# Patient Record
Sex: MALE | Race: WHITE | NOT HISPANIC OR LATINO | URBAN - METROPOLITAN AREA
[De-identification: names, ages, dates, MRNs, and addresses within clinical notes are randomized per-mention and may not be internally consistent; named-entity substitution may affect disease eponyms.]

---

## 2019-11-04 ENCOUNTER — ON CAMPUS - OUTPATIENT (AMBULATORY)
Dept: URBAN - METROPOLITAN AREA HOSPITAL 2 | Facility: HOSPITAL | Age: 74
End: 2019-11-04

## 2019-11-04 ENCOUNTER — OFFICE (AMBULATORY)
Dept: URBAN - METROPOLITAN AREA PATHOLOGY 4 | Facility: PATHOLOGY | Age: 74
End: 2019-11-04

## 2019-11-04 VITALS
SYSTOLIC BLOOD PRESSURE: 116 MMHG | DIASTOLIC BLOOD PRESSURE: 65 MMHG | SYSTOLIC BLOOD PRESSURE: 111 MMHG | TEMPERATURE: 97.9 F | HEART RATE: 84 BPM | HEART RATE: 75 BPM | OXYGEN SATURATION: 93 % | RESPIRATION RATE: 18 BRPM | WEIGHT: 170.4 LBS | HEART RATE: 82 BPM | HEIGHT: 68 IN | DIASTOLIC BLOOD PRESSURE: 70 MMHG | SYSTOLIC BLOOD PRESSURE: 143 MMHG | OXYGEN SATURATION: 100 % | HEART RATE: 79 BPM | SYSTOLIC BLOOD PRESSURE: 147 MMHG | SYSTOLIC BLOOD PRESSURE: 132 MMHG | SYSTOLIC BLOOD PRESSURE: 121 MMHG | DIASTOLIC BLOOD PRESSURE: 66 MMHG | RESPIRATION RATE: 16 BRPM | OXYGEN SATURATION: 95 % | DIASTOLIC BLOOD PRESSURE: 71 MMHG | DIASTOLIC BLOOD PRESSURE: 61 MMHG | OXYGEN SATURATION: 97 %

## 2019-11-04 DIAGNOSIS — K22.70 BARRETT'S ESOPHAGUS WITHOUT DYSPLASIA: ICD-10-CM

## 2019-11-04 DIAGNOSIS — K31.89 OTHER DISEASES OF STOMACH AND DUODENUM: ICD-10-CM

## 2019-11-04 DIAGNOSIS — K44.9 DIAPHRAGMATIC HERNIA WITHOUT OBSTRUCTION OR GANGRENE: ICD-10-CM

## 2019-11-04 LAB
GI HISTOLOGY: A. UNSPECIFIED: (no result)
GI HISTOLOGY: B. UNSPECIFIED: (no result)
GI HISTOLOGY: PDF REPORT: (no result)

## 2019-11-04 PROCEDURE — 88305 TISSUE EXAM BY PATHOLOGIST: CPT | Performed by: INTERNAL MEDICINE

## 2019-11-04 PROCEDURE — 43239 EGD BIOPSY SINGLE/MULTIPLE: CPT | Performed by: INTERNAL MEDICINE

## 2019-11-04 RX ORDER — PANTOPRAZOLE SODIUM 40 MG/1
40 TABLET, DELAYED RELEASE ORAL
Qty: 90 | Refills: 3 | Status: ACTIVE

## 2019-11-04 NOTE — SERVICEHPINOTES
MARGIE MENDEZ  is a  74  male   who presents today for a  EGD   for   the indications listed below. The updated Patient Profile was reviewed prior to the procedure, in conjunction with the Physical Exam, including medical conditions, surgical procedures, medications, allergies, family history and social history. See Physical Exam time stamp below for date and time of HPI completion.Pre-operatively, I reviewed the indication(s) for the procedure, the risks of the procedure [including but not limited to: unexpected bleeding possibly requiring hospitalization and/or unplanned repeat procedures, perforation possibly requiring surgical treatment, missed lesions and complications of sedation/MAC (also explained by anesthesia staff)]. I have evaluated the patient for risks associated with the planned anesthesia and the procedure to be performed and find the patient an acceptable candidate for IV sedation.Multiple opportunities were provided for any questions or concerns, and all questions were answered satisfactorily before any anesthesia was administered. We will proceed with the planned procedure.BR

## 2019-11-27 ENCOUNTER — OFFICE (AMBULATORY)
Dept: URBAN - METROPOLITAN AREA PATHOLOGY 4 | Facility: PATHOLOGY | Age: 74
End: 2019-11-27

## 2019-11-27 ENCOUNTER — ON CAMPUS - OUTPATIENT (AMBULATORY)
Dept: URBAN - METROPOLITAN AREA HOSPITAL 2 | Facility: HOSPITAL | Age: 74
End: 2019-11-27
Payer: COMMERCIAL

## 2019-11-27 VITALS
OXYGEN SATURATION: 99 % | DIASTOLIC BLOOD PRESSURE: 66 MMHG | HEART RATE: 66 BPM | DIASTOLIC BLOOD PRESSURE: 63 MMHG | WEIGHT: 166 LBS | TEMPERATURE: 97 F | SYSTOLIC BLOOD PRESSURE: 98 MMHG | HEART RATE: 67 BPM | SYSTOLIC BLOOD PRESSURE: 137 MMHG | HEART RATE: 69 BPM | HEIGHT: 68 IN | OXYGEN SATURATION: 94 % | DIASTOLIC BLOOD PRESSURE: 55 MMHG | RESPIRATION RATE: 18 BRPM | DIASTOLIC BLOOD PRESSURE: 56 MMHG | DIASTOLIC BLOOD PRESSURE: 48 MMHG | OXYGEN SATURATION: 95 % | HEART RATE: 65 BPM | SYSTOLIC BLOOD PRESSURE: 91 MMHG | RESPIRATION RATE: 16 BRPM | SYSTOLIC BLOOD PRESSURE: 132 MMHG | DIASTOLIC BLOOD PRESSURE: 44 MMHG | SYSTOLIC BLOOD PRESSURE: 109 MMHG | SYSTOLIC BLOOD PRESSURE: 144 MMHG | HEART RATE: 78 BPM | HEART RATE: 73 BPM | OXYGEN SATURATION: 97 % | DIASTOLIC BLOOD PRESSURE: 47 MMHG | OXYGEN SATURATION: 100 % | HEART RATE: 72 BPM | SYSTOLIC BLOOD PRESSURE: 95 MMHG

## 2019-11-27 DIAGNOSIS — K57.30 DIVERTICULOSIS OF LARGE INTESTINE WITHOUT PERFORATION OR ABS: ICD-10-CM

## 2019-11-27 DIAGNOSIS — D50.0 IRON DEFICIENCY ANEMIA SECONDARY TO BLOOD LOSS (CHRONIC): ICD-10-CM

## 2019-11-27 DIAGNOSIS — D12.3 BENIGN NEOPLASM OF TRANSVERSE COLON: ICD-10-CM

## 2019-11-27 DIAGNOSIS — K64.0 FIRST DEGREE HEMORRHOIDS: ICD-10-CM

## 2019-11-27 LAB
GI HISTOLOGY: A. UNSPECIFIED: (no result)
GI HISTOLOGY: PDF REPORT: (no result)

## 2019-11-27 PROCEDURE — 45378 DIAGNOSTIC COLONOSCOPY: CPT | Performed by: INTERNAL MEDICINE

## 2019-11-27 PROCEDURE — 88305 TISSUE EXAM BY PATHOLOGIST: CPT | Performed by: INTERNAL MEDICINE

## 2019-11-27 NOTE — SERVICENOTES
No obvious source of bleeding noted, if repeat Hb is low, will perform small bowel eval with PillCam

## 2019-11-27 NOTE — SERVICEHPINOTES
MARGIE MENDEZ  is a  74  male   who presents today for a  Colonoscopy   for   the indications listed below. The updated Patient Profile was reviewed prior to the procedure, in conjunction with the Physical Exam, including medical conditions, surgical procedures, medications, allergies, family history and social history. See Physical Exam time stamp below for date and time of HPI completion.Pre-operatively, I reviewed the indication(s) for the procedure, the risks of the procedure [including but not limited to: unexpected bleeding possibly requiring hospitalization and/or unplanned repeat procedures, perforation possibly requiring surgical treatment, missed lesions and complications of sedation/MAC (also explained by anesthesia staff)]. I have evaluated the patient for risks associated with the planned anesthesia and the procedure to be performed and find the patient an acceptable candidate for IV sedation.Multiple opportunities were provided for any questions or concerns, and all questions were answered satisfactorily before any anesthesia was administered. We will proceed with the planned procedure.BR

## 2019-12-02 ENCOUNTER — APPOINTMENT (OUTPATIENT)
Dept: GENERAL RADIOLOGY | Facility: HOSPITAL | Age: 74
End: 2019-12-02

## 2019-12-02 ENCOUNTER — HOSPITAL ENCOUNTER (OUTPATIENT)
Facility: HOSPITAL | Age: 74
Setting detail: OBSERVATION
Discharge: HOME | End: 2019-12-03
Attending: EMERGENCY MEDICINE | Admitting: INTERNAL MEDICINE

## 2019-12-02 DIAGNOSIS — D64.9 ANEMIA, UNSPECIFIED TYPE: Primary | ICD-10-CM

## 2019-12-02 PROBLEM — K21.9 CHRONIC GERD: Status: ACTIVE | Noted: 2019-12-02

## 2019-12-02 PROBLEM — E78.5 HYPERLIPIDEMIA: Status: ACTIVE | Noted: 2019-12-02

## 2019-12-02 PROBLEM — I10 ESSENTIAL HYPERTENSION: Status: ACTIVE | Noted: 2019-12-02

## 2019-12-02 LAB
ABO GROUP BLD: NORMAL
ALBUMIN SERPL-MCNC: 5.1 G/DL (ref 3.5–5.2)
ALBUMIN/GLOB SERPL: 1.8 G/DL
ALP SERPL-CCNC: 43 U/L (ref 39–117)
ALT SERPL W P-5'-P-CCNC: 8 U/L (ref 1–41)
ANION GAP SERPL CALCULATED.3IONS-SCNC: 14 MMOL/L (ref 5–15)
ANISOCYTOSIS BLD QL: NORMAL
AST SERPL-CCNC: 17 U/L (ref 1–40)
BASOPHILS # BLD AUTO: 0.1 10*3/MM3 (ref 0–0.2)
BASOPHILS NFR BLD AUTO: 1.7 % (ref 0–1.5)
BILIRUB SERPL-MCNC: 0.2 MG/DL (ref 0.2–1.2)
BLD GP AB SCN SERPL QL: NEGATIVE
BUN BLD-MCNC: 9 MG/DL (ref 8–23)
BUN/CREAT SERPL: 7.7 (ref 7–25)
CALCIUM SPEC-SCNC: 9.6 MG/DL (ref 8.6–10.5)
CHLORIDE SERPL-SCNC: 103 MMOL/L (ref 98–107)
CO2 SERPL-SCNC: 22 MMOL/L (ref 22–29)
CREAT BLD-MCNC: 1.17 MG/DL (ref 0.76–1.27)
DEPRECATED RDW RBC AUTO: 45.9 FL (ref 37–54)
EOSINOPHIL # BLD AUTO: 0.2 10*3/MM3 (ref 0–0.4)
EOSINOPHIL NFR BLD AUTO: 4.9 % (ref 0.3–6.2)
ERYTHROCYTE [DISTWIDTH] IN BLOOD BY AUTOMATED COUNT: 19.9 % (ref 12.3–15.4)
FERRITIN SERPL-MCNC: 9.1 NG/ML (ref 30–400)
GFR SERPL CREATININE-BSD FRML MDRD: 61 ML/MIN/1.73
GLOBULIN UR ELPH-MCNC: 2.8 GM/DL
GLUCOSE BLD-MCNC: 96 MG/DL (ref 65–99)
HCT VFR BLD AUTO: 23.5 % (ref 37.5–51)
HGB BLD-MCNC: 7.3 G/DL (ref 13–17.7)
HOLD SPECIMEN: NORMAL
HOLD SPECIMEN: NORMAL
HYPOCHROMIA BLD QL: NORMAL
IRON 24H UR-MRATE: 12 MCG/DL (ref 59–158)
IRON SATN MFR SERPL: 2 % (ref 20–50)
LDH SERPL-CCNC: 246 U/L (ref 135–225)
LYMPHOCYTES # BLD AUTO: 1.4 10*3/MM3 (ref 0.7–3.1)
LYMPHOCYTES NFR BLD AUTO: 29.5 % (ref 19.6–45.3)
MCH RBC QN AUTO: 20.3 PG (ref 26.6–33)
MCHC RBC AUTO-ENTMCNC: 31 G/DL (ref 31.5–35.7)
MCV RBC AUTO: 65.4 FL (ref 79–97)
MONOCYTES # BLD AUTO: 0.5 10*3/MM3 (ref 0.1–0.9)
MONOCYTES NFR BLD AUTO: 10.1 % (ref 5–12)
NEUTROPHILS # BLD AUTO: 2.5 10*3/MM3 (ref 1.7–7)
NEUTROPHILS NFR BLD AUTO: 53.8 % (ref 42.7–76)
NRBC BLD AUTO-RTO: 0.1 /100 WBC (ref 0–0.2)
PLAT MORPH BLD: NORMAL
PLATELET # BLD AUTO: 292 10*3/MM3 (ref 140–450)
PMV BLD AUTO: 6.7 FL (ref 6–12)
POIKILOCYTOSIS BLD QL SMEAR: NORMAL
POTASSIUM BLD-SCNC: 4.3 MMOL/L (ref 3.5–5.2)
PROT SERPL-MCNC: 7.9 G/DL (ref 6–8.5)
RBC # BLD AUTO: 3.59 10*6/MM3 (ref 4.14–5.8)
RETICS # AUTO: 0.08 10*6/MM3 (ref 0.02–0.13)
RETICS/RBC NFR AUTO: 2.32 % (ref 0.7–1.9)
RH BLD: NEGATIVE
SODIUM BLD-SCNC: 139 MMOL/L (ref 136–145)
T&S EXPIRATION DATE: NORMAL
TIBC SERPL-MCNC: 542 MCG/DL (ref 298–536)
TRANSFERRIN SERPL-MCNC: 364 MG/DL (ref 200–360)
WBC MORPH BLD: NORMAL
WBC NRBC COR # BLD: 4.7 10*3/MM3 (ref 3.4–10.8)
WHOLE BLOOD HOLD SPECIMEN: NORMAL

## 2019-12-02 PROCEDURE — G0378 HOSPITAL OBSERVATION PER HR: HCPCS

## 2019-12-02 PROCEDURE — 83540 ASSAY OF IRON: CPT | Performed by: NURSE PRACTITIONER

## 2019-12-02 PROCEDURE — 83010 ASSAY OF HAPTOGLOBIN QUANT: CPT | Performed by: NURSE PRACTITIONER

## 2019-12-02 PROCEDURE — 86900 BLOOD TYPING SEROLOGIC ABO: CPT | Performed by: EMERGENCY MEDICINE

## 2019-12-02 PROCEDURE — 86901 BLOOD TYPING SEROLOGIC RH(D): CPT | Performed by: EMERGENCY MEDICINE

## 2019-12-02 PROCEDURE — 86923 COMPATIBILITY TEST ELECTRIC: CPT

## 2019-12-02 PROCEDURE — 84466 ASSAY OF TRANSFERRIN: CPT | Performed by: NURSE PRACTITIONER

## 2019-12-02 PROCEDURE — 86901 BLOOD TYPING SEROLOGIC RH(D): CPT

## 2019-12-02 PROCEDURE — P9016 RBC LEUKOCYTES REDUCED: HCPCS

## 2019-12-02 PROCEDURE — 86900 BLOOD TYPING SEROLOGIC ABO: CPT

## 2019-12-02 PROCEDURE — 82728 ASSAY OF FERRITIN: CPT | Performed by: NURSE PRACTITIONER

## 2019-12-02 PROCEDURE — 36430 TRANSFUSION BLD/BLD COMPNT: CPT

## 2019-12-02 PROCEDURE — 99285 EMERGENCY DEPT VISIT HI MDM: CPT

## 2019-12-02 PROCEDURE — 71045 X-RAY EXAM CHEST 1 VIEW: CPT

## 2019-12-02 PROCEDURE — 83615 LACTATE (LD) (LDH) ENZYME: CPT | Performed by: NURSE PRACTITIONER

## 2019-12-02 PROCEDURE — 85025 COMPLETE CBC W/AUTO DIFF WBC: CPT | Performed by: EMERGENCY MEDICINE

## 2019-12-02 PROCEDURE — 85045 AUTOMATED RETICULOCYTE COUNT: CPT | Performed by: NURSE PRACTITIONER

## 2019-12-02 PROCEDURE — 99219 PR INITIAL OBSERVATION CARE/DAY 50 MINUTES: CPT | Performed by: NURSE PRACTITIONER

## 2019-12-02 PROCEDURE — 63710000001 DIPHENHYDRAMINE PER 50 MG: Performed by: EMERGENCY MEDICINE

## 2019-12-02 PROCEDURE — 93005 ELECTROCARDIOGRAM TRACING: CPT | Performed by: EMERGENCY MEDICINE

## 2019-12-02 PROCEDURE — 86850 RBC ANTIBODY SCREEN: CPT | Performed by: EMERGENCY MEDICINE

## 2019-12-02 PROCEDURE — 82607 VITAMIN B-12: CPT | Performed by: NURSE PRACTITIONER

## 2019-12-02 PROCEDURE — 85007 BL SMEAR W/DIFF WBC COUNT: CPT | Performed by: EMERGENCY MEDICINE

## 2019-12-02 PROCEDURE — 82746 ASSAY OF FOLIC ACID SERUM: CPT | Performed by: NURSE PRACTITIONER

## 2019-12-02 PROCEDURE — 80053 COMPREHEN METABOLIC PANEL: CPT | Performed by: EMERGENCY MEDICINE

## 2019-12-02 RX ORDER — ACETAMINOPHEN 650 MG/1
650 SUPPOSITORY RECTAL EVERY 4 HOURS PRN
Status: DISCONTINUED | OUTPATIENT
Start: 2019-12-02 | End: 2019-12-03 | Stop reason: HOSPADM

## 2019-12-02 RX ORDER — CHOLECALCIFEROL (VITAMIN D3) 125 MCG
5 CAPSULE ORAL NIGHTLY PRN
Status: DISCONTINUED | OUTPATIENT
Start: 2019-12-02 | End: 2019-12-03 | Stop reason: HOSPADM

## 2019-12-02 RX ORDER — ASPIRIN 325 MG
325 TABLET, DELAYED RELEASE (ENTERIC COATED) ORAL DAILY
COMMUNITY
End: 2019-12-03 | Stop reason: HOSPADM

## 2019-12-02 RX ORDER — SODIUM CHLORIDE 0.9 % (FLUSH) 0.9 %
10 SYRINGE (ML) INJECTION AS NEEDED
Status: DISCONTINUED | OUTPATIENT
Start: 2019-12-02 | End: 2019-12-03 | Stop reason: HOSPADM

## 2019-12-02 RX ORDER — LISINOPRIL 5 MG/1
10 TABLET ORAL DAILY
Status: DISCONTINUED | OUTPATIENT
Start: 2019-12-03 | End: 2019-12-03 | Stop reason: HOSPADM

## 2019-12-02 RX ORDER — ACETAMINOPHEN 325 MG/1
650 TABLET ORAL EVERY 4 HOURS PRN
Status: DISCONTINUED | OUTPATIENT
Start: 2019-12-02 | End: 2019-12-03 | Stop reason: HOSPADM

## 2019-12-02 RX ORDER — ONDANSETRON 4 MG/1
4 TABLET, FILM COATED ORAL EVERY 6 HOURS PRN
Status: DISCONTINUED | OUTPATIENT
Start: 2019-12-02 | End: 2019-12-03 | Stop reason: HOSPADM

## 2019-12-02 RX ORDER — NITROGLYCERIN 0.4 MG/1
0.4 TABLET SUBLINGUAL
Status: DISCONTINUED | OUTPATIENT
Start: 2019-12-02 | End: 2019-12-03 | Stop reason: HOSPADM

## 2019-12-02 RX ORDER — LISINOPRIL 10 MG/1
10 TABLET ORAL DAILY
COMMUNITY
End: 2021-04-19 | Stop reason: SDUPTHER

## 2019-12-02 RX ORDER — TERAZOSIN 5 MG/1
5 CAPSULE ORAL NIGHTLY
COMMUNITY
End: 2022-11-08 | Stop reason: SDUPTHER

## 2019-12-02 RX ORDER — SODIUM CHLORIDE 0.9 % (FLUSH) 0.9 %
10 SYRINGE (ML) INJECTION EVERY 12 HOURS SCHEDULED
Status: DISCONTINUED | OUTPATIENT
Start: 2019-12-02 | End: 2019-12-03 | Stop reason: HOSPADM

## 2019-12-02 RX ORDER — ONDANSETRON 2 MG/ML
4 INJECTION INTRAMUSCULAR; INTRAVENOUS EVERY 6 HOURS PRN
Status: DISCONTINUED | OUTPATIENT
Start: 2019-12-02 | End: 2019-12-03 | Stop reason: HOSPADM

## 2019-12-02 RX ORDER — PANTOPRAZOLE SODIUM 40 MG/1
40 TABLET, DELAYED RELEASE ORAL EVERY MORNING
Status: DISCONTINUED | OUTPATIENT
Start: 2019-12-03 | End: 2019-12-03 | Stop reason: HOSPADM

## 2019-12-02 RX ORDER — PANTOPRAZOLE SODIUM 40 MG/1
40 TABLET, DELAYED RELEASE ORAL DAILY
COMMUNITY
End: 2022-11-08 | Stop reason: SDUPTHER

## 2019-12-02 RX ORDER — ACETAMINOPHEN 325 MG/1
650 TABLET ORAL ONCE
Status: COMPLETED | OUTPATIENT
Start: 2019-12-02 | End: 2019-12-02

## 2019-12-02 RX ORDER — ATORVASTATIN CALCIUM 20 MG/1
20 TABLET, FILM COATED ORAL DAILY
Status: DISCONTINUED | OUTPATIENT
Start: 2019-12-03 | End: 2019-12-03 | Stop reason: HOSPADM

## 2019-12-02 RX ORDER — DIPHENHYDRAMINE HCL 25 MG
25 TABLET ORAL ONCE
Status: COMPLETED | OUTPATIENT
Start: 2019-12-02 | End: 2019-12-02

## 2019-12-02 RX ORDER — TERAZOSIN 5 MG/1
5 CAPSULE ORAL NIGHTLY
Status: DISCONTINUED | OUTPATIENT
Start: 2019-12-02 | End: 2019-12-03 | Stop reason: HOSPADM

## 2019-12-02 RX ORDER — SIMVASTATIN 40 MG
40 TABLET ORAL NIGHTLY
COMMUNITY
End: 2022-11-08 | Stop reason: SDUPTHER

## 2019-12-02 RX ORDER — ACETAMINOPHEN 650 MG/1
650 SUPPOSITORY RECTAL ONCE
Status: COMPLETED | OUTPATIENT
Start: 2019-12-02 | End: 2019-12-02

## 2019-12-02 RX ORDER — ACETAMINOPHEN 160 MG/5ML
650 SOLUTION ORAL EVERY 4 HOURS PRN
Status: DISCONTINUED | OUTPATIENT
Start: 2019-12-02 | End: 2019-12-03 | Stop reason: HOSPADM

## 2019-12-02 RX ADMIN — ACETAMINOPHEN 650 MG: 325 TABLET ORAL at 21:36

## 2019-12-02 RX ADMIN — DIPHENHYDRAMINE HCL 25 MG: 25 TABLET ORAL at 21:36

## 2019-12-03 VITALS
DIASTOLIC BLOOD PRESSURE: 55 MMHG | OXYGEN SATURATION: 93 % | HEART RATE: 60 BPM | HEIGHT: 68 IN | RESPIRATION RATE: 18 BRPM | SYSTOLIC BLOOD PRESSURE: 126 MMHG | WEIGHT: 166.6 LBS | BODY MASS INDEX: 25.25 KG/M2 | TEMPERATURE: 98.7 F

## 2019-12-03 PROBLEM — D62 ACUTE ON CHRONIC BLOOD LOSS ANEMIA: Status: ACTIVE | Noted: 2019-12-02

## 2019-12-03 PROBLEM — I63.9 CVA (CEREBRAL VASCULAR ACCIDENT) (HCC): Chronic | Status: ACTIVE | Noted: 2019-12-03

## 2019-12-03 LAB
ANION GAP SERPL CALCULATED.3IONS-SCNC: 12 MMOL/L (ref 5–15)
BASOPHILS # BLD AUTO: 0.1 10*3/MM3 (ref 0–0.2)
BASOPHILS NFR BLD AUTO: 1.7 % (ref 0–1.5)
BUN BLD-MCNC: 8 MG/DL (ref 8–23)
BUN/CREAT SERPL: 7.2 (ref 7–25)
CALCIUM SPEC-SCNC: 8.8 MG/DL (ref 8.6–10.5)
CHLORIDE SERPL-SCNC: 108 MMOL/L (ref 98–107)
CO2 SERPL-SCNC: 21 MMOL/L (ref 22–29)
CREAT BLD-MCNC: 1.11 MG/DL (ref 0.76–1.27)
DEPRECATED RDW RBC AUTO: 52.1 FL (ref 37–54)
EOSINOPHIL # BLD AUTO: 0.2 10*3/MM3 (ref 0–0.4)
EOSINOPHIL NFR BLD AUTO: 3.7 % (ref 0.3–6.2)
ERYTHROCYTE [DISTWIDTH] IN BLOOD BY AUTOMATED COUNT: 21.3 % (ref 12.3–15.4)
FOLATE SERPL-MCNC: 18.4 NG/ML (ref 4.78–24.2)
GFR SERPL CREATININE-BSD FRML MDRD: 65 ML/MIN/1.73
GLUCOSE BLD-MCNC: 100 MG/DL (ref 65–99)
HAPTOGLOB SERPL-MCNC: 158 MG/DL (ref 30–200)
HCT VFR BLD AUTO: 23.6 % (ref 37.5–51)
HCT VFR BLD AUTO: 24.3 % (ref 37.5–51)
HCT VFR BLD AUTO: 27 % (ref 37.5–51)
HGB BLD-MCNC: 7.4 G/DL (ref 13–17.7)
HGB BLD-MCNC: 7.5 G/DL (ref 13–17.7)
HGB BLD-MCNC: 8.7 G/DL (ref 13–17.7)
LYMPHOCYTES # BLD AUTO: 1.4 10*3/MM3 (ref 0.7–3.1)
LYMPHOCYTES NFR BLD AUTO: 28.7 % (ref 19.6–45.3)
MCH RBC QN AUTO: 21.3 PG (ref 26.6–33)
MCHC RBC AUTO-ENTMCNC: 30.8 G/DL (ref 31.5–35.7)
MCV RBC AUTO: 69.2 FL (ref 79–97)
MONOCYTES # BLD AUTO: 0.6 10*3/MM3 (ref 0.1–0.9)
MONOCYTES NFR BLD AUTO: 12.3 % (ref 5–12)
NEUTROPHILS # BLD AUTO: 2.7 10*3/MM3 (ref 1.7–7)
NEUTROPHILS NFR BLD AUTO: 53.6 % (ref 42.7–76)
NRBC BLD AUTO-RTO: 0 /100 WBC (ref 0–0.2)
PLATELET # BLD AUTO: 256 10*3/MM3 (ref 140–450)
PMV BLD AUTO: 7.3 FL (ref 6–12)
POTASSIUM BLD-SCNC: 4.2 MMOL/L (ref 3.5–5.2)
RBC # BLD AUTO: 3.51 10*6/MM3 (ref 4.14–5.8)
SODIUM BLD-SCNC: 141 MMOL/L (ref 136–145)
VIT B12 BLD-MCNC: 612 PG/ML (ref 211–946)
WBC NRBC COR # BLD: 5 10*3/MM3 (ref 3.4–10.8)

## 2019-12-03 PROCEDURE — 99220 PR INITIAL OBSERVATION CARE/DAY 70 MINUTES: CPT | Performed by: NURSE PRACTITIONER

## 2019-12-03 PROCEDURE — G0378 HOSPITAL OBSERVATION PER HR: HCPCS

## 2019-12-03 PROCEDURE — 86900 BLOOD TYPING SEROLOGIC ABO: CPT

## 2019-12-03 PROCEDURE — 25010000002 IRON SUCROSE PER 1 MG: Performed by: NURSE PRACTITIONER

## 2019-12-03 PROCEDURE — 85025 COMPLETE CBC W/AUTO DIFF WBC: CPT | Performed by: NURSE PRACTITIONER

## 2019-12-03 PROCEDURE — 85018 HEMOGLOBIN: CPT | Performed by: INTERNAL MEDICINE

## 2019-12-03 PROCEDURE — 85014 HEMATOCRIT: CPT | Performed by: INTERNAL MEDICINE

## 2019-12-03 PROCEDURE — P9016 RBC LEUKOCYTES REDUCED: HCPCS

## 2019-12-03 PROCEDURE — 80048 BASIC METABOLIC PNL TOTAL CA: CPT | Performed by: NURSE PRACTITIONER

## 2019-12-03 PROCEDURE — 36430 TRANSFUSION BLD/BLD COMPNT: CPT

## 2019-12-03 PROCEDURE — 63710000001 DIPHENHYDRAMINE PER 50 MG: Performed by: NURSE PRACTITIONER

## 2019-12-03 RX ORDER — ACETAMINOPHEN 325 MG/1
650 TABLET ORAL ONCE
Status: COMPLETED | OUTPATIENT
Start: 2019-12-03 | End: 2019-12-03

## 2019-12-03 RX ORDER — DIPHENHYDRAMINE HCL 25 MG
25 TABLET ORAL ONCE
Status: COMPLETED | OUTPATIENT
Start: 2019-12-03 | End: 2019-12-03

## 2019-12-03 RX ORDER — FERROUS SULFATE TAB EC 324 MG (65 MG FE EQUIVALENT) 324 (65 FE) MG
324 TABLET DELAYED RESPONSE ORAL 2 TIMES DAILY WITH MEALS
Qty: 60 TABLET | Refills: 0 | Status: SHIPPED | OUTPATIENT
Start: 2019-12-03 | End: 2021-04-19

## 2019-12-03 RX ORDER — FERROUS SULFATE TAB EC 324 MG (65 MG FE EQUIVALENT) 324 (65 FE) MG
324 TABLET DELAYED RESPONSE ORAL 2 TIMES DAILY WITH MEALS
Status: DISCONTINUED | OUTPATIENT
Start: 2019-12-03 | End: 2019-12-03 | Stop reason: HOSPADM

## 2019-12-03 RX ORDER — ACETAMINOPHEN 650 MG/1
650 SUPPOSITORY RECTAL ONCE
Status: COMPLETED | OUTPATIENT
Start: 2019-12-03 | End: 2019-12-03

## 2019-12-03 RX ADMIN — SODIUM CHLORIDE 400 MG: 900 INJECTION, SOLUTION INTRAVENOUS at 12:06

## 2019-12-03 RX ADMIN — ACETAMINOPHEN 650 MG: 325 TABLET, FILM COATED ORAL at 07:04

## 2019-12-03 RX ADMIN — FERROUS SULFATE TAB EC 324 MG (65 MG FE EQUIVALENT) 324 MG: 324 (65 FE) TABLET DELAYED RESPONSE at 08:34

## 2019-12-03 RX ADMIN — PANTOPRAZOLE SODIUM 40 MG: 40 TABLET, DELAYED RELEASE ORAL at 06:27

## 2019-12-03 RX ADMIN — DIPHENHYDRAMINE HCL 25 MG: 25 TABLET ORAL at 07:04

## 2019-12-03 RX ADMIN — LISINOPRIL 10 MG: 5 TABLET ORAL at 08:34

## 2019-12-03 NOTE — PLAN OF CARE
Problem: Patient Care Overview  Goal: Plan of Care Review  Outcome: Ongoing (interventions implemented as appropriate)   12/03/19 0343   OTHER   Outcome Summary pt had no complaints overnight, appears to have rested well. pt received 1 unit of PRBC's, will continue to montior pt      Goal: Individualization and Mutuality  Outcome: Ongoing (interventions implemented as appropriate)    Goal: Discharge Needs Assessment  Outcome: Ongoing (interventions implemented as appropriate)    Goal: Interprofessional Rounds/Family Conf  Outcome: Ongoing (interventions implemented as appropriate)      Problem: Anemia (Adult)  Goal: Identify Related Risk Factors and Signs and Symptoms  Outcome: Outcome(s) achieved Date Met: 12/03/19 12/03/19 0343   Anemia (Adult)   Related Risk Factors (Anemia) other (see comments)  (none)   Signs and Symptoms (Anemia) other (see comments)  (none)     Goal: Symptom Improvement  Outcome: Ongoing (interventions implemented as appropriate)   12/03/19 0343   Anemia (Adult)   Symptom Improvement making progress toward outcome

## 2019-12-03 NOTE — PLAN OF CARE
Problem: Patient Care Overview  Goal: Plan of Care Review  Outcome: Ongoing (interventions implemented as appropriate)   12/03/19 1501   OTHER   Outcome Summary Patient states that he is feeling much better. Patient recieved his second unit of PRBC's this morning and 400 mg Iron Sucrose IV. Hgb has increased from 7.5 to 8.7. Will continue to monitor.    Coping/Psychosocial   Plan of Care Reviewed With patient   Plan of Care Review   Progress improving     Goal: Individualization and Mutuality  Outcome: Ongoing (interventions implemented as appropriate)    Goal: Discharge Needs Assessment  Outcome: Ongoing (interventions implemented as appropriate)    Goal: Interprofessional Rounds/Family Conf  Outcome: Ongoing (interventions implemented as appropriate)      Problem: Anemia (Adult)  Goal: Symptom Improvement  Outcome: Ongoing (interventions implemented as appropriate)

## 2019-12-03 NOTE — CONSULTS
Hematology/Oncology Inpatient Consultation    Patient name: Chong Manuel  : 1945  MRN: [id number]  Referring Provider: RADHA Chacko  Reason for Consultation: Anemia    Chief complaint: Anemia    History of present illness:    74 y.o. male admitted through the ED 2019 with anemia with patient reporting outpatient hemoglobin 6.7 on 2019.  Provider notes state patient was recently diagnosed with anemia and had been evaluated by GI with negative endoscopies aside from Bailey's esophagus and some diverticulosis.  On admission 2019 CBC revealed WBC 4.7, hemoglobin 7.3, MCV 65.4, and platelets 292,000.  Anemia labs revealed iron 12 (), iron saturation 2% (20-50) sees, TIBC 542 (298-536), ferritin 9.1 ().  Reticulocytes were 2.32 (0.7-1.9),  (135-225).  Remaining work-up was pending at time of consult.  He was transfused 1 unit pRBC.  Chest x-ray was negative for acute findings.    19  Hematology/Oncology was consulted for anemia.    PCP: Garcia Johnson MD    History:  Past Medical History:   Diagnosis Date   • Anemia    • GERD (gastroesophageal reflux disease)    • Hypertension    ,   Past Surgical History:   Procedure Laterality Date   • CARDIAC SURGERY     • COLONOSCOPY     , History reviewed. No pertinent family history.,   Social History     Tobacco Use   • Smoking status: Former Smoker   • Smokeless tobacco: Never Used   Substance Use Topics   • Alcohol use: No     Frequency: Never   • Drug use: No   ,   Medications Prior to Admission   Medication Sig Dispense Refill Last Dose   • aspirin  MG tablet Take 325 mg by mouth Daily.   2019 at Unknown time   • lisinopril (PRINIVIL,ZESTRIL) 10 MG tablet Take 10 mg by mouth Daily.   2019 at Unknown time   • pantoprazole (PROTONIX) 40 MG EC tablet Take 40 mg by mouth Daily.   2019 at Unknown time   • simvastatin (ZOCOR) 40 MG tablet Take 40 mg by mouth Every Night.   2019 at Unknown  "time   • terazosin (HYTRIN) 5 MG capsule Take 5 mg by mouth Every Night.   12/1/2019 at Unknown time   , Scheduled Meds:      atorvastatin 20 mg Oral Daily   ferrous sulfate 324 mg Oral BID With Meals   iron sucrose 300 mg Intravenous Once   lisinopril 10 mg Oral Daily   pantoprazole 40 mg Oral QAM   sodium chloride 10 mL Intravenous Q12H   terazosin 5 mg Oral Nightly   , Continuous Infusions:   , PRN Meds:  •  acetaminophen **OR** acetaminophen **OR** acetaminophen  •  melatonin  •  nitroglycerin  •  ondansetron **OR** ondansetron  •  [COMPLETED] Insert peripheral IV **AND** sodium chloride  •  sodium chloride   Allergies:  Sulfa antibiotics    ROS:  Review of Systems     Objective     Vital Signs:   /56   Pulse 59   Temp 97.8 °F (36.6 °C) (Oral)   Resp 16   Ht 172.7 cm (68\")   Wt 75.6 kg (166 lb 9.6 oz)   SpO2 93%   BMI 25.33 kg/m²     Physical Exam:  Physical Exam     Results Review:  Lab Results (last 48 hours)     Procedure Component Value Units Date/Time    CBC & Differential [[id number]] Collected:  12/03/19 0322    Specimen:  Blood Updated:  12/03/19 0541    Narrative:       The following orders were created for panel order CBC & Differential.  Procedure                               Abnormality         Status                     ---------                               -----------         ------                     CBC Auto Differential[[id number]]        Abnormal            Final result                 Please view results for these tests on the individual orders.    CBC Auto Differential [[id number]]  (Abnormal) Collected:  12/03/19 0322    Specimen:  Blood Updated:  12/03/19 0541     WBC 5.00 10*3/mm3      RBC 3.51 10*6/mm3      Hemoglobin 7.5 g/dL      Hematocrit 24.3 %      MCV 69.2 fL      MCH 21.3 pg      MCHC 30.8 g/dL      RDW 21.3 %      RDW-SD 52.1 fl      MPV 7.3 fL      Platelets 256 10*3/mm3      Neutrophil % 53.6 %      Lymphocyte % 28.7 %      Monocyte % 12.3 %      Eosinophil % 3.7 " %      Basophil % 1.7 %      Neutrophils, Absolute 2.70 10*3/mm3      Lymphocytes, Absolute 1.40 10*3/mm3      Monocytes, Absolute 0.60 10*3/mm3      Eosinophils, Absolute 0.20 10*3/mm3      Basophils, Absolute 0.10 10*3/mm3      nRBC 0.0 /100 WBC     Basic Metabolic Panel [[id number]]  (Abnormal) Collected:  12/03/19 0322    Specimen:  Blood Updated:  12/03/19 0402     Glucose 100 mg/dL      BUN 8 mg/dL      Creatinine 1.11 mg/dL      Sodium 141 mmol/L      Potassium 4.2 mmol/L      Chloride 108 mmol/L      CO2 21.0 mmol/L      Calcium 8.8 mg/dL      eGFR Non African Amer 65 mL/min/1.73      BUN/Creatinine Ratio 7.2     Anion Gap 12.0 mmol/L     Narrative:       GFR Normal >60  Chronic Kidney Disease <60  Kidney Failure <15    Hemoglobin & Hematocrit, Blood [[id number]]  (Abnormal) Collected:  12/03/19 0034    Specimen:  Blood Updated:  12/03/19 0117     Hemoglobin 7.4 g/dL      Hematocrit 23.6 %     Haptoglobin [[id number]] Collected:  12/02/19 2250    Specimen:  Blood Updated:  12/02/19 2302    Reticulocytes [[id number]]  (Abnormal) Collected:  12/02/19 2037    Specimen:  Blood Updated:  12/02/19 2251     Reticulocyte % 2.32 %      Reticulocyte Absolute 0.0845 10*6/mm3     Ferritin [[id number]]  (Abnormal) Collected:  12/02/19 2037    Specimen:  Blood Updated:  12/02/19 2244     Ferritin 9.10 ng/mL     Iron Profile [[id number]]  (Abnormal) Collected:  12/02/19 2037    Specimen:  Blood Updated:  12/02/19 2238     Iron 12 mcg/dL      Iron Saturation 2 %      Transferrin 364 mg/dL      TIBC 542 mcg/dL     Lactate Dehydrogenase [[id number]]  (Abnormal) Collected:  12/02/19 2037    Specimen:  Blood Updated:  12/02/19 2238      U/L     Vitamin B12 [[id number]] Collected:  12/02/19 2037    Specimen:  Blood Updated:  12/02/19 2221    Folate [[id number]] Collected:  12/02/19 2037    Specimen:  Blood Updated:  12/02/19 2221    Extra Tubes [[id number]] Collected:  12/02/19 2037    Specimen:  Blood, Venous Line Updated:   12/02/19 2148    Narrative:       The following orders were created for panel order Extra Tubes.  Procedure                               Abnormality         Status                     ---------                               -----------         ------                     Light Blue Top[[id number]]                                   Final result               Gold Top - SST[[id number]]                                   Final result               Green Top (Gel)[[id number]]                                  Final result                 Please view results for these tests on the individual orders.    Light Blue Top [[id number]] Collected:  12/02/19 2037    Specimen:  Blood Updated:  12/02/19 2148     Extra Tube hold for add-on     Comment: Auto resulted       Gold Top - SST [[id number]] Collected:  12/02/19 2037    Specimen:  Blood Updated:  12/02/19 2148     Extra Tube Hold for add-ons.     Comment: Auto resulted.       Green Top (Gel) [[id number]] Collected:  12/02/19 2037    Specimen:  Blood Updated:  12/02/19 2148     Extra Tube Hold for add-ons.     Comment: Auto resulted.       Scan Slide [[id number]] Collected:  12/02/19 2037    Specimen:  Blood Updated:  12/02/19 2115     Anisocytosis Slight/1+     Hypochromia Slight/1+     Poikilocytes Slight/1+     WBC Morphology Normal     Platelet Morphology Normal    Narrative:       Slide Reviewed    CBC & Differential [[id number]] Collected:  12/02/19 2037    Specimen:  Blood Updated:  12/02/19 2115    Narrative:       The following orders were created for panel order CBC & Differential.  Procedure                               Abnormality         Status                     ---------                               -----------         ------                     CBC Auto Differential[[id number]]        Abnormal            Final result                 Please view results for these tests on the individual orders.    CBC Auto Differential [[id number]]  (Abnormal) Collected:  12/02/19 2037     Specimen:  Blood Updated:  12/02/19 2115     WBC 4.70 10*3/mm3      RBC 3.59 10*6/mm3      Hemoglobin 7.3 g/dL      Hematocrit 23.5 %      MCV 65.4 fL      MCH 20.3 pg      MCHC 31.0 g/dL      RDW 19.9 %      RDW-SD 45.9 fl      MPV 6.7 fL      Platelets 292 10*3/mm3      Neutrophil % 53.8 %      Lymphocyte % 29.5 %      Monocyte % 10.1 %      Eosinophil % 4.9 %      Basophil % 1.7 %      Neutrophils, Absolute 2.50 10*3/mm3      Lymphocytes, Absolute 1.40 10*3/mm3      Monocytes, Absolute 0.50 10*3/mm3      Eosinophils, Absolute 0.20 10*3/mm3      Basophils, Absolute 0.10 10*3/mm3      nRBC 0.1 /100 WBC     Narrative:       Appended report. These results have been appended to a previously verified report.    Comprehensive Metabolic Panel [[id number]] Collected:  12/02/19 2037    Specimen:  Blood Updated:  12/02/19 2108     Glucose 96 mg/dL      BUN 9 mg/dL      Creatinine 1.17 mg/dL      Sodium 139 mmol/L      Potassium 4.3 mmol/L      Chloride 103 mmol/L      CO2 22.0 mmol/L      Calcium 9.6 mg/dL      Total Protein 7.9 g/dL      Albumin 5.10 g/dL      ALT (SGPT) 8 U/L      AST (SGOT) 17 U/L      Alkaline Phosphatase 43 U/L      Total Bilirubin 0.2 mg/dL      eGFR Non African Amer 61 mL/min/1.73      Globulin 2.8 gm/dL      A/G Ratio 1.8 g/dL      BUN/Creatinine Ratio 7.7     Anion Gap 14.0 mmol/L     Narrative:       GFR Normal >60  Chronic Kidney Disease <60  Kidney Failure <15           Pending Results: Vitamin B12, folate, haptoglobin, stool heme    Imaging Reviewed:   Xr Chest 1 View    Result Date: 12/2/2019  Senescent changes of the thoracic aorta and bony thorax. Evidence of prior granulomatous disease. No acute cardiopulmonary process. There are no prior studies for comparison.  Electronically Signed By-Raimundo Friedman DO. On:12/2/2019 9:57 PM This report was finalized on [id number] by  Raimundo Friedman DO..      I have reviewed the patient's labs, imaging, reports, and other clinician  documentation.         Assessment/Plan   ASSESSMENT  1. Microcytic anemia/LVI- patient reported outpatient endoscopies negative-will obtain copies. On oral iron. Small bowel angiectasia/bleeding in differential.  Retic and LDH slightly elevated with hapto pending-less likely hemolysis.  Vitamin B12 and folate pending.  2. GERD - on PPI.  3. H/o CVA/HLD/HTN -on ASA, lisinopril and Zocor.  Per primary team.    PLAN  1. Venofer 400mg IV x2  2. Stool heme x3  3. Await remaining anemia work-up results  4. Recommend outpatient capsule endoscopy  5. We will obtain copies of recent EGD and colonoscopy    Note prepared by ANTHONY Thompson.  Patient seen and examined by Dr. Jenkins.  Electronically signed by SARAH Ramsey, 12/03/19, 10:12 AM.    I have personally performed a face-to-face diagnostic evaluation on this patient.  I have reviewed and agreed with the care plan.  Hematology consulted for anemia.  Patient is profoundly microcytic.  Clearly iron deficient.  He does have occult bleeding, but source is not clear.  Status post EGD and colonoscopy.  Will require a capsule endoscopy as an outpatient.  We will give IV Venofer.  Not suspecting any hematological malignancy.  Monitor CBC      I discussed the patients findings and my recommendations with patient.    Thank you for this consult.  We will be happy to follow along in the care of this patient.     Jermaine Jenkins MD  12/03/19  9:31 AM

## 2019-12-03 NOTE — H&P
[facility] Medicine Services      Patient Name: Chong Manuel  : 1945  MRN: [id number]  Primary Care Physician: Garcia Johnson MD  Date of admission: 2019    Patient Care Team:  Garcia Johnson MD as PCP - General (Family Medicine)          Subjective   History Present Illness     Chief Complaint:   Chief Complaint   Patient presents with   • Anemia       Mr. Manuel is a 74 y.o.  presents to [facility] complaining of low hemoglobin.         74 year old male presented to ED for low Hgb. He reports he saw his PCP who told him his Hgb was 6.7 . He reports some general weakness but denies dizziness, chest pain or dyspnea. He denies melena, hematochezia, hematemesis or hematuria. He was recently evaluated by GI and had a colonoscopy and EGD for his anemia . No active bleeding found but he was found to have Barrets esophagus severe diverticulosis and and hemorrhoids. He reports he had anemia in  and they found no explanation. He reports he stopped drinking alcohol in  and stopped smoking. He denies any renal disease. Other labs are unremarkable . He will be admitted for 1 unit PRBC with recheck Hgb and hematology consult for persistent anemia . Past medical history of CVA in with mild residual weakness right arm, GERD and HTN.         Review of Systems   Constitution: Positive for weakness.   HENT: Negative.    Eyes: Negative.    Cardiovascular: Negative.    Respiratory: Negative.    Endocrine: Negative.    Hematologic/Lymphatic: Negative.    Skin: Negative.    Musculoskeletal: Negative.    Gastrointestinal: Negative.    Genitourinary: Negative.    Psychiatric/Behavioral: Negative.    Allergic/Immunologic: Negative.            Personal History     Past Medical History:   Past Medical History:   Diagnosis Date   • Anemia    • GERD (gastroesophageal reflux disease)    • Hypertension        Surgical History:      Past Surgical History:   Procedure  Laterality Date   • CARDIAC SURGERY     • COLONOSCOPY             Family History: family history is not on file. Otherwise pertinent FHx was reviewed and unremarkable.     Social History:  reports that he has quit smoking. He has never used smokeless tobacco. He reports that he does not drink alcohol or use drugs.      Medications:  Prior to Admission medications    Not on File       Allergies:    Allergies   Allergen Reactions   • Sulfa Antibiotics Other (See Comments)     Pt unable to specify       Objective   Objective     Vital Signs  Temp:  [97.9 °F (36.6 °C)-98.3 °F (36.8 °C)] 98.1 °F (36.7 °C)  Heart Rate:  [58-91] 58  Resp:  [16-18] 16  BP: (129-148)/(45-66) 134/57  SpO2:  [95 %-98 %] 95 %  on   ;   Device (Oxygen Therapy): room air  Body mass index is 25.3 kg/m².    Physical Exam   Constitutional: He is oriented to person, place, and time. He appears well-developed and well-nourished.   HENT:   Head: Normocephalic and atraumatic.   Eyes: EOM are normal.   Neck: Normal range of motion. Neck supple.   Cardiovascular: Normal rate, regular rhythm and normal heart sounds.   Pulmonary/Chest: Effort normal and breath sounds normal.   Abdominal: Soft. Bowel sounds are normal.   Musculoskeletal: Normal range of motion.   Neurological: He is alert and oriented to person, place, and time.   Skin: Skin is warm and dry.   Psychiatric: He has a normal mood and affect. His behavior is normal. Judgment and thought content normal.       Results Review:  I have personally reviewed most recent lab results and agree with findings, most notably: CBC.    Results from last 7 days   Lab Units 12/02/19 2037   WBC 10*3/mm3 4.70   HEMOGLOBIN g/dL 7.3*   HEMATOCRIT % 23.5*   PLATELETS 10*3/mm3 292     Results from last 7 days   Lab Units 12/02/19 2037   SODIUM mmol/L 139   POTASSIUM mmol/L 4.3   CHLORIDE mmol/L 103   CO2 mmol/L 22.0   BUN mg/dL 9   CREATININE mg/dL 1.17   GLUCOSE mg/dL 96   CALCIUM mg/dL 9.6   ALT (SGPT) U/L 8   AST  (SGOT) U/L 17     Estimated Creatinine Clearance: 58.3 mL/min (by C-G formula based on SCr of 1.17 mg/dL).  Brief Urine Lab Results     None          Microbiology Results (last 10 days)     ** No results found for the last 240 hours. **          ECG/EMG Results (most recent)     Procedure Component Value Units Date/Time    ECG 12 Lead [[id number]] Collected:  12/02/19 2053     Updated:  12/02/19 2054    Narrative:       HEART RATE= 77  bpm  RR Interval= 792  ms  [place] Interval= 162  ms  P Horizontal Axis= 0  deg  P Front Axis= 69  deg  QRSD Interval= 97  ms  QT Interval= 386  ms  QRS Axis= 56  deg  T Wave Axis= 6  deg  - ABNORMAL ECG -  Sinus rhythm  Multiple ventricular premature complexes  Electronically Signed By:   Date and Time of Study: 2019-12-02 20:53:23                    Xr Chest 1 View    Result Date: 12/2/2019  Senescent changes of the thoracic aorta and bony thorax. Evidence of prior granulomatous disease. No acute cardiopulmonary process. There are no prior studies for comparison.  Electronically Signed By-Raimundo Friedman DO. On:12/2/2019 9:57 PM This report was finalized on [id number] by  Raimundo Friedman DO..        Estimated Creatinine Clearance: 58.3 mL/min (by C-G formula based on SCr of 1.17 mg/dL).    Assessment/Plan   Assessment/Plan       Active Hospital Problems:  Anemia- (present on admission)    Hgb 7.3 was reported to be 6.7 on Friday- currently receiving I unit PRBC. Had GI work up which was negative for active bleeding, hold aspirin , Hematology consulted , anemia studies ordered       Chronic GERD    On PPI      Hyperlipidemia    On statin       Essential hypertension    Controlled on home lisinopril and Hytrin                   VTE Prophylaxis - SCDs.    CODE STATUS:    Code Status and Medical Interventions:   Ordered at: 12/02/19 2207     Code Status:    CPR     Medical Interventions (Level of Support Prior to Arrest):    Full       Admission Status:  I believe this patient meets  observation criteria.      I discussed the patients findings and my recommendations with patient.        Electronically signed by RADHA Hitchcock, 12/02/19, 10:07 PM.  [facility] Hospitalist Team

## 2019-12-03 NOTE — HOSPITAL COURSE
[facility]   DISCHARGE SUMMARY    Patient Name: Chong Manuel  : 1945  MRN: [id number]    Date of Admission: 2019  Date of Discharge:  12/3/2019    Primary Care Physician: Garcia Johnson MD    Consults     Date and Time Order Name Status Description    12/3/2019 0030 Inpatient Hematology & Oncology Consult Completed     [id number] Hospitalist (on-call MD unless specified) Completed           Hospital Course     Presenting Problem:   Anemia, unspecified type [D64.9]    Active Hospital Problems:  * Acute on chronic blood loss anemia- (present on admission)    - likely from GI loss given LIV    - GI work up was negative for active bleeding in EGD and colonoscopy. Seen by Dr. Reza who is scheduling for capsule endoscopy  - continue to hold aspirin  ( taking for old CVA )  - Hematology consulted   - Iron study - c/w LIV, iv venofer and po iron supplement applied  - received 2 units PRBC with hb up to 8.7  - clinically denies dizziness, feels good         Chronic GERD    On PPI      Hyperlipidemia    On statin       Essential hypertension    Controlled on home lisinopril and Hytrin           Resolved Hospital Problems:  No notes have been filed under this hospital service.  Service: Hospitalist        Hospital Course:  Chong Manuel is a 74 y.o. male with PMH of GERD, HLD, CVA, HTN who has been anemic lately with neg EGD and colonoscopy. So scheduled for capsule endoscopy by DR. Reza but due to dropping h/h, sent in for further management by PCP. Received 2units PRBC for symptomatic anemia in addition to iv and po iron. He had iv venofer 400mg . Po iron bid was prescribed.  No sign of active bleeding without hematochezia or BRBPR. No obvious sign of hemolysis. Also seen by hematologist but likely needs to rule out GI loss by completing capsule endoscopy.   Iron study is c/w LIV. Normal B12 level. Normal haptoglobin. No obvious sign of hemolysis.  Microcytosis. Slightly elevated  "reticulocyte due to anemia.     Stable for discharge today with normal BP.no leukocytosis. Ambulating in the room without difficulty.          /55 (BP Location: Right arm, Patient Position: Lying)   Pulse 60   Temp 98.7 °F (37.1 °C) (Oral)   Resp 18   Ht 172.7 cm (68\")   Wt 75.6 kg (166 lb 9.6 oz)   SpO2 93%   BMI 25.33 kg/m²     General Appearance:    Alert, cooperative, no distress, appears stated age   Head:    Normocephalic, without obvious abnormality, atraumatic   Eyes:    PERRL, conjunctiva/corneas clear, EOM's intact, fundi     benign, both eyes   Ears:    Normal TM's and external ear canals, both ears   Nose:   Nares normal, septum midline, mucosa normal, no drainage     or sinus tenderness   Throat:   Lips, mucosa, and tongue normal; teeth and gums normal   Neck:   Supple, symmetrical, trachea midline, no adenopathy;     thyroid:  no enlargement/tenderness/nodules; no carotid    bruit or JVD   Back:     Symmetric, no curvature, ROM normal, no CVA tenderness   Lungs:     Clear to auscultation bilaterally, respirations unlabored   Chest Wall:    No tenderness or deformity    Heart:    Regular rate and rhythm, S1 and S2 normal, no murmur, rub    or gallop   Breast Exam:    No tenderness, masses, or nipple abnormality   Abdomen:     Soft, non-tender, bowel sounds active all four quadrants,     no masses, no organomegaly   Genitalia:    Normal female without lesion, discharge or tenderness   Rectal:    Normal tone, no masses or tenderness; guaiac negative stool   Extremities:   Extremities normal, atraumatic, no cyanosis or edema   Pulses:   2+ and symmetric all extremities   Skin:   Skin color, texture, turgor normal, no rashes or lesions   Lymph nodes:   Cervical, supraclavicular, and axillary nodes normal   Neurologic:   CNII-XII intact, normal strength, sensation and reflexes     throughout        Hemodynamically stable for discharge.       Discharge Follow Up Recommendations for " labs/diagnostics:         Day of Discharge     HPI:   74 year old male presented to ED for low Hgb. He reports he saw his PCP who told him his Hgb was 6.7 . He reports some general weakness but denies dizziness, chest pain or dyspnea. He denies melena, hematochezia, hematemesis or hematuria. He was recently evaluated by GI and had a colonoscopy and EGD for his anemia . No active bleeding found but he was found to have Barrets esophagus severe diverticulosis and and hemorrhoids. He reports he had anemia in 2012 and they found no explanation. He reports he stopped drinking alcohol in 1991 and stopped smoking. He denies any renal disease. Other labs are unremarkable . He will be admitted for 1 unit PRBC with recheck Hgb and hematology consult for persistent anemia . Past medical history of CVA in 2011with mild residual weakness right arm, GERD and HTN.       Vital Signs:   Temp:  [97.8 °F (36.6 °C)-98.7 °F (37.1 °C)] 98.7 °F (37.1 °C)  Heart Rate:  [56-91] 60  Resp:  [15-18] 18  BP: (103-148)/(41-66) 126/55     Physical Exam:    Constitutional: He is oriented to person, place, and time. He appears well-developed and well-nourished.   HENT:   Head: Normocephalic and atraumatic.   Eyes: EOM are normal.   Neck: Normal range of motion. Neck supple.   Cardiovascular: Normal rate, regular rhythm and normal heart sounds.   Pulmonary/Chest: Effort normal and breath sounds normal.   Abdominal: Soft. Bowel sounds are normal.   Musculoskeletal: Normal range of motion.   Neurological: He is alert and oriented to person, place, and time.   Skin: Skin is warm and dry.   Psychiatric: He has a normal mood and affect. His behavior is normal. Judgment and thought content normal.       Pertinent  and/or Most Recent Results     Results from last 7 days   Lab Units 12/03/19  1052 12/03/19  0322 12/03/19  0034 12/02/19 2037   WBC 10*3/mm3  --  5.00  --  4.70   HEMOGLOBIN g/dL 8.7* 7.5* 7.4* 7.3*   HEMATOCRIT % 27.0* 24.3* 23.6* 23.5*    PLATELETS 10*3/mm3  --  256  --  292   SODIUM mmol/L  --  141  --  139   POTASSIUM mmol/L  --  4.2  --  4.3   CHLORIDE mmol/L  --  108*  --  103   CO2 mmol/L  --  21.0*  --  22.0   BUN mg/dL  --  8  --  9   CREATININE mg/dL  --  1.11  --  1.17   GLUCOSE mg/dL  --  100*  --  96   CALCIUM mg/dL  --  8.8  --  9.6     Results from last 7 days   Lab Units 12/02/19 2037   BILIRUBIN mg/dL 0.2   ALK PHOS U/L 43   ALT (SGPT) U/L 8   AST (SGOT) U/L 17           Invalid input(s): TG, LDLCALC, LDLREALC        Brief Urine Lab Results     None          Microbiology Results Abnormal     None          Xr Chest 1 View    Result Date: 12/2/2019  Impression: Senescent changes of the thoracic aorta and bony thorax. Evidence of prior granulomatous disease. No acute cardiopulmonary process. There are no prior studies for comparison.  Electronically Signed By-Raimundo Friedman DO. On:12/2/2019 9:57 PM This report was finalized on [id number] by  Raimundo Friedman DO..                Discharge Details        Discharge Medications      New Medications      Instructions Start Date   ferrous sulfate 324 (65 Fe) MG tablet delayed-release EC tablet   324 mg, Oral, 2 Times Daily With Meals         Continue These Medications      Instructions Start Date   lisinopril 10 MG tablet  Commonly known as:  PRINIVIL,ZESTRIL   10 mg, Oral, Daily      pantoprazole 40 MG EC tablet  Commonly known as:  PROTONIX   40 mg, Oral, Daily      simvastatin 40 MG tablet  Commonly known as:  ZOCOR   40 mg, Oral, Nightly      terazosin 5 MG capsule  Commonly known as:  HYTRIN   5 mg, Oral, Nightly         Stop These Medications    aspirin  MG tablet            Allergies   Allergen Reactions   • Sulfa Antibiotics Other (See Comments)     Pt unable to specify         Discharge Disposition:  Home or Self Care    Diet:  Hospital:  Diet Order   Procedures   • Diet Regular         Discharge Activity: as tolerated        CODE STATUS:    Code Status and Medical  Interventions:   Ordered at: 12/02/19 2207     Code Status:    CPR     Medical Interventions (Level of Support Prior to Arrest):    Full         No future appointments.   Time spent on Discharge including face to face service:  45 minutes    Electronically signed by Fidelina Buck MD, 12/03/19, 5:55 PM.

## 2019-12-03 NOTE — ED PROVIDER NOTES
Subjective   Chief complaint low blood count    History of present illness 74-year-old male who has been undergoing evaluation for anemia recently he had a recent CBC which showed a hemoglobin he was sent to GI patient had a recent upper and lower endoscopy which showed no obvious bleeding source.  He was noted to have some Bailey's esophagus and some diverticulosis.  He states that his doctor did a repeat blood count was down to 6.7 and this was done on Friday and he was sent to the hospital because of this.  He denies any complaints of pain no fever chills no weight loss no night sweats.  He denies any black or bloody stool.  No dizziness or syncope just some generalized weakness which is worse with movement better with rest continuous moderate degree is been ongoing for several days.            Review of Systems   Constitutional: Negative for chills and fever.   HENT: Negative for congestion and sinus pressure.    Eyes: Negative for photophobia and visual disturbance.   Respiratory: Negative for chest tightness and shortness of breath.    Cardiovascular: Negative for chest pain and leg swelling.   Gastrointestinal: Negative for abdominal pain, blood in stool and vomiting.   Endocrine: Negative for cold intolerance and heat intolerance.   Genitourinary: Negative for difficulty urinating and dysuria.   Musculoskeletal: Negative for arthralgias and back pain.   Skin: Negative for color change and pallor.   Neurological: Negative for dizziness and light-headedness.   Psychiatric/Behavioral: Negative for agitation and behavioral problems.       No past medical history on file.  Hypertension previous stroke high cholesterol  Allergies   Allergen Reactions   • Sulfa Antibiotics Other (See Comments)     Pt unable to specify       No past surgical history on file.    No family history on file.    Social History     Socioeconomic History   • Marital status: Single     Spouse name: Not on file   • Number of children: Not  on file   • Years of education: Not on file   • Highest education level: Not on file     Prior to Admission medications    Not on File       Medications are unknown and is no list currently available.    Objective   Physical Exam  74-year-old male awake alert pale appearing HEENT extraocular muscles intact sclera clear but pale mouth is clear neck is supple no adenopathy no meningeal signs no JVD lungs clear no retractions heart regular with systolic murmur and was soft without tenderness no masses.  Extremities pulses are equal upper and lower extremities no edema cords or Homans sign ribs DVT.  Patient's awake alert follows commands motor strength normal without focal weakness  Procedures           ED Course      Results for orders placed or performed during the hospital encounter of 12/02/19   Comprehensive Metabolic Panel   Result Value Ref Range    Glucose 96 65 - 99 mg/dL    BUN 9 8 - 23 mg/dL    Creatinine 1.17 0.76 - 1.27 mg/dL    Sodium 139 136 - 145 mmol/L    Potassium 4.3 3.5 - 5.2 mmol/L    Chloride 103 98 - 107 mmol/L    CO2 22.0 22.0 - 29.0 mmol/L    Calcium 9.6 8.6 - 10.5 mg/dL    Total Protein 7.9 6.0 - 8.5 g/dL    Albumin 5.10 3.50 - 5.20 g/dL    ALT (SGPT) 8 1 - 41 U/L    AST (SGOT) 17 1 - 40 U/L    Alkaline Phosphatase 43 39 - 117 U/L    Total Bilirubin 0.2 0.2 - 1.2 mg/dL    eGFR Non African Amer 61 >60 mL/min/1.73    Globulin 2.8 gm/dL    A/G Ratio 1.8 g/dL    BUN/Creatinine Ratio 7.7 7.0 - 25.0    Anion Gap 14.0 5.0 - 15.0 mmol/L   CBC Auto Differential   Result Value Ref Range    WBC 4.70 3.40 - 10.80 10*3/mm3    RBC 3.59 (L) 4.14 - 5.80 10*6/mm3    Hemoglobin 7.3 (L) 13.0 - 17.7 g/dL    Hematocrit 23.5 (L) 37.5 - 51.0 %    MCV 65.4 (L) 79.0 - 97.0 fL    MCH 20.3 (L) 26.6 - 33.0 pg    MCHC 31.0 (L) 31.5 - 35.7 g/dL    RDW 19.9 (H) 12.3 - 15.4 %    RDW-SD 45.9 37.0 - 54.0 fl    MPV 6.7 6.0 - 12.0 fL    Platelets 292 140 - 450 10*3/mm3    Neutrophil % 53.8 42.7 - 76.0 %    Lymphocyte % 29.5 19.6  - 45.3 %    Monocyte % 10.1 5.0 - 12.0 %    Eosinophil % 4.9 0.3 - 6.2 %    Basophil % 1.7 (H) 0.0 - 1.5 %    Neutrophils, Absolute 2.50 1.70 - 7.00 10*3/mm3    Lymphocytes, Absolute 1.40 0.70 - 3.10 10*3/mm3    Monocytes, Absolute 0.50 0.10 - 0.90 10*3/mm3    Eosinophils, Absolute 0.20 0.00 - 0.40 10*3/mm3    Basophils, Absolute 0.10 0.00 - 0.20 10*3/mm3    nRBC 0.1 0.0 - 0.2 /100 WBC   Scan Slide   Result Value Ref Range    Anisocytosis Slight/1+ None Seen    Hypochromia Slight/1+ None Seen    Poikilocytes Slight/1+ None Seen    WBC Morphology Normal Normal    Platelet Morphology Normal Normal   Type & Screen   Result Value Ref Range    ABO Type O     RH type Negative     Antibody Screen Negative     T&S Expiration Date 12/5/2019 11:59:59 PM    Prepare RBC, 1 Units   Result Value Ref Range    Product Code B5679A65     Unit Number [id number]     UNIT  ABO O     UNIT  RH NEG     Dispense Status IS     Blood Type ONEG     Blood Expiration Date [id number]     Blood Type Barcode 9500      No radiology results for the last day  Medications   sodium chloride 0.9 % flush 10 mL (not administered)   diphenhydrAMINE (BENADRYL) tablet 25 mg (25 mg Oral Given 12/2/19 2136)   acetaminophen (TYLENOL) tablet 650 mg (650 mg Oral Given 12/2/19 2136)     Or   acetaminophen (TYLENOL) suppository 650 mg ( Rectal Not Given:  See Alt 12/2/19 2136)                   MDM  Number of Diagnoses or Management Options  Anemia, unspecified type:   Diagnosis management comments: Medical decision making.  Patient IV established placed on a monitor and he had the above exam and evaluation.  The patient had a recent endoscopies upper and lower which showed no obvious source of bleeding although he did have Bailey's esophagitis and some diverticulosis and hemorrhoids.  The patient's hemoglobin is steadily dropped to work Friday it was reported to be 6.7.  Today is 7.3.  Patient chemistries otherwise unremarkable.  Patient will be transfused  a unit of blood.  This is had progressively worsening anemia and symptomatic and he has a history of hypertension and a previous stroke.  Patient was made aware of the findings.  Hospitalist nurse practitioner notified and he will be admitted to the hospital for further work-up and care stable unremarkable ER course      Final diagnoses:   Anemia, unspecified type              Garcia Benavidez MD  12/02/19 2123       Garcia Benavidez MD  12/02/19 2146

## 2019-12-03 NOTE — PROGRESS NOTES
Discharge Planning Assessment   Michael     Patient Name: Chong Manuel  MRN: [id number]  Today's Date: 12/3/2019    Admit Date: 12/2/2019    Discharge Needs Assessment     Row Name 12/03/19 1045       Living Environment    Lives With  alone    Current Living Arrangements  home/apartment/condo    Primary Care Provided by  self    Provides Primary Care For  no one    Able to Return to Prior Arrangements  yes       Resource/Environmental Concerns    Resource/Environmental Concerns  none    Transportation Concerns  car, none       Transition Planning    Patient/Family Anticipates Transition to  home    Patient/Family Anticipated Services at Transition  none    Transportation Anticipated  car, drives self;family or friend will provide       Discharge Needs Assessment    Readmission Within the Last 30 Days  no previous admission in last 30 days    Concerns to be Addressed  no discharge needs identified;denies needs/concerns at this time    Equipment Currently Used at Home  none    Anticipated Changes Related to Illness  none    Equipment Needed After Discharge  none        Discharge Plan     Row Name 12/03/19 1045       Plan    Plan  Anticipate routine home.     Patient/Family in Agreement with Plan  yes    Plan Comments  Met with patient at bedside who reports no anticipated needs at discharge. Barrier: awaiting anemia workup.              Expected Discharge Date and Time     Expected Discharge Date Expected Discharge Time    Dec 4, 2019         Demographic Summary     Row Name 12/03/19 1044       General Information    Admission Type  observation    Arrived From  home    Required Notices Provided  Observation Status Notice    Referral Source  admission list    Reason for Consult  discharge planning        Functional Status     Row Name 12/03/19 1045       Functional Status    Usual Activity Tolerance  good    Current Activity Tolerance  good       Functional Status, IADL    Medications  independent    Meal  Preparation  independent    Housekeeping  independent    Laundry  independent    Shopping  independent       Mental Status    General Appearance WDL  WDL       Mental Status Summary    Recent Changes in Mental Status/Cognitive Functioning  no changes            Patient Forms     Row Name 12/03/19 1045       Patient Forms    Important Message from Medicare ([facility])  -- [place] 12/2 reg.    Observation Status/Condition Code 44 Notice  Delivered        Asmita Smiley  [phone]

## 2019-12-03 NOTE — ASSESSMENT & PLAN NOTE
- likely from GI loss given LIV    - GI work up was negative for active bleeding in EGD and colonoscopy. Seen by Dr. Reza who is scheduling for capsule endoscopy  - continue to hold aspirin  ( taking for old CVA )  - Hematology consulted   - Iron study - c/w LIV, iv venofer and po iron supplement applied  - received 2 units PRBC with hb up to 8.7  - clinically denies dizziness, feels good

## 2019-12-04 ENCOUNTER — DOCUMENTATION (OUTPATIENT)
Dept: INTERNAL MEDICINE | Facility: HOSPITAL | Age: 74
End: 2019-12-04

## 2019-12-04 PROBLEM — D50.0 IRON DEFICIENCY ANEMIA SECONDARY TO BLOOD LOSS (CHRONIC): Status: ACTIVE | Noted: 2019-11-27

## 2019-12-04 LAB
ABO + RH BLD: NORMAL
ABO + RH BLD: NORMAL
BH BB BLOOD EXPIRATION DATE: NORMAL
BH BB BLOOD EXPIRATION DATE: NORMAL
BH BB BLOOD TYPE BARCODE: 9500
BH BB BLOOD TYPE BARCODE: 9500
BH BB DISPENSE STATUS: NORMAL
BH BB DISPENSE STATUS: NORMAL
BH BB PRODUCT CODE: NORMAL
BH BB PRODUCT CODE: NORMAL
BH BB UNIT NUMBER: NORMAL
BH BB UNIT NUMBER: NORMAL
UNIT  ABO: NORMAL
UNIT  ABO: NORMAL
UNIT  RH: NORMAL
UNIT  RH: NORMAL

## 2019-12-04 NOTE — PROGRESS NOTES
Case Management Discharge Note      Final Note: Home.          Final Discharge Disposition Code: 01 - home or self-care

## 2019-12-04 NOTE — DISCHARGE SUMMARY
[facility]   DISCHARGE SUMMARY     Patient Name: Chong Manuel  : 1945  MRN: [id number]     Date of Admission: 2019  Date of Discharge:  12/3/2019     Primary Care Physician: Garcia Johnson MD             Consults      Date and Time Order Name Status Description     12/3/2019 0030 Inpatient Hematology & Oncology Consult Completed       [id number] Hospitalist (on-call MD unless specified) Completed               Hospital Course      Presenting Problem:   Anemia, unspecified type [D64.9]     Active Hospital Problems:      * Acute on chronic blood loss anemia- (present on admission)     - likely from GI loss given LIV    - GI work up was negative for active bleeding in EGD and colonoscopy. Seen by Dr. Reza who is scheduling for capsule endoscopy  - continue to hold aspirin  ( taking for old CVA )  - Hematology consulted   - Iron study - c/w LIV, iv venofer and po iron supplement applied  - received 2 units PRBC with hb up to 8.7  - clinically denies dizziness, feels good          Chronic GERD     On PPI       Hyperlipidemia     On statin        Essential hypertension     Controlled on home lisinopril and Hytrin              Resolved Hospital Problems:  No notes have been filed under this hospital service.  Service: Hospitalist           Hospital Course:  Chong Manuel is a 74 y.o. male with PMH of GERD, HLD, CVA, HTN who has been anemic lately with neg EGD and colonoscopy. So scheduled for capsule endoscopy by DR. Reza but due to dropping h/h, sent in for further management by PCP. Received 2units PRBC for symptomatic anemia in addition to iv and po iron. He had iv venofer 400mg . Po iron bid was prescribed.  No sign of active bleeding without hematochezia or BRBPR. No obvious sign of hemolysis. Also seen by hematologist but likely needs to rule out GI loss by completing capsule endoscopy.   Iron study is c/w LIV. Normal B12 level. Normal haptoglobin. No obvious sign of hemolysis.   "Microcytosis. Slightly elevated reticulocyte due to anemia.      Stable for discharge today with normal BP.no leukocytosis. Ambulating in the room without difficulty.             /55 (BP Location: Right arm, Patient Position: Lying)   Pulse 60   Temp 98.7 °F (37.1 °C) (Oral)   Resp 18   Ht 172.7 cm (68\")   Wt 75.6 kg (166 lb 9.6 oz)   SpO2 93%   BMI 25.33 kg/m²      General Appearance:    Alert, cooperative, no distress, appears stated age   Head:    Normocephalic, without obvious abnormality, atraumatic   Eyes:    PERRL, conjunctiva/corneas clear, EOM's intact, fundi     benign, both eyes   Ears:    Normal TM's and external ear canals, both ears   Nose:   Nares normal, septum midline, mucosa normal, no drainage     or sinus tenderness   Throat:   Lips, mucosa, and tongue normal; teeth and gums normal   Neck:   Supple, symmetrical, trachea midline, no adenopathy;     thyroid:  no enlargement/tenderness/nodules; no carotid    bruit or JVD   Back:     Symmetric, no curvature, ROM normal, no CVA tenderness   Lungs:     Clear to auscultation bilaterally, respirations unlabored   Chest Wall:    No tenderness or deformity    Heart:    Regular rate and rhythm, S1 and S2 normal, no murmur, rub    or gallop   Breast Exam:    No tenderness, masses, or nipple abnormality   Abdomen:     Soft, non-tender, bowel sounds active all four quadrants,     no masses, no organomegaly   Genitalia:    Normal female without lesion, discharge or tenderness   Rectal:    Normal tone, no masses or tenderness; guaiac negative stool   Extremities:   Extremities normal, atraumatic, no cyanosis or edema   Pulses:   2+ and symmetric all extremities   Skin:   Skin color, texture, turgor normal, no rashes or lesions   Lymph nodes:   Cervical, supraclavicular, and axillary nodes normal  Neurologic:   CNII-XII intact, normal strength, sensation and reflexes     throughout         Hemodynamically stable for discharge.         Discharge " Follow Up Recommendations for labs/diagnostics:           Day of Discharge      HPI:   74 year old male presented to ED for low Hgb. He reports he saw his PCP who told him his Hgb was 6.7 . He reports some general weakness but denies dizziness, chest pain or dyspnea. He denies melena, hematochezia, hematemesis or hematuria. He was recently evaluated by GI and had a colonoscopy and EGD for his anemia . No active bleeding found but he was found to have Barrets esophagus severe diverticulosis and and hemorrhoids. He reports he had anemia in 2012 and they found no explanation. He reports he stopped drinking alcohol in 1991 and stopped smoking. He denies any renal disease. Other labs are unremarkable . He will be admitted for 1 unit PRBC with recheck Hgb and hematology consult for persistent anemia . Past medical history of CVA in 2011with mild residual weakness right arm, GERD and HTN.         Vital Signs:   Temp:  [97.8 °F (36.6 °C)-98.7 °F (37.1 °C)] 98.7 °F (37.1 °C)  Heart Rate:  [56-91] 60  Resp:  [15-18] 18  BP: (103-148)/(41-66) 126/55      Physical Exam:     Constitutional: He is oriented to person, place, and time. He appears well-developed and well-nourished.   HENT:   Head: Normocephalic and atraumatic.   Eyes: EOM are normal.   Neck: Normal range of motion. Neck supple.   Cardiovascular: Normal rate, regular rhythm and normal heart sounds.   Pulmonary/Chest: Effort normal and breath sounds normal.   Abdominal: Soft. Bowel sounds are normal.   Musculoskeletal: Normal range of motion.   Neurological: He is alert and oriented to person, place, and time.   Skin: Skin is warm and dry.   Psychiatric: He has a normal mood and affect. His behavior is normal. Judgment and thought content normal.         Pertinent  and/or Most Recent Results              Results from last 7 days   Lab Units 12/03/19  1052 12/03/19  0322 12/03/19  0034 12/02/19 2037   WBC 10*3/mm3  --  5.00  --  4.70   HEMOGLOBIN g/dL 8.7* 7.5* 7.4*  7.3*   HEMATOCRIT % 27.0* 24.3* 23.6* 23.5*   PLATELETS 10*3/mm3  --  256  --  292   SODIUM mmol/L  --  141  --  139   POTASSIUM mmol/L  --  4.2  --  4.3   CHLORIDE mmol/L  --  108*  --  103   CO2 mmol/L  --  21.0*  --  22.0   BUN mg/dL  --  8  --  9   CREATININE mg/dL  --  1.11  --  1.17   GLUCOSE mg/dL  --  100*  --  96   CALCIUM mg/dL  --  8.8  --  9.6           Results from last 7 days   Lab Units 12/02/19  2037   BILIRUBIN mg/dL 0.2   ALK PHOS U/L 43   ALT (SGPT) U/L 8   AST (SGOT) U/L 17             Invalid input(s): TG, LDLCALC, LDLREALC             Brief Urine Lab Results      None                 Microbiology Results Abnormal      None             Xr Chest 1 View     Result Date: 12/2/2019  Impression: Senescent changes of the thoracic aorta and bony thorax. Evidence of prior granulomatous disease. No acute cardiopulmonary process. There are no prior studies for comparison.  Electronically Signed By-Raimundo Friedman DO. On:12/2/2019 9:57 PM This report was finalized on [id number] by  Raimundo Friedman DO..                Discharge Details               Discharge Medications            New Medications      Instructions Start Date   ferrous sulfate 324 (65 Fe) MG tablet delayed-release EC tablet    324 mg, Oral, 2 Times Daily With Meals                      Continue These Medications      Instructions Start Date   lisinopril 10 MG tablet  Commonly known as:  PRINIVIL,ZESTRIL    10 mg, Oral, Daily        pantoprazole 40 MG EC tablet  Commonly known as:  PROTONIX    40 mg, Oral, Daily        simvastatin 40 MG tablet  Commonly known as:  ZOCOR    40 mg, Oral, Nightly        terazosin 5 MG capsule  Commonly known as:  HYTRIN    5 mg, Oral, Nightly            Stop These Medications    aspirin  MG tablet                      Allergies   Allergen Reactions   • Sulfa Antibiotics Other (See Comments)       Pt unable to specify            Discharge Disposition:  Home or Self Care     Diet:  Hospital:  Diet Order    Procedures   • Diet Regular            Discharge Activity: as tolerated        CODE STATUS:        Code Status and Medical Interventions:   Ordered at: 12/02/19 7900     Code Status:     CPR     Medical Interventions (Level of Support Prior to Arrest):     Full            No future appointments.   Time spent on Discharge including face to face service:  45 minutes     Electronically signed by Fidelina Buck MD, 12/03/19, 5:55 PM

## 2019-12-10 ENCOUNTER — OFFICE (AMBULATORY)
Dept: URBAN - METROPOLITAN AREA CLINIC 64 | Facility: CLINIC | Age: 74
End: 2019-12-10

## 2019-12-10 DIAGNOSIS — Z98.890 OTHER SPECIFIED POSTPROCEDURAL STATES: ICD-10-CM

## 2019-12-10 DIAGNOSIS — D50.0 IRON DEFICIENCY ANEMIA SECONDARY TO BLOOD LOSS (CHRONIC): ICD-10-CM

## 2019-12-10 PROCEDURE — 91110 GI TRC IMG INTRAL ESOPH-ILE: CPT | Performed by: INTERNAL MEDICINE

## 2021-04-19 ENCOUNTER — OFFICE VISIT (OUTPATIENT)
Dept: CARDIOLOGY | Facility: CLINIC | Age: 76
End: 2021-04-19

## 2021-04-19 ENCOUNTER — TELEPHONE (OUTPATIENT)
Dept: CARDIOLOGY | Facility: CLINIC | Age: 76
End: 2021-04-19

## 2021-04-19 VITALS
WEIGHT: 171 LBS | HEIGHT: 68 IN | OXYGEN SATURATION: 95 % | DIASTOLIC BLOOD PRESSURE: 57 MMHG | BODY MASS INDEX: 25.91 KG/M2 | HEART RATE: 65 BPM | SYSTOLIC BLOOD PRESSURE: 162 MMHG

## 2021-04-19 DIAGNOSIS — I10 ESSENTIAL HYPERTENSION: ICD-10-CM

## 2021-04-19 DIAGNOSIS — R07.89 CHEST DISCOMFORT: ICD-10-CM

## 2021-04-19 DIAGNOSIS — I35.0 AORTIC STENOSIS, SEVERE: Primary | ICD-10-CM

## 2021-04-19 DIAGNOSIS — Z01.818 PRE-OP TESTING: Primary | ICD-10-CM

## 2021-04-19 DIAGNOSIS — R06.02 SHORTNESS OF BREATH: ICD-10-CM

## 2021-04-19 PROCEDURE — 99204 OFFICE O/P NEW MOD 45 MIN: CPT | Performed by: INTERNAL MEDICINE

## 2021-04-19 RX ORDER — SIMVASTATIN 20 MG
20 TABLET ORAL
COMMUNITY
Start: 2021-03-03 | End: 2021-04-19

## 2021-04-19 RX ORDER — ALBUTEROL SULFATE 90 UG/1
2 AEROSOL, METERED RESPIRATORY (INHALATION) EVERY 4 HOURS PRN
COMMUNITY
Start: 2021-04-12 | End: 2021-10-04

## 2021-04-19 RX ORDER — CEPHALEXIN 500 MG/1
CAPSULE ORAL
COMMUNITY
Start: 2021-04-12 | End: 2021-04-19

## 2021-04-19 RX ORDER — PREDNISONE 10 MG/1
TABLET ORAL
COMMUNITY
Start: 2021-04-12 | End: 2021-04-19

## 2021-04-19 RX ORDER — LISINOPRIL 20 MG/1
20 TABLET ORAL DAILY
Qty: 90 TABLET | Refills: 1 | Status: SHIPPED | OUTPATIENT
Start: 2021-04-19 | End: 2021-08-10 | Stop reason: HOSPADM

## 2021-04-19 NOTE — PROGRESS NOTES
Date of Office Visit: 2021  Encounter Provider: Dr. Darian Fajardo    Place of Service: [facility] CARDIOLOGY [place]  Patient Name: Chong Manuel  :1945  Garcia Johnson MD    Chief Complaint   Patient presents with   • Consult   • Hypertension   • Shortness of Breath  Aortic stenosis     History of Present Illness:    I am pleased to see Mr. Brown in my office today as a new consultation.    As you know, patient is 75 years old white gentleman whose past medical history is significant for hypertension, hyperlipidemia, who is referred to me for symptom of shortness of breath and abnormal echocardiogram.    Patient reports that from last 6 to 12 months he is getting progressively more short of breath.  He has to stop multiple times when he does any activity.  Patient recently was so short of breath.  Patient had to go to the hospital and was noted to be in mild congestive heart failure.  Patient was treated appropriately and was discharged.  Echocardiogram was done it showed preserved left ventricle function with EF of 55 to 60% and severe aortic stenosis was noted with peak gradient of 100 mmHg.  Patient came today for further recommendation and evaluation.    Patient complains of occasional chest pain.  He denies any syncope or presyncope.  No leg edema noted.    Patient does not have previous history of CAD or PCI.  In , patient underwent cardiac catheterization which showed no significant CAD.    Patient does not smoke or abuse alcohol    I would recommend to proceed with RAFAT and left and right heart catheterization with intention that patient would need aortic valve replacement.        Past Medical History:   Diagnosis Date   • Anemia    • GERD (gastroesophageal reflux disease)    • Hypertension    • Shortness of breath 2021         Past Surgical History:   Procedure Laterality Date   • CARDIAC SURGERY     • COLONOSCOPY             Current Outpatient Medications:   •  albuterol  "sulfate  (90 Base) MCG/ACT inhaler, Inhale 2 puffs Every 4 (Four) Hours As Needed., Disp: , Rfl:   •  lisinopril (PRINIVIL,ZESTRIL) 10 MG tablet, Take 10 mg by mouth Daily., Disp: , Rfl:   •  pantoprazole (PROTONIX) 40 MG EC tablet, Take 40 mg by mouth Daily., Disp: , Rfl:   •  simvastatin (ZOCOR) 40 MG tablet, Take 40 mg by mouth Every Night., Disp: , Rfl:   •  terazosin (HYTRIN) 5 MG capsule, Take 5 mg by mouth Every Night., Disp: , Rfl:       Social History     Socioeconomic History   • Marital status: Single     Spouse name: Not on file   • Number of children: Not on file   • Years of education: Not on file   • Highest education level: Not on file   Tobacco Use   • Smoking status: Former Smoker   • Smokeless tobacco: Never Used   Vaping Use   • Vaping Use: Never used   Substance and Sexual Activity   • Alcohol use: No   • Drug use: No   • Sexual activity: Defer         Review of Systems   Constitutional: Negative for chills and fever.   HENT: Negative for ear discharge and nosebleeds.    Eyes: Negative for discharge and redness.   Cardiovascular: Positive for chest pain. Negative for orthopnea, palpitations, paroxysmal nocturnal dyspnea and syncope.   Respiratory: Positive for shortness of breath. Negative for cough and wheezing.    Endocrine: Negative for heat intolerance.   Skin: Negative for rash.   Musculoskeletal: Positive for arthritis and joint pain. Negative for myalgias.   Gastrointestinal: Negative for abdominal pain, melena, nausea and vomiting.   Genitourinary: Negative for dysuria and hematuria.   Neurological: Negative for dizziness, light-headedness, numbness and tremors.   Psychiatric/Behavioral: Negative for depression. The patient is not nervous/anxious.        Procedures    Procedures    ECG 12 Lead    (Results Pending)           Objective:    /57   Pulse 65   Ht 172.7 cm (67.99\")   Wt 77.6 kg (171 lb)   SpO2 95%   BMI 26.01 kg/m²         Constitutional:       Appearance: " Well-developed.   Eyes:      General: No scleral icterus.        Right eye: No discharge.   HENT:      Head: Normocephalic and atraumatic.   Neck:      Thyroid: No thyromegaly.      Lymphadenopathy: No cervical adenopathy.   Pulmonary:      Effort: Pulmonary effort is normal. No respiratory distress.      Breath sounds: Normal breath sounds. No wheezing. No rales.   Cardiovascular:      Normal rate. Regular rhythm.      Murmurs: There is a grade 4/6 systolic murmur.      No gallop.   Abdominal:      Tenderness: There is no abdominal tenderness.   Skin:     Findings: No erythema or rash.   Neurological:      Mental Status: Alert and oriented to person, place, and time.             Assessment:       Diagnosis Plan   1. Aortic stenosis, severe  Adult Transesophageal Echo (RAFAT) W/ Cont if Necessary Per Protocol    Case Request Cath Lab: Left Heart Cath    CBC (No Diff)    Basic Metabolic Panel    Protime-INR    aPTT    ECG 12 Lead   2. Essential hypertension  Adult Transesophageal Echo (RAFAT) W/ Cont if Necessary Per Protocol    Case Request Cath Lab: Left Heart Cath    CBC (No Diff)    Basic Metabolic Panel    Protime-INR    aPTT    ECG 12 Lead   3. Shortness of breath  Adult Transesophageal Echo (RAFAT) W/ Cont if Necessary Per Protocol    Case Request Cath Lab: Left Heart Cath    CBC (No Diff)    Basic Metabolic Panel    Protime-INR    aPTT    ECG 12 Lead   4. Chest discomfort  Adult Transesophageal Echo (RAFAT) W/ Cont if Necessary Per Protocol    Case Request Cath Lab: Left Heart Cath    CBC (No Diff)    Basic Metabolic Panel    Protime-INR    aPTT    ECG 12 Lead            Plan:       MDM:    1.  Severe aortic stenosis:    I would recommend to proceed with RAFAT and right and left heart catheterization.    2.  Shortness of breath:    Most likely it is due to underlying severe aortic stenosis.  Patient probably would need heart cath to rule out underlying coronary artery disease      3.  Chest discomfort    Patient would  need cardiac catheterization    4.  Hypertension:    I would recommend to increase lisinopril to 20 mg daily

## 2021-04-19 NOTE — H&P (VIEW-ONLY)
Date of Office Visit: 2021  Encounter Provider: Dr. Darian Fajardo    Place of Service: [facility] CARDIOLOGY [place]  Patient Name: Chong Manuel  :1945  Garcia Johnson MD    Chief Complaint   Patient presents with   • Consult   • Hypertension   • Shortness of Breath  Aortic stenosis     History of Present Illness:    I am pleased to see Mr. Brown in my office today as a new consultation.    As you know, patient is 75 years old white gentleman whose past medical history is significant for hypertension, hyperlipidemia, who is referred to me for symptom of shortness of breath and abnormal echocardiogram.    Patient reports that from last 6 to 12 months he is getting progressively more short of breath.  He has to stop multiple times when he does any activity.  Patient recently was so short of breath.  Patient had to go to the hospital and was noted to be in mild congestive heart failure.  Patient was treated appropriately and was discharged.  Echocardiogram was done it showed preserved left ventricle function with EF of 55 to 60% and severe aortic stenosis was noted with peak gradient of 100 mmHg.  Patient came today for further recommendation and evaluation.    Patient complains of occasional chest pain.  He denies any syncope or presyncope.  No leg edema noted.    Patient does not have previous history of CAD or PCI.  In , patient underwent cardiac catheterization which showed no significant CAD.    Patient does not smoke or abuse alcohol    I would recommend to proceed with RAFAT and left and right heart catheterization with intention that patient would need aortic valve replacement.        Past Medical History:   Diagnosis Date   • Anemia    • GERD (gastroesophageal reflux disease)    • Hypertension    • Shortness of breath 2021         Past Surgical History:   Procedure Laterality Date   • CARDIAC SURGERY     • COLONOSCOPY             Current Outpatient Medications:   •  albuterol  "sulfate  (90 Base) MCG/ACT inhaler, Inhale 2 puffs Every 4 (Four) Hours As Needed., Disp: , Rfl:   •  lisinopril (PRINIVIL,ZESTRIL) 10 MG tablet, Take 10 mg by mouth Daily., Disp: , Rfl:   •  pantoprazole (PROTONIX) 40 MG EC tablet, Take 40 mg by mouth Daily., Disp: , Rfl:   •  simvastatin (ZOCOR) 40 MG tablet, Take 40 mg by mouth Every Night., Disp: , Rfl:   •  terazosin (HYTRIN) 5 MG capsule, Take 5 mg by mouth Every Night., Disp: , Rfl:       Social History     Socioeconomic History   • Marital status: Single     Spouse name: Not on file   • Number of children: Not on file   • Years of education: Not on file   • Highest education level: Not on file   Tobacco Use   • Smoking status: Former Smoker   • Smokeless tobacco: Never Used   Vaping Use   • Vaping Use: Never used   Substance and Sexual Activity   • Alcohol use: No   • Drug use: No   • Sexual activity: Defer         Review of Systems   Constitutional: Negative for chills and fever.   HENT: Negative for ear discharge and nosebleeds.    Eyes: Negative for discharge and redness.   Cardiovascular: Positive for chest pain. Negative for orthopnea, palpitations, paroxysmal nocturnal dyspnea and syncope.   Respiratory: Positive for shortness of breath. Negative for cough and wheezing.    Endocrine: Negative for heat intolerance.   Skin: Negative for rash.   Musculoskeletal: Positive for arthritis and joint pain. Negative for myalgias.   Gastrointestinal: Negative for abdominal pain, melena, nausea and vomiting.   Genitourinary: Negative for dysuria and hematuria.   Neurological: Negative for dizziness, light-headedness, numbness and tremors.   Psychiatric/Behavioral: Negative for depression. The patient is not nervous/anxious.        Procedures    Procedures    ECG 12 Lead    (Results Pending)           Objective:    /57   Pulse 65   Ht 172.7 cm (67.99\")   Wt 77.6 kg (171 lb)   SpO2 95%   BMI 26.01 kg/m²         Constitutional:       Appearance: " Well-developed.   Eyes:      General: No scleral icterus.        Right eye: No discharge.   HENT:      Head: Normocephalic and atraumatic.   Neck:      Thyroid: No thyromegaly.      Lymphadenopathy: No cervical adenopathy.   Pulmonary:      Effort: Pulmonary effort is normal. No respiratory distress.      Breath sounds: Normal breath sounds. No wheezing. No rales.   Cardiovascular:      Normal rate. Regular rhythm.      Murmurs: There is a grade 4/6 systolic murmur.      No gallop.   Abdominal:      Tenderness: There is no abdominal tenderness.   Skin:     Findings: No erythema or rash.   Neurological:      Mental Status: Alert and oriented to person, place, and time.             Assessment:       Diagnosis Plan   1. Aortic stenosis, severe  Adult Transesophageal Echo (RAFAT) W/ Cont if Necessary Per Protocol    Case Request Cath Lab: Left Heart Cath    CBC (No Diff)    Basic Metabolic Panel    Protime-INR    aPTT    ECG 12 Lead   2. Essential hypertension  Adult Transesophageal Echo (RAFAT) W/ Cont if Necessary Per Protocol    Case Request Cath Lab: Left Heart Cath    CBC (No Diff)    Basic Metabolic Panel    Protime-INR    aPTT    ECG 12 Lead   3. Shortness of breath  Adult Transesophageal Echo (RAFAT) W/ Cont if Necessary Per Protocol    Case Request Cath Lab: Left Heart Cath    CBC (No Diff)    Basic Metabolic Panel    Protime-INR    aPTT    ECG 12 Lead   4. Chest discomfort  Adult Transesophageal Echo (RAFAT) W/ Cont if Necessary Per Protocol    Case Request Cath Lab: Left Heart Cath    CBC (No Diff)    Basic Metabolic Panel    Protime-INR    aPTT    ECG 12 Lead            Plan:       MDM:    1.  Severe aortic stenosis:    I would recommend to proceed with RAFAT and right and left heart catheterization.    2.  Shortness of breath:    Most likely it is due to underlying severe aortic stenosis.  Patient probably would need heart cath to rule out underlying coronary artery disease      3.  Chest discomfort    Patient would  need cardiac catheterization    4.  Hypertension:    I would recommend to increase lisinopril to 20 mg daily

## 2021-04-24 ENCOUNTER — LAB (OUTPATIENT)
Dept: LAB | Facility: HOSPITAL | Age: 76
End: 2021-04-24

## 2021-04-24 ENCOUNTER — HOSPITAL ENCOUNTER (OUTPATIENT)
Dept: CARDIOLOGY | Facility: HOSPITAL | Age: 76
Discharge: HOME | End: 2021-04-24

## 2021-04-24 ENCOUNTER — APPOINTMENT (OUTPATIENT)
Dept: CARDIOLOGY | Facility: HOSPITAL | Age: 76
End: 2021-04-24

## 2021-04-24 DIAGNOSIS — I10 ESSENTIAL HYPERTENSION: ICD-10-CM

## 2021-04-24 DIAGNOSIS — R06.02 SHORTNESS OF BREATH: ICD-10-CM

## 2021-04-24 DIAGNOSIS — I35.0 AORTIC STENOSIS, SEVERE: ICD-10-CM

## 2021-04-24 DIAGNOSIS — Z01.818 PRE-OP TESTING: ICD-10-CM

## 2021-04-24 DIAGNOSIS — R07.89 CHEST DISCOMFORT: ICD-10-CM

## 2021-04-24 LAB
ANION GAP SERPL CALCULATED.3IONS-SCNC: 10.8 MMOL/L (ref 5–15)
APTT PPP: 27 SECONDS (ref 24–31)
BUN SERPL-MCNC: 10 MG/DL (ref 8–23)
BUN/CREAT SERPL: 8.8 (ref 7–25)
CALCIUM SPEC-SCNC: 9.2 MG/DL (ref 8.6–10.5)
CHLORIDE SERPL-SCNC: 104 MMOL/L (ref 98–107)
CO2 SERPL-SCNC: 24.2 MMOL/L (ref 22–29)
CREAT SERPL-MCNC: 1.14 MG/DL (ref 0.76–1.27)
DEPRECATED RDW RBC AUTO: 39.5 FL (ref 37–54)
ERYTHROCYTE [DISTWIDTH] IN BLOOD BY AUTOMATED COUNT: 12.2 % (ref 12.3–15.4)
GFR SERPL CREATININE-BSD FRML MDRD: 62 ML/MIN/1.73
GLUCOSE SERPL-MCNC: 87 MG/DL (ref 65–99)
HCT VFR BLD AUTO: 31.7 % (ref 37.5–51)
HGB BLD-MCNC: 10.7 G/DL (ref 13–17.7)
INR PPP: 0.98 (ref 0.93–1.1)
MCH RBC QN AUTO: 29.9 PG (ref 26.6–33)
MCHC RBC AUTO-ENTMCNC: 33.8 G/DL (ref 31.5–35.7)
MCV RBC AUTO: 88.5 FL (ref 79–97)
PLATELET # BLD AUTO: 247 10*3/MM3 (ref 140–450)
PMV BLD AUTO: 10 FL (ref 6–12)
POTASSIUM SERPL-SCNC: 4.6 MMOL/L (ref 3.5–5.2)
PROTHROMBIN TIME: 10.8 SECONDS (ref 9.6–11.7)
QT INTERVAL: 398 MS
RBC # BLD AUTO: 3.58 10*6/MM3 (ref 4.14–5.8)
SARS-COV-2 ORF1AB RESP QL NAA+PROBE: NOT DETECTED
SODIUM SERPL-SCNC: 139 MMOL/L (ref 136–145)
WBC # BLD AUTO: 7.83 10*3/MM3 (ref 3.4–10.8)

## 2021-04-24 PROCEDURE — C9803 HOPD COVID-19 SPEC COLLECT: HCPCS

## 2021-04-24 PROCEDURE — 93005 ELECTROCARDIOGRAM TRACING: CPT | Performed by: INTERNAL MEDICINE

## 2021-04-24 PROCEDURE — 85610 PROTHROMBIN TIME: CPT

## 2021-04-24 PROCEDURE — U0004 COV-19 TEST NON-CDC HGH THRU: HCPCS

## 2021-04-24 PROCEDURE — 85027 COMPLETE CBC AUTOMATED: CPT

## 2021-04-24 PROCEDURE — 80048 BASIC METABOLIC PNL TOTAL CA: CPT

## 2021-04-24 PROCEDURE — 85730 THROMBOPLASTIN TIME PARTIAL: CPT

## 2021-04-24 PROCEDURE — 36415 COLL VENOUS BLD VENIPUNCTURE: CPT

## 2021-04-27 ENCOUNTER — ANESTHESIA (OUTPATIENT)
Dept: CARDIOLOGY | Facility: HOSPITAL | Age: 76
End: 2021-04-27

## 2021-04-27 ENCOUNTER — HOSPITAL ENCOUNTER (OUTPATIENT)
Dept: CARDIOLOGY | Facility: HOSPITAL | Age: 76
Discharge: HOME | End: 2021-04-27

## 2021-04-27 ENCOUNTER — HOSPITAL ENCOUNTER (OUTPATIENT)
Facility: HOSPITAL | Age: 76
Setting detail: HOSPITAL OUTPATIENT SURGERY
Discharge: HOME | End: 2021-04-27
Attending: INTERNAL MEDICINE | Admitting: INTERNAL MEDICINE

## 2021-04-27 ENCOUNTER — ANESTHESIA EVENT (OUTPATIENT)
Dept: CARDIOLOGY | Facility: HOSPITAL | Age: 76
End: 2021-04-27

## 2021-04-27 VITALS
BODY MASS INDEX: 25.49 KG/M2 | HEIGHT: 68 IN | WEIGHT: 168.21 LBS | DIASTOLIC BLOOD PRESSURE: 54 MMHG | OXYGEN SATURATION: 96 % | HEART RATE: 72 BPM | TEMPERATURE: 98 F | RESPIRATION RATE: 12 BRPM | SYSTOLIC BLOOD PRESSURE: 138 MMHG

## 2021-04-27 VITALS
SYSTOLIC BLOOD PRESSURE: 81 MMHG | DIASTOLIC BLOOD PRESSURE: 39 MMHG | RESPIRATION RATE: 15 BRPM | DIASTOLIC BLOOD PRESSURE: 68 MMHG | OXYGEN SATURATION: 100 % | SYSTOLIC BLOOD PRESSURE: 104 MMHG | HEART RATE: 72 BPM | OXYGEN SATURATION: 100 %

## 2021-04-27 DIAGNOSIS — R07.89 CHEST DISCOMFORT: ICD-10-CM

## 2021-04-27 DIAGNOSIS — R06.02 SHORTNESS OF BREATH: ICD-10-CM

## 2021-04-27 DIAGNOSIS — I10 ESSENTIAL HYPERTENSION: ICD-10-CM

## 2021-04-27 DIAGNOSIS — I35.0 AORTIC STENOSIS, SEVERE: ICD-10-CM

## 2021-04-27 PROCEDURE — 0 IOPAMIDOL PER 1 ML: Performed by: INTERNAL MEDICINE

## 2021-04-27 PROCEDURE — 99153 MOD SED SAME PHYS/QHP EA: CPT | Performed by: INTERNAL MEDICINE

## 2021-04-27 PROCEDURE — C1894 INTRO/SHEATH, NON-LASER: HCPCS | Performed by: INTERNAL MEDICINE

## 2021-04-27 PROCEDURE — C1769 GUIDE WIRE: HCPCS | Performed by: INTERNAL MEDICINE

## 2021-04-27 PROCEDURE — 93460 R&L HRT ART/VENTRICLE ANGIO: CPT | Performed by: INTERNAL MEDICINE

## 2021-04-27 PROCEDURE — 93312 ECHO TRANSESOPHAGEAL: CPT

## 2021-04-27 PROCEDURE — 25010000002 FENTANYL CITRATE (PF) 100 MCG/2ML SOLUTION: Performed by: INTERNAL MEDICINE

## 2021-04-27 PROCEDURE — 93312 ECHO TRANSESOPHAGEAL: CPT | Performed by: INTERNAL MEDICINE

## 2021-04-27 PROCEDURE — 93320 DOPPLER ECHO COMPLETE: CPT

## 2021-04-27 PROCEDURE — 25010000002 MIDAZOLAM PER 1 MG: Performed by: INTERNAL MEDICINE

## 2021-04-27 PROCEDURE — 25010000002 PROPOFOL 10 MG/ML EMULSION: Performed by: ANESTHESIOLOGY

## 2021-04-27 PROCEDURE — 93325 DOPPLER ECHO COLOR FLOW MAPG: CPT

## 2021-04-27 PROCEDURE — 93325 DOPPLER ECHO COLOR FLOW MAPG: CPT | Performed by: INTERNAL MEDICINE

## 2021-04-27 PROCEDURE — 99152 MOD SED SAME PHYS/QHP 5/>YRS: CPT | Performed by: INTERNAL MEDICINE

## 2021-04-27 PROCEDURE — 93320 DOPPLER ECHO COMPLETE: CPT | Performed by: INTERNAL MEDICINE

## 2021-04-27 RX ORDER — SODIUM CHLORIDE 9 MG/ML
INJECTION, SOLUTION INTRAVENOUS CONTINUOUS PRN
Status: DISCONTINUED | OUTPATIENT
Start: 2021-04-27 | End: 2021-04-27 | Stop reason: SURG

## 2021-04-27 RX ORDER — FENTANYL CITRATE 50 UG/ML
INJECTION, SOLUTION INTRAMUSCULAR; INTRAVENOUS AS NEEDED
Status: DISCONTINUED | OUTPATIENT
Start: 2021-04-27 | End: 2021-04-27 | Stop reason: HOSPADM

## 2021-04-27 RX ORDER — PROPOFOL 10 MG/ML
VIAL (ML) INTRAVENOUS AS NEEDED
Status: DISCONTINUED | OUTPATIENT
Start: 2021-04-27 | End: 2021-04-27 | Stop reason: SURG

## 2021-04-27 RX ORDER — MIDAZOLAM HYDROCHLORIDE 1 MG/ML
INJECTION INTRAMUSCULAR; INTRAVENOUS AS NEEDED
Status: DISCONTINUED | OUTPATIENT
Start: 2021-04-27 | End: 2021-04-27 | Stop reason: HOSPADM

## 2021-04-27 RX ORDER — LIDOCAINE HYDROCHLORIDE 20 MG/ML
INJECTION, SOLUTION INFILTRATION; PERINEURAL AS NEEDED
Status: DISCONTINUED | OUTPATIENT
Start: 2021-04-27 | End: 2021-04-27 | Stop reason: HOSPADM

## 2021-04-27 RX ORDER — SODIUM CHLORIDE 9 MG/ML
250 INJECTION, SOLUTION INTRAVENOUS ONCE AS NEEDED
Status: DISCONTINUED | OUTPATIENT
Start: 2021-04-27 | End: 2021-04-27 | Stop reason: HOSPADM

## 2021-04-27 RX ADMIN — SODIUM CHLORIDE: 0.9 INJECTION, SOLUTION INTRAVENOUS at 12:08

## 2021-04-27 RX ADMIN — PROPOFOL 280 MG: 10 INJECTION, EMULSION INTRAVENOUS at 12:20

## 2021-04-27 NOTE — NURSING NOTE
Dr. Fajardo at bedside assessing pt and hematoma/groin site.  Dr. Fajardo states we can keep pt an extra hour before DCing this evening.

## 2021-04-27 NOTE — ANESTHESIA PREPROCEDURE EVALUATION
Anesthesia Evaluation     Patient summary reviewed and Nursing notes reviewed   NPO Solid Status: > 8 hours  NPO Liquid Status: > 8 hours           Airway   Mallampati: II  TM distance: >3 FB  Neck ROM: full  No difficulty expected  Dental - normal exam     Pulmonary    (+) shortness of breath,   Cardiovascular     (+) hypertension, valvular problems/murmurs AS, hyperlipidemia,       Neuro/Psych  (+) CVA,     GI/Hepatic/Renal/Endo    (+)  GERD,      Musculoskeletal     Abdominal    Substance History      OB/GYN          Other        ROS/Med Hx Other: Severe AS                  Anesthesia Plan    ASA 4     MAC     intravenous induction     Anesthetic plan, all risks, benefits, and alternatives have been provided, discussed and informed consent has been obtained with: patient.

## 2021-04-28 NOTE — DISCHARGE INSTRUCTIONS
1) Drink plenty of fluids for the next 24 hours.  This helps to eliminate the dye used in your procedure through urination.  You may resume a normal diet; however, try to avoid foods that would cause gas or constipation.    2) Sedative medication given to you during your catheterization may decrease your judgement and reaction time for up to 24-48 hours.  Therefore:  a. DO NOT drive or operate hazardous machinery (48 hours)  b. DO NOT consume alcoholic beverages  c. DO NOT make any important/legal decisions  d. Have someone stay with you for at least 24 hours    3) To allow proper healing and prevent bleeding, the following activities are to be strictly avoided for the next 24-48 hours:  a. Excessive bending at wound site  b. Straining (anything that would tense up muscles around the affected puncture site)  c. Lifting objects greater than 5 pounds, pushing, or pulling for 5 days  i. For Groin Cases:  1. Refrain from sexual activity  2. Refrain from running or vigorous walking  3. No prolonged sitting or standing  4. Limit stair climbing as much as possible    4) Keep the puncture site clean and dry.  You may remove the dressing tomorrow and replace it with a band-aid for at least one additional day.  Gently clean the site with mild soap and water.  No scrubbing/rubbing and lightly pat the area dry.  Showers are acceptable; however, avoid submerging in water (tub baths, hot tubs, swimming pools, dishwater, etc…) for at least one week.  The site should be completely healed before resuming these activities to reduce the risk of infection.  Check the site often.  Watch for signs and symptoms of infection and notify your physician if any of the following occur:  a. Bleeding or an increase in swelling at the puncture site  b. Fever  c. Increased soreness around puncture site  d. Foul odor or significant drainage from the puncture site  e. Swelling, redness, or warmth at the puncture site    **A bruise or small “pea  sized” lump under the skin at the puncture site is not unusual.  This should disappear within 3-4 weeks.**    5) CONTACT YOUR PHYSICIAN OR CALL 911 IF YOU EXPERIENCE ANY OF THE FOLLOWING:  a. Increased angina (chest pain) or frequent sensations of pressure, burning, pain, or other discomfort in the chest, arm, jaws, or stomach  b. Lightheadedness, dizziness, faint feeling, sweating, or difficulty breathing  c. Odd sensation changes like numbness, tingling, coldness, or pain in the arm or leg in which the catheter was inserted  d. Limb in which the catheter was inserted becomes pale/bluish in color    IMPORTANT:  Although this occurs very rarely, if you should develop bright red or excessive bleeding, feel a “pop” inside at the insertion site, or notice a sudden increase in swelling larger than a walnut, you should call 911.  Hold continuous firm pressure to the access site until emergency personnel arrive.  It is best if someone else can do this for you.

## 2021-04-28 NOTE — ANESTHESIA POSTPROCEDURE EVALUATION
Patient: Chong Manuel    Procedure Summary     Date: 04/27/21 Room / Location: [facility]    Anesthesia Start: 1208 Anesthesia Stop: 1249    Procedure: ADULT TRANSESOPHAGEAL ECHO (RAFAT) W/ CONT IF NECESSARY PER PROTOCOL Diagnosis:       Essential hypertension      Shortness of breath      Chest discomfort      Aortic stenosis, severe      (Valvular Disease)    Scheduled Providers:  Provider: Zenon Benoit MD    Anesthesia Type: MAC ASA Status: 4          Anesthesia Type: MAC    Vitals  Vitals Value Taken Time   /54 04/27/21 2000   Temp     Pulse 66 04/27/21 2000   Resp 15 04/27/21 1249   SpO2 96 % 04/27/21 2000           Post Anesthesia Care and Evaluation    Patient location during evaluation: PACU  Patient participation: complete - patient participated  Level of consciousness: awake  Pain scale: See nurse's notes for pain score.  Pain management: adequate  Airway patency: patent  Anesthetic complications: No anesthetic complications  PONV Status: none  Cardiovascular status: acceptable  Respiratory status: acceptable  Hydration status: acceptable    Comments: Patient seen and examined postoperatively; vital signs stable; SpO2 greater than or equal to 90%; cardiopulmonary status stable; nausea/vomiting adequately controlled; pain adequately controlled; no apparent anesthesia complications; patient discharged from anesthesia care when discharge criteria were met

## 2021-04-29 LAB
BH CV ECHO MEAS - AO MAX PG (FULL): 89.3 MMHG
BH CV ECHO MEAS - AO MAX PG: 93.5 MMHG
BH CV ECHO MEAS - AO MEAN PG (FULL): 44.5 MMHG
BH CV ECHO MEAS - AO MEAN PG: 47.4 MMHG
BH CV ECHO MEAS - AO V2 MAX: 483.5 CM/SEC
BH CV ECHO MEAS - AO V2 MEAN: 307.4 CM/SEC
BH CV ECHO MEAS - AO V2 VTI: 112.3 CM
BH CV ECHO MEAS - AORTIC HR: 64.3 BPM
BH CV ECHO MEAS - AORTIC R-R: 0.93 SEC
BH CV ECHO MEAS - AVA(I,A): 0.54 CM^2
BH CV ECHO MEAS - AVA(I,D): 0.54 CM^2
BH CV ECHO MEAS - AVA(V,A): 0.65 CM^2
BH CV ECHO MEAS - AVA(V,D): 0.65 CM^2
BH CV ECHO MEAS - BSA(HAYCOCK): 1.9 M^2
BH CV ECHO MEAS - BSA: 1.9 M^2
BH CV ECHO MEAS - BZI_BMI: 26.2 KILOGRAMS/M^2
BH CV ECHO MEAS - BZI_METRIC_HEIGHT: 172.7 CM
BH CV ECHO MEAS - BZI_METRIC_WEIGHT: 78 KG
BH CV ECHO MEAS - CI(LVOT): 2 L/MIN/M^2
BH CV ECHO MEAS - CO(LVOT): 3.9 L/MIN
BH CV ECHO MEAS - LV MAX PG: 4.2 MMHG
BH CV ECHO MEAS - LV MEAN PG: 3 MMHG
BH CV ECHO MEAS - LV V1 MAX: 102.4 CM/SEC
BH CV ECHO MEAS - LV V1 MEAN: 82 CM/SEC
BH CV ECHO MEAS - LV V1 VTI: 19.7 CM
BH CV ECHO MEAS - LVOT AREA: 3.1 CM^2
BH CV ECHO MEAS - LVOT DIAM: 2 CM
BH CV ECHO MEAS - SI(LVOT): 31.3 ML/M^2
BH CV ECHO MEAS - SV(LVOT): 60.1 ML
MAXIMAL PREDICTED HEART RATE: 144 BPM
STRESS TARGET HR: 122 BPM

## 2021-05-06 ENCOUNTER — OFFICE VISIT (OUTPATIENT)
Dept: CARDIAC SURGERY | Facility: CLINIC | Age: 76
End: 2021-05-06

## 2021-05-06 VITALS
OXYGEN SATURATION: 91 % | DIASTOLIC BLOOD PRESSURE: 53 MMHG | SYSTOLIC BLOOD PRESSURE: 163 MMHG | HEART RATE: 67 BPM | TEMPERATURE: 98 F | WEIGHT: 173.5 LBS | RESPIRATION RATE: 20 BRPM | BODY MASS INDEX: 26.3 KG/M2 | HEIGHT: 68 IN

## 2021-05-06 DIAGNOSIS — R06.02 SHORTNESS OF BREATH: ICD-10-CM

## 2021-05-06 DIAGNOSIS — E78.5 HYPERLIPIDEMIA, UNSPECIFIED HYPERLIPIDEMIA TYPE: ICD-10-CM

## 2021-05-06 DIAGNOSIS — I35.0 AORTIC STENOSIS, SEVERE: ICD-10-CM

## 2021-05-06 DIAGNOSIS — R07.89 CHEST DISCOMFORT: ICD-10-CM

## 2021-05-06 DIAGNOSIS — K21.9 CHRONIC GERD: ICD-10-CM

## 2021-05-06 DIAGNOSIS — R91.1 SOLITARY PULMONARY NODULE: ICD-10-CM

## 2021-05-06 DIAGNOSIS — I10 ESSENTIAL HYPERTENSION: Primary | ICD-10-CM

## 2021-05-06 DIAGNOSIS — I63.9 CEREBROVASCULAR ACCIDENT (CVA), UNSPECIFIED MECHANISM (HCC): Chronic | ICD-10-CM

## 2021-05-06 DIAGNOSIS — Z01.818 PREOP TESTING: ICD-10-CM

## 2021-05-06 DIAGNOSIS — E78.00 PURE HYPERCHOLESTEROLEMIA: ICD-10-CM

## 2021-05-06 PROCEDURE — 99204 OFFICE O/P NEW MOD 45 MIN: CPT | Performed by: THORACIC SURGERY (CARDIOTHORACIC VASCULAR SURGERY)

## 2021-05-09 NOTE — PROGRESS NOTES
5/8/2021    Seen on 5/6/2021    Chief Complaint     Dyspnea, evaluation of severe aortic stenosis    History of Present Illness:       Dear Darian and Colleagues,  It was nice to see Chong Manuel in consultation at your request. He is a 75 y.o. male with a history of hypertension, hyperlipidemia, GERD, small stroke who has developed progressive dyspnea exertion and fatigue.  Over the last 12 months he states that he has been getting progressively more short of breath.  He has to stop multiple times when he does any activity he has difficulty in climbing stairs.  He had few episode of chest discomfort, there were mild, substernal and self resolved.  He had an admission for mild congestive heart failure and a time a echo current was performed and showed a preserved LV function with an EF of 60% and severe stenosis with peak gradient of 100 mmHg, aortic valve area 0.54 cm², and a peak velocity of 4.8 m/s with a mean gradient of 47.4 mmHg.  He had a cardiac cath that showed an ejection fraction of 60% and nonocclusive coronary artery disease and aortic valve area of 0.8 cm² and a gradient on pullback of 38.1 mmHg. He denies syncope, recent TIA, orthopnea, PND, lower extremity edema or claudication.  Denies a history of rheumatic fever or endocarditis.  He denies a history of aneurysms, dissections or connective tissue disorders.  He does not smoke or consume alcohol.  He is here for a surgical opinion.      Patient Active Problem List   Diagnosis   • Acute on chronic blood loss anemia   • Essential hypertension   • Hyperlipidemia   • Chronic GERD   • CVA (cerebral vascular accident) (CMS/HCC)   • Hypertrophy of prostate without urinary obstruction and other lower urinary tract symptoms (LUTS)   • Iron deficiency anemia   • Pure hypercholesterolemia   • Solitary pulmonary nodule   • Shortness of breath   • Chest discomfort   • Aortic stenosis, severe       Past Medical History:   Diagnosis Date   • Anemia    • GERD  (gastroesophageal reflux disease)    • Hypertension    • Shortness of breath 4/19/2021   • Stroke (CMS/HCC) 2011       Past Surgical History:   Procedure Laterality Date   • CARDIAC CATHETERIZATION N/A 4/27/2021    Procedure: Left Heart Cath;  Surgeon: Darian Fajardo MD;  Location: [facility] CATH INVASIVE LOCATION;  Service: Cardiovascular;  Laterality: N/A;   • CARDIAC CATHETERIZATION N/A 4/27/2021    Procedure: Right Heart Cath;  Surgeon: Darian Fajardo MD;  Location: [facility] CATH INVASIVE LOCATION;  Service: Cardiovascular;  Laterality: N/A;   • CARDIAC SURGERY     • COLONOSCOPY         Allergies   Allergen Reactions   • Sulfa Antibiotics Other (See Comments)     Pt unable to specify         Current Outpatient Medications:   •  albuterol sulfate  (90 Base) MCG/ACT inhaler, Inhale 2 puffs Every 4 (Four) Hours As Needed., Disp: , Rfl:   •  lisinopril (PRINIVIL,ZESTRIL) 20 MG tablet, Take 1 tablet by mouth Daily., Disp: 90 tablet, Rfl: 1  •  pantoprazole (PROTONIX) 40 MG EC tablet, Take 40 mg by mouth Daily., Disp: , Rfl:   •  simvastatin (ZOCOR) 40 MG tablet, Take 40 mg by mouth Every Night., Disp: , Rfl:   •  terazosin (HYTRIN) 5 MG capsule, Take 5 mg by mouth Every Night., Disp: , Rfl:     Social History     Socioeconomic History   • Marital status: Single     Spouse name: Not on file   • Number of children: Not on file   • Years of education: Not on file   • Highest education level: Not on file   Tobacco Use   • Smoking status: Former Smoker   • Smokeless tobacco: Never Used   Vaping Use   • Vaping Use: Never used   Substance and Sexual Activity   • Alcohol use: No   • Drug use: No   • Sexual activity: Defer       Family History   Problem Relation Age of Onset   • Heart attack Mother    • Heart failure Mother      Review of Systems   Constitutional: Positive for fatigue. Negative for fever.   Respiratory: Positive for chest tightness and shortness of breath. Negative for wheezing.    Cardiovascular: Negative  for palpitations and leg swelling.   Neurological: Negative for dizziness and weakness.   All other systems reviewed and are negative.      Physical Exam:    Vital Signs:  Weight: 78.7 kg (173 lb 8 oz)   Body mass index is 26.38 kg/m².  Temp: 98 °F (36.7 °C)   Heart Rate: 67   BP: 163/53     Constitutional:       Appearance: Well-developed.   Eyes:      Conjunctiva/sclera: Conjunctivae normal.      Pupils: Pupils are equal, round, and reactive to light.   HENT:      Head: Normocephalic and atraumatic.      Nose: Nose normal.   Neck:      Thyroid: No thyromegaly.      Vascular: No JVD.      Lymphadenopathy: No cervical adenopathy.   Pulmonary:      Effort: Pulmonary effort is normal.      Breath sounds: Normal breath sounds. No rales.   Cardiovascular:      PMI at left midclavicular line. Normal rate. Regular rhythm.      Murmurs: There is a grade 3/6 harsh midsystolic murmur at the URSB, radiating to the neck.      No gallop.   Pulses:     No decreased pulses.      Carotid: 3+ bilaterally.     Radial: 3+ bilaterally.     Posterior tibial: 3+ bilaterally.  Edema:     Peripheral edema absent.   Abdominal:      General: Bowel sounds are normal. There is no distension.      Palpations: Abdomen is soft. There is no abdominal mass.      Tenderness: There is no abdominal tenderness.   Musculoskeletal: Normal range of motion.         General: No tenderness or deformity.      Cervical back: Normal range of motion and neck supple. Skin:     General: Skin is warm and dry.      Findings: No erythema or rash.   Neurological:      Mental Status: Alert and oriented to person, place, and time.      Deep Tendon Reflexes: Reflexes are normal and symmetric.   Psychiatric:         Behavior: Behavior normal.          Assessment:     Problems Addressed this Visit        Cardiac and Vasculature    Essential hypertension - Primary    Hyperlipidemia    Pure hypercholesterolemia    Chest discomfort    Aortic stenosis, severe        Gastrointestinal Abdominal     Chronic GERD       Neuro    CVA (cerebral vascular accident) (CMS/HCC) (Chronic)       Pulmonary and Pneumonias    Solitary pulmonary nodule    Shortness of breath      Diagnoses       Codes Comments    Essential hypertension    -  Primary ICD-10-CM: I10  ICD-9-CM: 401.9     Aortic stenosis, severe     ICD-10-CM: I35.0  ICD-9-CM: 424.1     Chest discomfort     ICD-10-CM: R07.89  ICD-9-CM: 786.59     Hyperlipidemia, unspecified hyperlipidemia type     ICD-10-CM: E78.5  ICD-9-CM: 272.4     Pure hypercholesterolemia     ICD-10-CM: E78.00  ICD-9-CM: 272.0     Chronic GERD     ICD-10-CM: K21.9  ICD-9-CM: 530.81     Solitary pulmonary nodule     ICD-10-CM: R91.1  ICD-9-CM: 793.11     Cerebrovascular accident (CVA), unspecified mechanism (CMS/HCC)     ICD-10-CM: I63.9  ICD-9-CM: 434.91     Shortness of breath     ICD-10-CM: R06.02  ICD-9-CM: 786.05           1. Severe symptomatic aortic stenosis  2. Hypertension, well controlled  3. Dyspnea of exertion  4. Solitary pulmonary nodule, unchanged    Recommendation/Plan:     1. He has severe symptomatic nonrheumatic aortic stenosis with progression of symptoms.  He has heart failure symptoms class II/III and occasional chest discomfort.  He has no active coronary artery disease.  He is physiologically younger for his age.  Patient states that the stroke he had was mild with full recovery.  Unclear the etiology.  I recommend surgical aortic valve replacement for symptomatic relief and prolong survival.  His STS risk for open surgery less than 1%.  I discussed the risk, benefits and terms of surgery and he wishes to proceed.  I discussed the minimally invasive mini sternotomy operation with him and the use of biologic prosthesis due to his age.  2. Hypertension, is well controlled continue same regimen  3. Unclear whether he had a stroke or a TIA.  Will review further studies and may need to perform a CT scan of the head for further  evaluation  4. Solitary lung nodules, no evidence of enlargement but he may need a repeat CT of the chest    Thank you for allowing me to participate in his care.    Regards,    Nael Padilla M.D.

## 2021-05-10 ENCOUNTER — TELEPHONE (OUTPATIENT)
Dept: CARDIOLOGY | Facility: CLINIC | Age: 76
End: 2021-05-10

## 2021-05-14 ENCOUNTER — HOSPITAL ENCOUNTER (OUTPATIENT)
Dept: CT IMAGING | Facility: HOSPITAL | Age: 76
Discharge: HOME | End: 2021-05-14
Admitting: THORACIC SURGERY (CARDIOTHORACIC VASCULAR SURGERY)

## 2021-05-14 DIAGNOSIS — I63.9 CEREBROVASCULAR ACCIDENT (CVA), UNSPECIFIED MECHANISM (HCC): Chronic | ICD-10-CM

## 2021-05-14 DIAGNOSIS — R06.02 SHORTNESS OF BREATH: ICD-10-CM

## 2021-05-14 DIAGNOSIS — R91.1 SOLITARY PULMONARY NODULE: ICD-10-CM

## 2021-05-14 DIAGNOSIS — I35.0 AORTIC STENOSIS, SEVERE: ICD-10-CM

## 2021-05-14 PROCEDURE — 70450 CT HEAD/BRAIN W/O DYE: CPT

## 2021-05-14 PROCEDURE — 71250 CT THORAX DX C-: CPT

## 2021-05-21 ENCOUNTER — TELEPHONE (OUTPATIENT)
Dept: CARDIAC SURGERY | Facility: CLINIC | Age: 76
End: 2021-05-21

## 2021-05-21 NOTE — TELEPHONE ENCOUNTER
After speaking with Dr Padilla I called patient to let him know we have ordered PFT's and room air ABG's which is agreeable to the patient

## 2021-05-27 ENCOUNTER — TELEPHONE (OUTPATIENT)
Dept: CARDIAC SURGERY | Facility: CLINIC | Age: 76
End: 2021-05-27

## 2021-05-27 NOTE — TELEPHONE ENCOUNTER
Spoke with , advised per  pt needs PFT and Room Air ABG. Domingo RN discussed recommendations with  on 5/21,  wanting to know when testing is scheduled advised scheduling dept would be in contact with him to schedule also gave scheduling number for [facility]. Pt states he will call scheduling to set up testing, advised should he have any further questions or concerns to please call office, he verbalized understanding and this was agreeable.       ----- Message from Jared Chappell sent at 5/27/2021  8:44 AM EDT -----  Regarding: RE: surgery Michael  Mr. Manuel called wanting to know where he is on the rest of the testing that needs to be done. Could you please touch base with him.

## 2021-06-01 ENCOUNTER — TELEPHONE (OUTPATIENT)
Dept: CARDIAC SURGERY | Facility: CLINIC | Age: 76
End: 2021-06-01

## 2021-06-01 NOTE — TELEPHONE ENCOUNTER
----- Message from Naa Archuleta sent at 6/1/2021 11:00 AM EDT -----  PT WOULD LIKE YOU TO CALL HIM BACK PLEASE    [phone]       THANKS

## 2021-06-01 NOTE — TELEPHONE ENCOUNTER
Pt called to notify he has scheduled pft/abg on 6/15 at [facility], [place], operated by [facility]

## 2021-06-02 ENCOUNTER — TRANSCRIBE ORDERS (OUTPATIENT)
Dept: ADMINISTRATIVE | Facility: HOSPITAL | Age: 76
End: 2021-06-02

## 2021-06-02 DIAGNOSIS — Z01.818 OTHER SPECIFIED PRE-OPERATIVE EXAMINATION: Primary | ICD-10-CM

## 2021-06-12 ENCOUNTER — APPOINTMENT (OUTPATIENT)
Dept: LAB | Facility: HOSPITAL | Age: 76
End: 2021-06-12

## 2021-06-15 ENCOUNTER — APPOINTMENT (OUTPATIENT)
Dept: RESPIRATORY THERAPY | Facility: HOSPITAL | Age: 76
End: 2021-06-15

## 2021-06-19 ENCOUNTER — LAB (OUTPATIENT)
Dept: LAB | Facility: HOSPITAL | Age: 76
End: 2021-06-19

## 2021-06-19 DIAGNOSIS — Z01.818 OTHER SPECIFIED PRE-OPERATIVE EXAMINATION: ICD-10-CM

## 2021-06-19 LAB — SARS-COV-2 ORF1AB RESP QL NAA+PROBE: NOT DETECTED

## 2021-06-19 PROCEDURE — U0004 COV-19 TEST NON-CDC HGH THRU: HCPCS

## 2021-06-19 PROCEDURE — C9803 HOPD COVID-19 SPEC COLLECT: HCPCS

## 2021-06-22 ENCOUNTER — HOSPITAL ENCOUNTER (OUTPATIENT)
Dept: RESPIRATORY THERAPY | Facility: HOSPITAL | Age: 76
Discharge: HOME | End: 2021-06-22
Admitting: THORACIC SURGERY (CARDIOTHORACIC VASCULAR SURGERY)

## 2021-06-22 VITALS — HEART RATE: 70 BPM | RESPIRATION RATE: 16 BRPM

## 2021-06-22 DIAGNOSIS — Z01.818 PREOP TESTING: ICD-10-CM

## 2021-06-22 DIAGNOSIS — R91.1 SOLITARY PULMONARY NODULE: ICD-10-CM

## 2021-06-22 DIAGNOSIS — R06.02 SHORTNESS OF BREATH: ICD-10-CM

## 2021-06-22 DIAGNOSIS — I35.0 AORTIC STENOSIS, SEVERE: ICD-10-CM

## 2021-06-22 DIAGNOSIS — I10 ESSENTIAL HYPERTENSION: ICD-10-CM

## 2021-06-22 LAB
ARTERIAL PATENCY WRIST A: ABNORMAL
ATMOSPHERIC PRESS: ABNORMAL MM[HG]
BASE EXCESS BLDA CALC-SCNC: -4.4 MMOL/L (ref 0–3)
BDY SITE: ABNORMAL
CO2 BLDA-SCNC: 20.6 MMOL/L (ref 22–29)
HCO3 BLDA-SCNC: 19.7 MMOL/L (ref 21–28)
HEMODILUTION: NO
INHALED O2 CONCENTRATION: 21 %
MODALITY: ABNORMAL
PCO2 BLDA: 31.1 MM HG (ref 35–48)
PH BLDA: 7.41 PH UNITS (ref 7.35–7.45)
PO2 BLDA: 73.5 MM HG (ref 83–108)
SAO2 % BLDCOA: 94.9 % (ref 94–98)

## 2021-06-22 PROCEDURE — A9270 NON-COVERED ITEM OR SERVICE: HCPCS | Performed by: THORACIC SURGERY (CARDIOTHORACIC VASCULAR SURGERY)

## 2021-06-22 PROCEDURE — 94060 EVALUATION OF WHEEZING: CPT

## 2021-06-22 PROCEDURE — 94727 GAS DIL/WSHOT DETER LNG VOL: CPT

## 2021-06-22 PROCEDURE — 36600 WITHDRAWAL OF ARTERIAL BLOOD: CPT

## 2021-06-22 PROCEDURE — 63710000001 ALBUTEROL SULFATE HFA 108 (90 BASE) MCG/ACT AEROSOL SOLUTION 6.7 G INHALER: Performed by: THORACIC SURGERY (CARDIOTHORACIC VASCULAR SURGERY)

## 2021-06-22 PROCEDURE — 94729 DIFFUSING CAPACITY: CPT

## 2021-06-22 PROCEDURE — 82803 BLOOD GASES ANY COMBINATION: CPT

## 2021-06-22 RX ORDER — ALBUTEROL SULFATE 90 UG/1
2 AEROSOL, METERED RESPIRATORY (INHALATION) ONCE
Status: COMPLETED | OUTPATIENT
Start: 2021-06-22 | End: 2021-06-22

## 2021-06-22 RX ADMIN — ALBUTEROL SULFATE 2 PUFF: 108 AEROSOL, METERED RESPIRATORY (INHALATION) at 10:47

## 2021-06-30 ENCOUNTER — PREP FOR SURGERY (OUTPATIENT)
Dept: OTHER | Facility: HOSPITAL | Age: 76
End: 2021-06-30

## 2021-06-30 DIAGNOSIS — R79.1 ABNORMAL COAGULATION PROFILE: ICD-10-CM

## 2021-06-30 DIAGNOSIS — Z01.810 PRE-OPERATIVE CARDIOVASCULAR EXAMINATION, HIGH RISK SURGERY: ICD-10-CM

## 2021-06-30 DIAGNOSIS — R09.89 OTHER SPECIFIED SYMPTOMS AND SIGNS INVOLVING THE CIRCULATORY AND RESPIRATORY SYSTEMS: ICD-10-CM

## 2021-06-30 DIAGNOSIS — I35.0 AORTIC STENOSIS: Primary | ICD-10-CM

## 2021-06-30 DIAGNOSIS — R79.9 ABNORMAL FINDING OF BLOOD CHEMISTRY, UNSPECIFIED: ICD-10-CM

## 2021-06-30 RX ORDER — SODIUM CHLORIDE 0.9 % (FLUSH) 0.9 %
3 SYRINGE (ML) INJECTION EVERY 12 HOURS SCHEDULED
Status: CANCELLED | OUTPATIENT
Start: 2021-06-30

## 2021-06-30 RX ORDER — SODIUM CHLORIDE 9 MG/ML
30 INJECTION, SOLUTION INTRAVENOUS CONTINUOUS PRN
Status: CANCELLED | OUTPATIENT
Start: 2021-06-30

## 2021-06-30 RX ORDER — CHLORHEXIDINE GLUCONATE 0.12 MG/ML
15 RINSE ORAL EVERY 12 HOURS
Status: CANCELLED | OUTPATIENT
Start: 2021-06-30 | End: 2021-07-01

## 2021-06-30 RX ORDER — ALPRAZOLAM 0.25 MG/1
0.25 TABLET ORAL ONCE
Status: CANCELLED | OUTPATIENT
Start: 2021-06-30 | End: 2021-06-30

## 2021-06-30 RX ORDER — CHLORHEXIDINE GLUCONATE 500 MG/1
1 CLOTH TOPICAL EVERY 12 HOURS PRN
Status: CANCELLED | OUTPATIENT
Start: 2021-06-30

## 2021-06-30 RX ORDER — SODIUM CHLORIDE 0.9 % (FLUSH) 0.9 %
3-10 SYRINGE (ML) INJECTION AS NEEDED
Status: CANCELLED | OUTPATIENT
Start: 2021-06-30

## 2021-06-30 RX ORDER — CHLORHEXIDINE GLUCONATE 0.12 MG/ML
15 RINSE ORAL ONCE
Status: CANCELLED | OUTPATIENT
Start: 2021-06-30 | End: 2021-06-30

## 2021-06-30 RX ORDER — SODIUM CHLORIDE 0.9 % (FLUSH) 0.9 %
30 SYRINGE (ML) INJECTION ONCE AS NEEDED
Status: CANCELLED | OUTPATIENT
Start: 2021-06-30

## 2021-06-30 RX ORDER — NITROGLYCERIN 0.4 MG/1
0.4 TABLET SUBLINGUAL
Status: CANCELLED | OUTPATIENT
Start: 2021-06-30

## 2021-06-30 RX ORDER — ACETAMINOPHEN 325 MG/1
650 TABLET ORAL EVERY 4 HOURS PRN
Status: CANCELLED | OUTPATIENT
Start: 2021-06-30

## 2021-07-07 ENCOUNTER — HOSPITAL ENCOUNTER (OUTPATIENT)
Facility: HOSPITAL | Age: 76
Setting detail: OBSERVATION
End: 2021-07-07
Attending: HOSPITALIST | Admitting: HOSPITALIST

## 2021-07-08 ENCOUNTER — HOSPITAL ENCOUNTER (OUTPATIENT)
Dept: CARDIOLOGY | Facility: HOSPITAL | Age: 76
Discharge: HOME | End: 2021-07-08
Admitting: NURSE PRACTITIONER

## 2021-07-08 DIAGNOSIS — Z01.810 PRE-OPERATIVE CARDIOVASCULAR EXAMINATION, HIGH RISK SURGERY: ICD-10-CM

## 2021-07-08 DIAGNOSIS — R09.89 OTHER SPECIFIED SYMPTOMS AND SIGNS INVOLVING THE CIRCULATORY AND RESPIRATORY SYSTEMS: ICD-10-CM

## 2021-07-08 DIAGNOSIS — I35.0 AORTIC STENOSIS: ICD-10-CM

## 2021-07-08 LAB
BH CV XLRA MEAS LEFT CCA RATIO VEL: 116 CM/SEC
BH CV XLRA MEAS LEFT DIST CCA EDV: 10.2 CM/SEC
BH CV XLRA MEAS LEFT DIST CCA PSV: 94.9 CM/SEC
BH CV XLRA MEAS LEFT DIST ICA EDV: -19.1 CM/SEC
BH CV XLRA MEAS LEFT DIST ICA PSV: -102 CM/SEC
BH CV XLRA MEAS LEFT ICA RATIO VEL: 237 CM/SEC
BH CV XLRA MEAS LEFT ICA/CCA RATIO: 2
BH CV XLRA MEAS LEFT MID ICA EDV: 30 CM/SEC
BH CV XLRA MEAS LEFT MID ICA PSV: 176 CM/SEC
BH CV XLRA MEAS LEFT PROX CCA EDV: 14.7 CM/SEC
BH CV XLRA MEAS LEFT PROX CCA PSV: 116 CM/SEC
BH CV XLRA MEAS LEFT PROX ECA PSV: -98.8 CM/SEC
BH CV XLRA MEAS LEFT PROX ICA EDV: 48 CM/SEC
BH CV XLRA MEAS LEFT PROX ICA PSV: 237 CM/SEC
BH CV XLRA MEAS LEFT PROX SCLA PSV: 135 CM/SEC
BH CV XLRA MEAS LEFT VERTEBRAL A EDV: 8.7 CM/SEC
BH CV XLRA MEAS LEFT VERTEBRAL A PSV: 67.6 CM/SEC
BH CV XLRA MEAS RIGHT CCA RATIO VEL: 104 CM/SEC
BH CV XLRA MEAS RIGHT DIST CCA EDV: 11.2 CM/SEC
BH CV XLRA MEAS RIGHT DIST CCA PSV: 85.7 CM/SEC
BH CV XLRA MEAS RIGHT DIST ICA EDV: -14.9 CM/SEC
BH CV XLRA MEAS RIGHT DIST ICA PSV: -85.5 CM/SEC
BH CV XLRA MEAS RIGHT ICA RATIO VEL: -251 CM/SEC
BH CV XLRA MEAS RIGHT ICA/CCA RATIO: -2.4
BH CV XLRA MEAS RIGHT MID ICA EDV: 24 CM/SEC
BH CV XLRA MEAS RIGHT MID ICA PSV: 205 CM/SEC
BH CV XLRA MEAS RIGHT PROX CCA EDV: 9.3 CM/SEC
BH CV XLRA MEAS RIGHT PROX CCA PSV: 104 CM/SEC
BH CV XLRA MEAS RIGHT PROX ECA PSV: -110 CM/SEC
BH CV XLRA MEAS RIGHT PROX ICA EDV: -35.4 CM/SEC
BH CV XLRA MEAS RIGHT PROX ICA PSV: -251 CM/SEC
BH CV XLRA MEAS RIGHT PROX SCLA PSV: 188 CM/SEC
BH CV XLRA MEAS RIGHT VERTEBRAL A EDV: -11.2 CM/SEC
BH CV XLRA MEAS RIGHT VERTEBRAL A PSV: -70.8 CM/SEC
LEFT ARM BP: NORMAL MMHG
MAXIMAL PREDICTED HEART RATE: 143 BPM
RIGHT ARM BP: NORMAL MMHG
STRESS TARGET HR: 122 BPM

## 2021-07-08 PROCEDURE — 93880 EXTRACRANIAL BILAT STUDY: CPT

## 2021-07-12 ENCOUNTER — TELEPHONE (OUTPATIENT)
Dept: CARDIAC SURGERY | Facility: CLINIC | Age: 76
End: 2021-07-12

## 2021-07-12 NOTE — TELEPHONE ENCOUNTER
Spoke to patient with PAT and surgery times. PAT is on 8-3-2021 at 1100 am with all testing to follow.  Surgery is scheduled for  at 0730 with an 0500 arrival time.  He expressed a verbal understanding of these instructions.  He was instructed to call the office with any further questions.

## 2021-07-25 ENCOUNTER — HOSPITAL ENCOUNTER (INPATIENT)
Facility: HOSPITAL | Age: 76
LOS: 2 days | Discharge: LEFT BEFORE DISCHARGE | End: 2021-07-27
Attending: STUDENT IN AN ORGANIZED HEALTH CARE EDUCATION/TRAINING PROGRAM | Admitting: HOSPITALIST

## 2021-07-25 DIAGNOSIS — K92.2 GASTROINTESTINAL HEMORRHAGE, UNSPECIFIED GASTROINTESTINAL HEMORRHAGE TYPE: Primary | ICD-10-CM

## 2021-07-25 LAB
ABO GROUP BLD: NORMAL
ALBUMIN SERPL-MCNC: 4.5 G/DL (ref 3.5–5.2)
ALBUMIN/GLOB SERPL: 1.9 G/DL
ALP SERPL-CCNC: 36 U/L (ref 39–117)
ALT SERPL W P-5'-P-CCNC: <5 U/L (ref 1–41)
ANION GAP SERPL CALCULATED.3IONS-SCNC: 11 MMOL/L (ref 5–15)
AST SERPL-CCNC: 14 U/L (ref 1–40)
BASOPHILS # BLD AUTO: 0.1 10*3/MM3 (ref 0–0.2)
BASOPHILS NFR BLD AUTO: 1.1 % (ref 0–1.5)
BILIRUB SERPL-MCNC: 0.6 MG/DL (ref 0–1.2)
BLD GP AB SCN SERPL QL: NEGATIVE
BUN SERPL-MCNC: 27 MG/DL (ref 8–23)
BUN/CREAT SERPL: 16.7 (ref 7–25)
CALCIUM SPEC-SCNC: 9.1 MG/DL (ref 8.6–10.5)
CHLORIDE SERPL-SCNC: 100 MMOL/L (ref 98–107)
CO2 SERPL-SCNC: 24 MMOL/L (ref 22–29)
CREAT SERPL-MCNC: 1.62 MG/DL (ref 0.76–1.27)
DEPRECATED RDW RBC AUTO: 54.3 FL (ref 37–54)
EOSINOPHIL # BLD AUTO: 0.2 10*3/MM3 (ref 0–0.4)
EOSINOPHIL NFR BLD AUTO: 2.2 % (ref 0.3–6.2)
ERYTHROCYTE [DISTWIDTH] IN BLOOD BY AUTOMATED COUNT: 18.9 % (ref 12.3–15.4)
GFR SERPL CREATININE-BSD FRML MDRD: 42 ML/MIN/1.73
GLOBULIN UR ELPH-MCNC: 2.4 GM/DL
GLUCOSE SERPL-MCNC: 114 MG/DL (ref 65–99)
HCT VFR BLD AUTO: 29.4 % (ref 37.5–51)
HGB BLD-MCNC: 9.9 G/DL (ref 13–17.7)
LYMPHOCYTES # BLD AUTO: 1.3 10*3/MM3 (ref 0.7–3.1)
LYMPHOCYTES NFR BLD AUTO: 16.5 % (ref 19.6–45.3)
MCH RBC QN AUTO: 27.6 PG (ref 26.6–33)
MCHC RBC AUTO-ENTMCNC: 33.6 G/DL (ref 31.5–35.7)
MCV RBC AUTO: 82.3 FL (ref 79–97)
MONOCYTES # BLD AUTO: 0.7 10*3/MM3 (ref 0.1–0.9)
MONOCYTES NFR BLD AUTO: 8.9 % (ref 5–12)
NEUTROPHILS NFR BLD AUTO: 5.6 10*3/MM3 (ref 1.7–7)
NEUTROPHILS NFR BLD AUTO: 71.3 % (ref 42.7–76)
NRBC BLD AUTO-RTO: 0.4 /100 WBC (ref 0–0.2)
NT-PROBNP SERPL-MCNC: 3230 PG/ML (ref 0–1800)
PLATELET # BLD AUTO: 256 10*3/MM3 (ref 140–450)
PMV BLD AUTO: 7.5 FL (ref 6–12)
POTASSIUM SERPL-SCNC: 4.6 MMOL/L (ref 3.5–5.2)
PROT SERPL-MCNC: 6.9 G/DL (ref 6–8.5)
RBC # BLD AUTO: 3.57 10*6/MM3 (ref 4.14–5.8)
RH BLD: NEGATIVE
SODIUM SERPL-SCNC: 135 MMOL/L (ref 136–145)
T&S EXPIRATION DATE: NORMAL
WBC # BLD AUTO: 7.8 10*3/MM3 (ref 3.4–10.8)

## 2021-07-25 PROCEDURE — 80053 COMPREHEN METABOLIC PANEL: CPT | Performed by: STUDENT IN AN ORGANIZED HEALTH CARE EDUCATION/TRAINING PROGRAM

## 2021-07-25 PROCEDURE — 86900 BLOOD TYPING SEROLOGIC ABO: CPT | Performed by: STUDENT IN AN ORGANIZED HEALTH CARE EDUCATION/TRAINING PROGRAM

## 2021-07-25 PROCEDURE — 85025 COMPLETE CBC W/AUTO DIFF WBC: CPT | Performed by: STUDENT IN AN ORGANIZED HEALTH CARE EDUCATION/TRAINING PROGRAM

## 2021-07-25 PROCEDURE — 99223 1ST HOSP IP/OBS HIGH 75: CPT | Performed by: STUDENT IN AN ORGANIZED HEALTH CARE EDUCATION/TRAINING PROGRAM

## 2021-07-25 PROCEDURE — 83880 ASSAY OF NATRIURETIC PEPTIDE: CPT | Performed by: STUDENT IN AN ORGANIZED HEALTH CARE EDUCATION/TRAINING PROGRAM

## 2021-07-25 PROCEDURE — 86901 BLOOD TYPING SEROLOGIC RH(D): CPT | Performed by: STUDENT IN AN ORGANIZED HEALTH CARE EDUCATION/TRAINING PROGRAM

## 2021-07-25 PROCEDURE — 86850 RBC ANTIBODY SCREEN: CPT | Performed by: STUDENT IN AN ORGANIZED HEALTH CARE EDUCATION/TRAINING PROGRAM

## 2021-07-25 PROCEDURE — U0004 COV-19 TEST NON-CDC HGH THRU: HCPCS | Performed by: STUDENT IN AN ORGANIZED HEALTH CARE EDUCATION/TRAINING PROGRAM

## 2021-07-25 RX ORDER — NITROGLYCERIN 0.4 MG/1
0.4 TABLET SUBLINGUAL
Status: DISCONTINUED | OUTPATIENT
Start: 2021-07-25 | End: 2021-07-27 | Stop reason: HOSPADM

## 2021-07-25 RX ORDER — SODIUM CHLORIDE 0.9 % (FLUSH) 0.9 %
10 SYRINGE (ML) INJECTION EVERY 12 HOURS SCHEDULED
Status: DISCONTINUED | OUTPATIENT
Start: 2021-07-25 | End: 2021-07-27 | Stop reason: HOSPADM

## 2021-07-25 RX ORDER — ALBUTEROL SULFATE 2.5 MG/3ML
2.5 SOLUTION RESPIRATORY (INHALATION) EVERY 6 HOURS PRN
Status: DISCONTINUED | OUTPATIENT
Start: 2021-07-25 | End: 2021-07-27 | Stop reason: HOSPADM

## 2021-07-25 RX ORDER — LABETALOL HYDROCHLORIDE 5 MG/ML
10 INJECTION, SOLUTION INTRAVENOUS ONCE
Status: DISCONTINUED | OUTPATIENT
Start: 2021-07-25 | End: 2021-07-25

## 2021-07-25 RX ORDER — ONDANSETRON 4 MG/1
4 TABLET, FILM COATED ORAL EVERY 6 HOURS PRN
Status: DISCONTINUED | OUTPATIENT
Start: 2021-07-25 | End: 2021-07-27 | Stop reason: HOSPADM

## 2021-07-25 RX ORDER — TERAZOSIN 5 MG/1
5 CAPSULE ORAL NIGHTLY
Status: DISCONTINUED | OUTPATIENT
Start: 2021-07-25 | End: 2021-07-27 | Stop reason: HOSPADM

## 2021-07-25 RX ORDER — LISINOPRIL 20 MG/1
20 TABLET ORAL DAILY
Status: DISCONTINUED | OUTPATIENT
Start: 2021-07-26 | End: 2021-07-27 | Stop reason: HOSPADM

## 2021-07-25 RX ORDER — ONDANSETRON 2 MG/ML
4 INJECTION INTRAMUSCULAR; INTRAVENOUS EVERY 6 HOURS PRN
Status: DISCONTINUED | OUTPATIENT
Start: 2021-07-25 | End: 2021-07-27 | Stop reason: HOSPADM

## 2021-07-25 RX ORDER — LABETALOL HYDROCHLORIDE 5 MG/ML
10 INJECTION, SOLUTION INTRAVENOUS
Status: DISCONTINUED | OUTPATIENT
Start: 2021-07-25 | End: 2021-07-27 | Stop reason: HOSPADM

## 2021-07-25 RX ORDER — PANTOPRAZOLE SODIUM 40 MG/10ML
40 INJECTION, POWDER, LYOPHILIZED, FOR SOLUTION INTRAVENOUS 2 TIMES DAILY
Status: DISCONTINUED | OUTPATIENT
Start: 2021-07-25 | End: 2021-07-26

## 2021-07-25 RX ORDER — ATORVASTATIN CALCIUM 20 MG/1
20 TABLET, FILM COATED ORAL DAILY
Status: DISCONTINUED | OUTPATIENT
Start: 2021-07-26 | End: 2021-07-27 | Stop reason: HOSPADM

## 2021-07-25 RX ORDER — SODIUM CHLORIDE 0.9 % (FLUSH) 0.9 %
10 SYRINGE (ML) INJECTION AS NEEDED
Status: DISCONTINUED | OUTPATIENT
Start: 2021-07-25 | End: 2021-07-27 | Stop reason: HOSPADM

## 2021-07-25 RX ADMIN — PANTOPRAZOLE SODIUM 40 MG: 40 INJECTION, POWDER, FOR SOLUTION INTRAVENOUS at 20:51

## 2021-07-25 RX ADMIN — TERAZOSIN HYDROCHLORIDE 5 MG: 5 CAPSULE ORAL at 20:50

## 2021-07-25 RX ADMIN — Medication 10 ML: at 20:50

## 2021-07-26 ENCOUNTER — ANESTHESIA (OUTPATIENT)
Dept: GASTROENTEROLOGY | Facility: HOSPITAL | Age: 76
End: 2021-07-26

## 2021-07-26 ENCOUNTER — ANESTHESIA EVENT (OUTPATIENT)
Dept: GASTROENTEROLOGY | Facility: HOSPITAL | Age: 76
End: 2021-07-26

## 2021-07-26 ENCOUNTER — INPATIENT HOSPITAL (AMBULATORY)
Dept: URBAN - METROPOLITAN AREA HOSPITAL 84 | Facility: HOSPITAL | Age: 76
End: 2021-07-26
Payer: COMMERCIAL

## 2021-07-26 DIAGNOSIS — K22.70 BARRETT'S ESOPHAGUS WITHOUT DYSPLASIA: ICD-10-CM

## 2021-07-26 DIAGNOSIS — K92.1 MELENA: ICD-10-CM

## 2021-07-26 DIAGNOSIS — K31.7 POLYP OF STOMACH AND DUODENUM: ICD-10-CM

## 2021-07-26 DIAGNOSIS — K44.9 DIAPHRAGMATIC HERNIA WITHOUT OBSTRUCTION OR GANGRENE: ICD-10-CM

## 2021-07-26 DIAGNOSIS — D50.0 IRON DEFICIENCY ANEMIA SECONDARY TO BLOOD LOSS (CHRONIC): ICD-10-CM

## 2021-07-26 LAB
ANION GAP SERPL CALCULATED.3IONS-SCNC: 11 MMOL/L (ref 5–15)
BUN SERPL-MCNC: 23 MG/DL (ref 8–23)
BUN/CREAT SERPL: 14.6 (ref 7–25)
CALCIUM SPEC-SCNC: 8.6 MG/DL (ref 8.6–10.5)
CHLORIDE SERPL-SCNC: 100 MMOL/L (ref 98–107)
CO2 SERPL-SCNC: 23 MMOL/L (ref 22–29)
CREAT SERPL-MCNC: 1.57 MG/DL (ref 0.76–1.27)
DEPRECATED RDW RBC AUTO: 53.4 FL (ref 37–54)
ERYTHROCYTE [DISTWIDTH] IN BLOOD BY AUTOMATED COUNT: 18.9 % (ref 12.3–15.4)
FOLATE SERPL-MCNC: 19.7 NG/ML (ref 4.78–24.2)
GFR SERPL CREATININE-BSD FRML MDRD: 43 ML/MIN/1.73
GLUCOSE SERPL-MCNC: 94 MG/DL (ref 65–99)
HCT VFR BLD AUTO: 27.2 % (ref 37.5–51)
HGB BLD-MCNC: 9.2 G/DL (ref 13–17.7)
IRON 24H UR-MRATE: 55 MCG/DL (ref 59–158)
IRON SATN MFR SERPL: 14 % (ref 20–50)
MCH RBC QN AUTO: 27.8 PG (ref 26.6–33)
MCHC RBC AUTO-ENTMCNC: 33.6 G/DL (ref 31.5–35.7)
MCV RBC AUTO: 82.6 FL (ref 79–97)
PLATELET # BLD AUTO: 243 10*3/MM3 (ref 140–450)
PMV BLD AUTO: 7.5 FL (ref 6–12)
POTASSIUM SERPL-SCNC: 4 MMOL/L (ref 3.5–5.2)
RBC # BLD AUTO: 3.3 10*6/MM3 (ref 4.14–5.8)
SARS-COV-2 ORF1AB RESP QL NAA+PROBE: NOT DETECTED
SARS-COV-2 RNA PNL SPEC NAA+PROBE: NOT DETECTED
SODIUM SERPL-SCNC: 134 MMOL/L (ref 136–145)
TIBC SERPL-MCNC: 381 MCG/DL (ref 298–536)
TRANSFERRIN SERPL-MCNC: 256 MG/DL (ref 200–360)
WBC # BLD AUTO: 6.6 10*3/MM3 (ref 3.4–10.8)

## 2021-07-26 PROCEDURE — 85027 COMPLETE CBC AUTOMATED: CPT | Performed by: STUDENT IN AN ORGANIZED HEALTH CARE EDUCATION/TRAINING PROGRAM

## 2021-07-26 PROCEDURE — 99222 1ST HOSP IP/OBS MODERATE 55: CPT | Performed by: INTERNAL MEDICINE

## 2021-07-26 PROCEDURE — 0DJ08ZZ INSPECTION OF UPPER INTESTINAL TRACT, VIA NATURAL OR ARTIFICIAL OPENING ENDOSCOPIC: ICD-10-PCS | Performed by: INTERNAL MEDICINE

## 2021-07-26 PROCEDURE — 82746 ASSAY OF FOLIC ACID SERUM: CPT | Performed by: NURSE PRACTITIONER

## 2021-07-26 PROCEDURE — 87635 SARS-COV-2 COVID-19 AMP PRB: CPT | Performed by: NURSE PRACTITIONER

## 2021-07-26 PROCEDURE — 84466 ASSAY OF TRANSFERRIN: CPT | Performed by: NURSE PRACTITIONER

## 2021-07-26 PROCEDURE — 83540 ASSAY OF IRON: CPT | Performed by: NURSE PRACTITIONER

## 2021-07-26 PROCEDURE — 43235 EGD DIAGNOSTIC BRUSH WASH: CPT | Performed by: INTERNAL MEDICINE

## 2021-07-26 PROCEDURE — 80048 BASIC METABOLIC PNL TOTAL CA: CPT | Performed by: STUDENT IN AN ORGANIZED HEALTH CARE EDUCATION/TRAINING PROGRAM

## 2021-07-26 PROCEDURE — 99232 SBSQ HOSP IP/OBS MODERATE 35: CPT | Performed by: HOSPITALIST

## 2021-07-26 PROCEDURE — 25010000002 PROPOFOL 10 MG/ML EMULSION: Performed by: ANESTHESIOLOGY

## 2021-07-26 PROCEDURE — 99024 POSTOP FOLLOW-UP VISIT: CPT | Performed by: NURSE PRACTITIONER

## 2021-07-26 RX ORDER — ACETAMINOPHEN 325 MG/1
650 TABLET ORAL EVERY 6 HOURS PRN
Status: DISCONTINUED | OUTPATIENT
Start: 2021-07-26 | End: 2021-07-27 | Stop reason: HOSPADM

## 2021-07-26 RX ORDER — PHENYLEPHRINE HCL IN 0.9% NACL 1 MG/10 ML
SYRINGE (ML) INTRAVENOUS AS NEEDED
Status: DISCONTINUED | OUTPATIENT
Start: 2021-07-26 | End: 2021-07-26 | Stop reason: SURG

## 2021-07-26 RX ORDER — SODIUM CHLORIDE 0.9 % (FLUSH) 0.9 %
3-10 SYRINGE (ML) INJECTION AS NEEDED
Status: DISCONTINUED | OUTPATIENT
Start: 2021-07-26 | End: 2021-07-27 | Stop reason: HOSPADM

## 2021-07-26 RX ORDER — PEG-3350, SODIUM SULFATE, SODIUM CHLORIDE, POTASSIUM CHLORIDE, SODIUM ASCORBATE AND ASCORBIC ACID 7.5-2.691G
1000 KIT ORAL EVERY 12 HOURS
Status: COMPLETED | OUTPATIENT
Start: 2021-07-26 | End: 2021-07-26

## 2021-07-26 RX ORDER — EPINEPHRINE 0.1 MG/ML
SYRINGE (ML) INJECTION
Status: DISCONTINUED
Start: 2021-07-26 | End: 2021-07-26 | Stop reason: WASHOUT

## 2021-07-26 RX ORDER — SODIUM CHLORIDE 0.9 % (FLUSH) 0.9 %
3 SYRINGE (ML) INJECTION EVERY 12 HOURS SCHEDULED
Status: DISCONTINUED | OUTPATIENT
Start: 2021-07-26 | End: 2021-07-27 | Stop reason: HOSPADM

## 2021-07-26 RX ORDER — EPHEDRINE SULFATE 50 MG/ML
INJECTION INTRAVENOUS AS NEEDED
Status: DISCONTINUED | OUTPATIENT
Start: 2021-07-26 | End: 2021-07-26 | Stop reason: SURG

## 2021-07-26 RX ORDER — SODIUM CHLORIDE 9 MG/ML
INJECTION, SOLUTION INTRAVENOUS CONTINUOUS PRN
Status: DISCONTINUED | OUTPATIENT
Start: 2021-07-26 | End: 2021-07-26 | Stop reason: SURG

## 2021-07-26 RX ORDER — ALCOHOL 0.98 ML/ML
INJECTION INTRASPINAL
Status: DISCONTINUED
Start: 2021-07-26 | End: 2021-07-26 | Stop reason: WASHOUT

## 2021-07-26 RX ORDER — PANTOPRAZOLE SODIUM 40 MG/1
40 TABLET, DELAYED RELEASE ORAL
Status: DISCONTINUED | OUTPATIENT
Start: 2021-07-27 | End: 2021-07-27 | Stop reason: HOSPADM

## 2021-07-26 RX ORDER — SODIUM TETRADECYL SULFATE 30 MG/ML
INJECTION, SOLUTION INTRAVENOUS
Status: DISCONTINUED
Start: 2021-07-26 | End: 2021-07-26 | Stop reason: WASHOUT

## 2021-07-26 RX ORDER — PROPOFOL 10 MG/ML
VIAL (ML) INTRAVENOUS AS NEEDED
Status: DISCONTINUED | OUTPATIENT
Start: 2021-07-26 | End: 2021-07-26 | Stop reason: SURG

## 2021-07-26 RX ADMIN — Medication 10 ML: at 08:05

## 2021-07-26 RX ADMIN — Medication 200 MCG: at 09:24

## 2021-07-26 RX ADMIN — Medication 3 ML: at 20:42

## 2021-07-26 RX ADMIN — Medication 100 MCG: at 09:22

## 2021-07-26 RX ADMIN — ATORVASTATIN CALCIUM 20 MG: 20 TABLET, FILM COATED ORAL at 08:05

## 2021-07-26 RX ADMIN — EPHEDRINE SULFATE 20 MG: 50 INJECTION INTRAVENOUS at 09:26

## 2021-07-26 RX ADMIN — EPHEDRINE SULFATE 20 MG: 50 INJECTION INTRAVENOUS at 09:32

## 2021-07-26 RX ADMIN — EPHEDRINE SULFATE 20 MG: 50 INJECTION INTRAVENOUS at 09:29

## 2021-07-26 RX ADMIN — PANTOPRAZOLE SODIUM 40 MG: 40 INJECTION, POWDER, FOR SOLUTION INTRAVENOUS at 08:05

## 2021-07-26 RX ADMIN — LISINOPRIL 20 MG: 20 TABLET ORAL at 08:05

## 2021-07-26 RX ADMIN — TERAZOSIN HYDROCHLORIDE 5 MG: 5 CAPSULE ORAL at 20:41

## 2021-07-26 RX ADMIN — POLYETHYLENE GLYCOL 3350, SODIUM SULFATE, SODIUM CHLORIDE, POTASSIUM CHLORIDE, ASCORBIC ACID, SODIUM ASCORBATE 1000 ML: KIT at 11:12

## 2021-07-26 RX ADMIN — EPHEDRINE SULFATE 20 MG: 50 INJECTION INTRAVENOUS at 09:22

## 2021-07-26 RX ADMIN — POLYETHYLENE GLYCOL 3350, SODIUM SULFATE, SODIUM CHLORIDE, POTASSIUM CHLORIDE, ASCORBIC ACID, SODIUM ASCORBATE 1000 ML: KIT at 22:28

## 2021-07-26 RX ADMIN — PROPOFOL 50 MG: 10 INJECTION, EMULSION INTRAVENOUS at 09:23

## 2021-07-26 RX ADMIN — Medication 10 ML: at 20:42

## 2021-07-26 RX ADMIN — SODIUM CHLORIDE: 0.9 INJECTION, SOLUTION INTRAVENOUS at 09:20

## 2021-07-26 RX ADMIN — PROPOFOL 50 MG: 10 INJECTION, EMULSION INTRAVENOUS at 09:25

## 2021-07-26 NOTE — ANESTHESIA PREPROCEDURE EVALUATION
Anesthesia Evaluation     NPO Solid Status: > 8 hours  NPO Liquid Status: > 8 hours           Airway   Mallampati: II  TM distance: >3 FB  Neck ROM: full  No difficulty expected  Dental - normal exam     Pulmonary - normal exam    breath sounds clear to auscultation  (+) shortness of breath,   Cardiovascular - normal exam    Rhythm: regular  Rate: normal    (+) valvular problems/murmurs AS and AI,     ROS comment: S/P AVR 2021!!    Interpretation Summary    · Left ventricular systolic function is normal.  · Left ventricular ejection fraction is 60 to 65%  · Left ventricular wall thickness is consistent with mild concentric hypertrophy.  · Left ventricular diastolic function was normal.  · Moderate aortic valve regurgitation is present.  · Severe aortic valve stenosis is present. Aortic valve area is 0.54 cm2.  · Peak velocity of the flow distal to the aortic valve is 483.5 cm/s. Aortic valve maximum pressure gradient is 93.5 mmHg. Aortic valve mean pressure gradient is 47.4 mmHg.  · Saline test results are negative.           Neuro/Psych  (+) CVA,     GI/Hepatic/Renal/Endo    (+)  GERD, GI bleeding ,     Musculoskeletal     Abdominal  - normal exam   Substance History      OB/GYN          Other                      Anesthesia Plan    ASA 3     MAC     intravenous induction     Anesthetic plan, all risks, benefits, and alternatives have been provided, discussed and informed consent has been obtained with: patient.

## 2021-07-26 NOTE — ANESTHESIA POSTPROCEDURE EVALUATION
Patient: Chong Manuel    Procedure Summary     Date: 07/26/21 Room / Location: [facility] ENDOSCOPY 1 / [facility] ENDOSCOPY    Anesthesia Start: 0920 Anesthesia Stop: 0936    Procedure: ESOPHAGOGASTRODUODENOSCOPY (N/A ) Diagnosis:       Gastrointestinal hemorrhage, unspecified gastrointestinal hemorrhage type      (Gastrointestinal hemorrhage, unspecified gastrointestinal hemorrhage type [K92.2])    Surgeons: CIRA Pope MD Provider: Benitez Lyn MD    Anesthesia Type: MAC ASA Status: 3          Anesthesia Type: MAC    Vitals  Vitals Value Taken Time   /39 07/26/21 1015   Temp     Pulse 81 07/26/21 1018   Resp 20 07/26/21 1010   SpO2 96 % 07/26/21 1018   Vitals shown include unvalidated device data.        Post Anesthesia Care and Evaluation    Patient location during evaluation: PACU  Patient participation: complete - patient participated  Level of consciousness: awake  Pain scale: See nurse's notes for pain score.  Pain management: adequate  Airway patency: patent  Anesthetic complications: No anesthetic complications  PONV Status: none  Cardiovascular status: acceptable  Respiratory status: acceptable  Hydration status: acceptable    Comments: Patient seen and examined postoperatively; vital signs stable; SpO2 greater than or equal to 90%; cardiopulmonary status stable; nausea/vomiting adequately controlled; pain adequately controlled; no apparent anesthesia complications; patient discharged from anesthesia care when discharge criteria were met

## 2021-07-27 ENCOUNTER — ANESTHESIA (OUTPATIENT)
Dept: GASTROENTEROLOGY | Facility: HOSPITAL | Age: 76
End: 2021-07-27

## 2021-07-27 ENCOUNTER — INPATIENT HOSPITAL (AMBULATORY)
Dept: URBAN - METROPOLITAN AREA HOSPITAL 84 | Facility: HOSPITAL | Age: 76
End: 2021-07-27
Payer: COMMERCIAL

## 2021-07-27 VITALS
OXYGEN SATURATION: 94 % | DIASTOLIC BLOOD PRESSURE: 42 MMHG | SYSTOLIC BLOOD PRESSURE: 131 MMHG | TEMPERATURE: 97.7 F | WEIGHT: 164.9 LBS | HEIGHT: 68 IN | HEART RATE: 75 BPM | RESPIRATION RATE: 18 BRPM | BODY MASS INDEX: 24.99 KG/M2

## 2021-07-27 DIAGNOSIS — K92.1 MELENA: ICD-10-CM

## 2021-07-27 DIAGNOSIS — D12.4 BENIGN NEOPLASM OF DESCENDING COLON: ICD-10-CM

## 2021-07-27 DIAGNOSIS — D50.0 IRON DEFICIENCY ANEMIA SECONDARY TO BLOOD LOSS (CHRONIC): ICD-10-CM

## 2021-07-27 DIAGNOSIS — K57.30 DIVERTICULOSIS OF LARGE INTESTINE WITHOUT PERFORATION OR ABS: ICD-10-CM

## 2021-07-27 LAB
ALBUMIN SERPL-MCNC: 3.8 G/DL (ref 3.5–5.2)
ALBUMIN/GLOB SERPL: 1.8 G/DL
ALP SERPL-CCNC: 29 U/L (ref 39–117)
ALT SERPL W P-5'-P-CCNC: 6 U/L (ref 1–41)
ANION GAP SERPL CALCULATED.3IONS-SCNC: 13 MMOL/L (ref 5–15)
AST SERPL-CCNC: 20 U/L (ref 1–40)
BASOPHILS # BLD AUTO: 0 10*3/MM3 (ref 0–0.2)
BASOPHILS NFR BLD AUTO: 0.7 % (ref 0–1.5)
BILIRUB SERPL-MCNC: 0.4 MG/DL (ref 0–1.2)
BUN SERPL-MCNC: 17 MG/DL (ref 8–23)
BUN/CREAT SERPL: 14 (ref 7–25)
CALCIUM SPEC-SCNC: 8.6 MG/DL (ref 8.6–10.5)
CHLORIDE SERPL-SCNC: 104 MMOL/L (ref 98–107)
CO2 SERPL-SCNC: 23 MMOL/L (ref 22–29)
CREAT SERPL-MCNC: 1.21 MG/DL (ref 0.76–1.27)
DEPRECATED RDW RBC AUTO: 56 FL (ref 37–54)
EOSINOPHIL # BLD AUTO: 0.2 10*3/MM3 (ref 0–0.4)
EOSINOPHIL NFR BLD AUTO: 3.2 % (ref 0.3–6.2)
ERYTHROCYTE [DISTWIDTH] IN BLOOD BY AUTOMATED COUNT: 19.5 % (ref 12.3–15.4)
GFR SERPL CREATININE-BSD FRML MDRD: 58 ML/MIN/1.73
GLOBULIN UR ELPH-MCNC: 2.1 GM/DL
GLUCOSE SERPL-MCNC: 120 MG/DL (ref 65–99)
HCT VFR BLD AUTO: 26.1 % (ref 37.5–51)
HGB BLD-MCNC: 8.8 G/DL (ref 13–17.7)
LYMPHOCYTES # BLD AUTO: 1.1 10*3/MM3 (ref 0.7–3.1)
LYMPHOCYTES NFR BLD AUTO: 18.4 % (ref 19.6–45.3)
MCH RBC QN AUTO: 27.7 PG (ref 26.6–33)
MCHC RBC AUTO-ENTMCNC: 33.7 G/DL (ref 31.5–35.7)
MCV RBC AUTO: 82.2 FL (ref 79–97)
MONOCYTES # BLD AUTO: 0.5 10*3/MM3 (ref 0.1–0.9)
MONOCYTES NFR BLD AUTO: 8.7 % (ref 5–12)
NEUTROPHILS NFR BLD AUTO: 4.3 10*3/MM3 (ref 1.7–7)
NEUTROPHILS NFR BLD AUTO: 69 % (ref 42.7–76)
NRBC BLD AUTO-RTO: 0 /100 WBC (ref 0–0.2)
PLATELET # BLD AUTO: 257 10*3/MM3 (ref 140–450)
PMV BLD AUTO: 7.4 FL (ref 6–12)
POTASSIUM SERPL-SCNC: 3.7 MMOL/L (ref 3.5–5.2)
PROT SERPL-MCNC: 5.9 G/DL (ref 6–8.5)
RBC # BLD AUTO: 3.18 10*6/MM3 (ref 4.14–5.8)
SODIUM SERPL-SCNC: 140 MMOL/L (ref 136–145)
VIT B12 BLD-MCNC: 533 PG/ML (ref 211–946)
WBC # BLD AUTO: 6.2 10*3/MM3 (ref 3.4–10.8)

## 2021-07-27 PROCEDURE — 85025 COMPLETE CBC W/AUTO DIFF WBC: CPT | Performed by: NURSE PRACTITIONER

## 2021-07-27 PROCEDURE — 88305 TISSUE EXAM BY PATHOLOGIST: CPT | Performed by: INTERNAL MEDICINE

## 2021-07-27 PROCEDURE — 82607 VITAMIN B-12: CPT | Performed by: NURSE PRACTITIONER

## 2021-07-27 PROCEDURE — 25010000002 ONDANSETRON PER 1 MG: Performed by: STUDENT IN AN ORGANIZED HEALTH CARE EDUCATION/TRAINING PROGRAM

## 2021-07-27 PROCEDURE — 99232 SBSQ HOSP IP/OBS MODERATE 35: CPT | Performed by: HOSPITALIST

## 2021-07-27 PROCEDURE — 0DBM8ZZ EXCISION OF DESCENDING COLON, VIA NATURAL OR ARTIFICIAL OPENING ENDOSCOPIC: ICD-10-PCS | Performed by: INTERNAL MEDICINE

## 2021-07-27 PROCEDURE — 45385 COLONOSCOPY W/LESION REMOVAL: CPT | Performed by: INTERNAL MEDICINE

## 2021-07-27 PROCEDURE — 25010000002 PROPOFOL 10 MG/ML EMULSION: Performed by: ANESTHESIOLOGY

## 2021-07-27 PROCEDURE — 99024 POSTOP FOLLOW-UP VISIT: CPT | Performed by: NURSE PRACTITIONER

## 2021-07-27 PROCEDURE — 80053 COMPREHEN METABOLIC PANEL: CPT | Performed by: NURSE PRACTITIONER

## 2021-07-27 RX ORDER — SODIUM CHLORIDE 9 MG/ML
INJECTION, SOLUTION INTRAVENOUS CONTINUOUS PRN
Status: DISCONTINUED | OUTPATIENT
Start: 2021-07-27 | End: 2021-07-27 | Stop reason: SURG

## 2021-07-27 RX ORDER — EPHEDRINE SULFATE 50 MG/ML
INJECTION INTRAVENOUS AS NEEDED
Status: DISCONTINUED | OUTPATIENT
Start: 2021-07-27 | End: 2021-07-27 | Stop reason: SURG

## 2021-07-27 RX ORDER — SODIUM CHLORIDE 0.9 % (FLUSH) 0.9 %
10 SYRINGE (ML) INJECTION EVERY 12 HOURS SCHEDULED
Status: DISCONTINUED | OUTPATIENT
Start: 2021-07-27 | End: 2021-07-27 | Stop reason: HOSPADM

## 2021-07-27 RX ORDER — SODIUM CHLORIDE 0.9 % (FLUSH) 0.9 %
10 SYRINGE (ML) INJECTION AS NEEDED
Status: DISCONTINUED | OUTPATIENT
Start: 2021-07-27 | End: 2021-07-27 | Stop reason: HOSPADM

## 2021-07-27 RX ORDER — SODIUM CHLORIDE, SODIUM LACTATE, POTASSIUM CHLORIDE, CALCIUM CHLORIDE 600; 310; 30; 20 MG/100ML; MG/100ML; MG/100ML; MG/100ML
9 INJECTION, SOLUTION INTRAVENOUS CONTINUOUS PRN
Status: DISCONTINUED | OUTPATIENT
Start: 2021-07-27 | End: 2021-07-27 | Stop reason: HOSPADM

## 2021-07-27 RX ORDER — PHENYLEPHRINE HCL IN 0.9% NACL 1 MG/10 ML
SYRINGE (ML) INTRAVENOUS AS NEEDED
Status: DISCONTINUED | OUTPATIENT
Start: 2021-07-27 | End: 2021-07-27 | Stop reason: SURG

## 2021-07-27 RX ORDER — SORBITOL SOLUTION 70 %
50 SOLUTION, ORAL MISCELLANEOUS ONCE
Status: COMPLETED | OUTPATIENT
Start: 2021-07-27 | End: 2021-07-27

## 2021-07-27 RX ORDER — PROPOFOL 10 MG/ML
VIAL (ML) INTRAVENOUS AS NEEDED
Status: DISCONTINUED | OUTPATIENT
Start: 2021-07-27 | End: 2021-07-27 | Stop reason: SURG

## 2021-07-27 RX ORDER — BISACODYL 5 MG/1
20 TABLET, DELAYED RELEASE ORAL ONCE
Status: COMPLETED | OUTPATIENT
Start: 2021-07-27 | End: 2021-07-27

## 2021-07-27 RX ADMIN — ONDANSETRON 4 MG: 2 INJECTION INTRAMUSCULAR; INTRAVENOUS at 09:37

## 2021-07-27 RX ADMIN — SODIUM CHLORIDE: 0.9 INJECTION, SOLUTION INTRAVENOUS at 12:14

## 2021-07-27 RX ADMIN — Medication 3 ML: at 08:15

## 2021-07-27 RX ADMIN — Medication 300 MCG: at 12:38

## 2021-07-27 RX ADMIN — Medication 200 MCG: at 12:29

## 2021-07-27 RX ADMIN — PROPOFOL 450 MG: 10 INJECTION, EMULSION INTRAVENOUS at 12:16

## 2021-07-27 RX ADMIN — Medication 3 ML: at 08:14

## 2021-07-27 RX ADMIN — Medication 10 ML: at 08:14

## 2021-07-27 RX ADMIN — EPHEDRINE SULFATE 20 MG: 50 INJECTION INTRAVENOUS at 12:22

## 2021-07-27 RX ADMIN — BISACODYL 20 MG: 5 TABLET, COATED ORAL at 08:14

## 2021-07-27 RX ADMIN — SORBITOL SOLUTION (BULK) 50 ML: 70 SOLUTION at 08:14

## 2021-07-27 RX ADMIN — LISINOPRIL 20 MG: 20 TABLET ORAL at 08:15

## 2021-07-27 RX ADMIN — Medication 200 MCG: at 12:19

## 2021-07-27 RX ADMIN — Medication 200 MCG: at 12:25

## 2021-07-27 RX ADMIN — Medication 200 MCG: at 12:21

## 2021-07-27 RX ADMIN — ATORVASTATIN CALCIUM 20 MG: 20 TABLET, FILM COATED ORAL at 08:14

## 2021-07-27 RX ADMIN — PANTOPRAZOLE SODIUM 40 MG: 40 TABLET, DELAYED RELEASE ORAL at 05:34

## 2021-07-27 NOTE — ANESTHESIA POSTPROCEDURE EVALUATION
Patient: Chong Manuel    Procedure Summary     Date: 07/27/21 Room / Location: [facility] ENDOSCOPY 1 / [facility] ENDOSCOPY    Anesthesia Start: 1214 Anesthesia Stop: 1246    Procedure: COLONOSCOPY with polypectomy (N/A ) Diagnosis:       Gastrointestinal hemorrhage, unspecified gastrointestinal hemorrhage type      (Gastrointestinal hemorrhage, unspecified gastrointestinal hemorrhage type [K92.2])    Surgeons: CIRA Pope MD Provider: Hieu Minor MD    Anesthesia Type: MAC ASA Status: 3          Anesthesia Type: MAC    Vitals  Vitals Value Taken Time   /44 07/27/21 1324   Temp     Pulse 80 07/27/21 1324   Resp 14 07/27/21 1324   SpO2 94 % 07/27/21 1324           Post Anesthesia Care and Evaluation    Patient location during evaluation: PACU  Patient participation: complete - patient participated  Level of consciousness: awake  Pain scale: See nurse's notes for pain score.  Pain management: adequate  Airway patency: patent  Anesthetic complications: No anesthetic complications  PONV Status: none  Cardiovascular status: acceptable  Respiratory status: acceptable  Hydration status: acceptable    Comments: Patient seen and examined postoperatively; vital signs stable; SpO2 greater than or equal to 90%; cardiopulmonary status stable; nausea/vomiting adequately controlled; pain adequately controlled; no apparent anesthesia complications; patient discharged from anesthesia care when discharge criteria were met

## 2021-07-27 NOTE — ANESTHESIA PREPROCEDURE EVALUATION
Anesthesia Evaluation     Patient summary reviewed and Nursing notes reviewed   NPO Solid Status: > 8 hours  NPO Liquid Status: > 8 hours           Airway   Mallampati: II  TM distance: >3 FB  Neck ROM: full  No difficulty expected  Dental - normal exam     Pulmonary - normal exam    breath sounds clear to auscultation  (+) shortness of breath,   Cardiovascular - normal exam    ECG reviewed  Rhythm: regular  Rate: normal    (+) hypertension, valvular problems/murmurs AS and AI, hyperlipidemia,     ROS comment: S/P AVR 2021!!  TTE 04/2021  Interpretation Summary  Left ventricular systolic function is normal.  ·Left ventricular ejection fraction is 60 to 65%  ·Left ventricular wall thickness is consistent with mild concentric hypertrophy.  ·Left ventricular diastolic function was normal.  ·Moderate aortic valve regurgitation is present.  ·Severe aortic valve stenosis is present. Aortic valve area is 0.54 cm2.  ·Peak velocity of the flow distal to the aortic valve is 483.5 cm/s. Aortic valve maximum pressure gradient is 93.5 mmHg. Aortic valve mean pressure gradient is 47.4 mmHg.  ·Saline test results are negative.           Neuro/Psych  (+) CVA,     GI/Hepatic/Renal/Endo    (+)  GERD, GI bleeding ,     Musculoskeletal     Abdominal  - normal exam   Substance History      OB/GYN          Other                        Anesthesia Plan    ASA 3     MAC     intravenous induction     Anesthetic plan, all risks, benefits, and alternatives have been provided, discussed and informed consent has been obtained with: patient.    Plan discussed with CAA and CRNA.

## 2021-07-28 LAB
LAB AP CASE REPORT: NORMAL
PATH REPORT.FINAL DX SPEC: NORMAL
PATH REPORT.GROSS SPEC: NORMAL

## 2021-08-03 ENCOUNTER — HOSPITAL ENCOUNTER (OUTPATIENT)
Dept: CARDIOLOGY | Facility: HOSPITAL | Age: 76
Discharge: HOME | End: 2021-08-03

## 2021-08-03 ENCOUNTER — LAB (OUTPATIENT)
Dept: LAB | Facility: HOSPITAL | Age: 76
End: 2021-08-03

## 2021-08-03 ENCOUNTER — HOSPITAL ENCOUNTER (OUTPATIENT)
Dept: GENERAL RADIOLOGY | Facility: HOSPITAL | Age: 76
Discharge: HOME | End: 2021-08-03

## 2021-08-03 DIAGNOSIS — I35.0 AORTIC STENOSIS: ICD-10-CM

## 2021-08-03 DIAGNOSIS — R79.9 ABNORMAL FINDING OF BLOOD CHEMISTRY, UNSPECIFIED: ICD-10-CM

## 2021-08-03 DIAGNOSIS — R79.1 ABNORMAL COAGULATION PROFILE: ICD-10-CM

## 2021-08-03 LAB
ABO GROUP BLD: NORMAL
ALBUMIN SERPL-MCNC: 4.1 G/DL (ref 3.5–5.2)
ALBUMIN/GLOB SERPL: 1.7 G/DL
ALP SERPL-CCNC: 38 U/L (ref 39–117)
ALT SERPL W P-5'-P-CCNC: 5 U/L (ref 1–41)
ANION GAP SERPL CALCULATED.3IONS-SCNC: 8 MMOL/L (ref 5–15)
APTT PPP: 27.7 SECONDS (ref 24–31)
AST SERPL-CCNC: 17 U/L (ref 1–40)
BACTERIA UR QL AUTO: NORMAL /HPF
BASOPHILS # BLD AUTO: 0.1 10*3/MM3 (ref 0–0.2)
BASOPHILS NFR BLD AUTO: 1.2 % (ref 0–1.5)
BILIRUB SERPL-MCNC: 0.3 MG/DL (ref 0–1.2)
BILIRUB UR QL STRIP: NEGATIVE
BLD GP AB SCN SERPL QL: NEGATIVE
BUN SERPL-MCNC: 11 MG/DL (ref 8–23)
BUN/CREAT SERPL: 8.8 (ref 7–25)
CALCIUM SPEC-SCNC: 9.4 MG/DL (ref 8.6–10.5)
CHLORIDE SERPL-SCNC: 103 MMOL/L (ref 98–107)
CHOLEST SERPL-MCNC: 137 MG/DL (ref 0–200)
CLARITY UR: CLEAR
CLOSE TME COLL+ADP + EPINEP PNL BLD: 96 % (ref 86–100)
CO2 SERPL-SCNC: 26 MMOL/L (ref 22–29)
COLOR UR: YELLOW
CREAT SERPL-MCNC: 1.25 MG/DL (ref 0.76–1.27)
DEPRECATED RDW RBC AUTO: 55.6 FL (ref 37–54)
EOSINOPHIL # BLD AUTO: 0.2 10*3/MM3 (ref 0–0.4)
EOSINOPHIL NFR BLD AUTO: 4.1 % (ref 0.3–6.2)
ERYTHROCYTE [DISTWIDTH] IN BLOOD BY AUTOMATED COUNT: 19.3 % (ref 12.3–15.4)
GFR SERPL CREATININE-BSD FRML MDRD: 56 ML/MIN/1.73
GLOBULIN UR ELPH-MCNC: 2.4 GM/DL
GLUCOSE SERPL-MCNC: 102 MG/DL (ref 65–99)
GLUCOSE UR STRIP-MCNC: NEGATIVE MG/DL
HBA1C MFR BLD: 5.5 % (ref 3.5–5.6)
HCT VFR BLD AUTO: 28.2 % (ref 37.5–51)
HDLC SERPL-MCNC: 42 MG/DL (ref 40–60)
HGB BLD-MCNC: 9.5 G/DL (ref 13–17.7)
HGB UR QL STRIP.AUTO: NEGATIVE
HYALINE CASTS UR QL AUTO: NORMAL /LPF
INR PPP: 0.99 (ref 0.93–1.1)
KETONES UR QL STRIP: NEGATIVE
LDLC SERPL CALC-MCNC: 66 MG/DL (ref 0–100)
LDLC/HDLC SERPL: 1.44 {RATIO}
LEUKOCYTE ESTERASE UR QL STRIP.AUTO: ABNORMAL
LYMPHOCYTES # BLD AUTO: 1.2 10*3/MM3 (ref 0.7–3.1)
LYMPHOCYTES NFR BLD AUTO: 22.7 % (ref 19.6–45.3)
MCH RBC QN AUTO: 28.2 PG (ref 26.6–33)
MCHC RBC AUTO-ENTMCNC: 33.7 G/DL (ref 31.5–35.7)
MCV RBC AUTO: 83.6 FL (ref 79–97)
MONOCYTES # BLD AUTO: 0.5 10*3/MM3 (ref 0.1–0.9)
MONOCYTES NFR BLD AUTO: 8.7 % (ref 5–12)
NEUTROPHILS NFR BLD AUTO: 3.4 10*3/MM3 (ref 1.7–7)
NEUTROPHILS NFR BLD AUTO: 63.3 % (ref 42.7–76)
NITRITE UR QL STRIP: NEGATIVE
NRBC BLD AUTO-RTO: 0 /100 WBC (ref 0–0.2)
PH UR STRIP.AUTO: 7 [PH] (ref 5–8)
PLATELET # BLD AUTO: 291 10*3/MM3 (ref 140–450)
PMV BLD AUTO: 7.5 FL (ref 6–12)
POTASSIUM SERPL-SCNC: 5.3 MMOL/L (ref 3.5–5.2)
PROT SERPL-MCNC: 6.5 G/DL (ref 6–8.5)
PROT UR QL STRIP: NEGATIVE
PROTHROMBIN TIME: 11 SECONDS (ref 9.6–11.7)
RBC # BLD AUTO: 3.37 10*6/MM3 (ref 4.14–5.8)
RBC # UR: NORMAL /HPF
REF LAB TEST METHOD: NORMAL
RH BLD: NEGATIVE
SARS-COV-2 RNA PNL SPEC NAA+PROBE: NOT DETECTED
SODIUM SERPL-SCNC: 137 MMOL/L (ref 136–145)
SP GR UR STRIP: 1.01 (ref 1–1.03)
SQUAMOUS #/AREA URNS HPF: NORMAL /HPF
T&S EXPIRATION DATE: NORMAL
TRIGL SERPL-MCNC: 172 MG/DL (ref 0–150)
UROBILINOGEN UR QL STRIP: ABNORMAL
VLDLC SERPL-MCNC: 29 MG/DL (ref 5–40)
WBC # BLD AUTO: 5.4 10*3/MM3 (ref 3.4–10.8)
WBC UR QL AUTO: NORMAL /HPF

## 2021-08-03 PROCEDURE — 80053 COMPREHEN METABOLIC PANEL: CPT

## 2021-08-03 PROCEDURE — 85576 BLOOD PLATELET AGGREGATION: CPT

## 2021-08-03 PROCEDURE — 85610 PROTHROMBIN TIME: CPT

## 2021-08-03 PROCEDURE — 86850 RBC ANTIBODY SCREEN: CPT

## 2021-08-03 PROCEDURE — C9803 HOPD COVID-19 SPEC COLLECT: HCPCS

## 2021-08-03 PROCEDURE — 93010 ELECTROCARDIOGRAM REPORT: CPT | Performed by: INTERNAL MEDICINE

## 2021-08-03 PROCEDURE — 93005 ELECTROCARDIOGRAM TRACING: CPT | Performed by: NURSE PRACTITIONER

## 2021-08-03 PROCEDURE — 36415 COLL VENOUS BLD VENIPUNCTURE: CPT

## 2021-08-03 PROCEDURE — 85025 COMPLETE CBC W/AUTO DIFF WBC: CPT

## 2021-08-03 PROCEDURE — 86901 BLOOD TYPING SEROLOGIC RH(D): CPT

## 2021-08-03 PROCEDURE — 86923 COMPATIBILITY TEST ELECTRIC: CPT

## 2021-08-03 PROCEDURE — 87086 URINE CULTURE/COLONY COUNT: CPT

## 2021-08-03 PROCEDURE — 83036 HEMOGLOBIN GLYCOSYLATED A1C: CPT

## 2021-08-03 PROCEDURE — 80061 LIPID PANEL: CPT

## 2021-08-03 PROCEDURE — 71046 X-RAY EXAM CHEST 2 VIEWS: CPT

## 2021-08-03 PROCEDURE — 87641 MR-STAPH DNA AMP PROBE: CPT | Performed by: NURSE PRACTITIONER

## 2021-08-03 PROCEDURE — 81001 URINALYSIS AUTO W/SCOPE: CPT

## 2021-08-03 PROCEDURE — 87635 SARS-COV-2 COVID-19 AMP PRB: CPT

## 2021-08-03 PROCEDURE — 86900 BLOOD TYPING SEROLOGIC ABO: CPT

## 2021-08-03 PROCEDURE — 85730 THROMBOPLASTIN TIME PARTIAL: CPT

## 2021-08-03 NOTE — H&P
Patient Care Team:  Garcia Johnson MD as PCP - General (Family Medicine)    Chief complaint I need heart surgery    Subjective     History of Present Illness: Patient is a 76 year old male with known PMH of HTN, emphysema, and hx of stroke. Patient presents to preadmission testing prior to scheduled cardiac surgery with Dr. Paidlla. He denies any concerns or complaints. He was recently hospitalized for severe anemia. EGD and colonoscopy failed to reveal source. Anemia has improved. Patient denies any melena.    Past Medical History:   Diagnosis Date   • Anemia    • GERD (gastroesophageal reflux disease)    • History of transfusion    • Hyperlipidemia    • Hypertension    • Lung nodules     left    • Shortness of breath 4/19/2021   • Stroke (CMS/HCC) 2011     Past Surgical History:   Procedure Laterality Date   • CARDIAC CATHETERIZATION N/A 4/27/2021    Procedure: Left Heart Cath;  Surgeon: Darian Fajardo MD;  Location: [facility] CATH INVASIVE LOCATION;  Service: Cardiovascular;  Laterality: N/A;   • CARDIAC CATHETERIZATION N/A 4/27/2021    Procedure: Right Heart Cath;  Surgeon: Darian Fajardo MD;  Location: [facility] CATH INVASIVE LOCATION;  Service: Cardiovascular;  Laterality: N/A;   • COLONOSCOPY     • COLONOSCOPY N/A 7/27/2021    Procedure: COLONOSCOPY with polypectomy;  Surgeon: CIRA Pope MD;  Location: [facility] ENDOSCOPY;  Service: Gastroenterology;  Laterality: N/A;  post op: diverticulosis, polyp   • ENDOSCOPY N/A 7/26/2021    Procedure: ESOPHAGOGASTRODUODENOSCOPY;  Surgeon: CIRA Pope MD;  Location: [facility] ENDOSCOPY;  Service: Gastroenterology;  Laterality: N/A;  HH, gastric polyps   • HERNIA REPAIR       Family History   Problem Relation Age of Onset   • Heart attack Mother    • Heart failure Mother      Social History     Tobacco Use   • Smoking status: Former Smoker   • Smokeless tobacco: Never Used   Vaping Use   • Vaping Use: Never used   Substance Use Topics   • Alcohol use: No   • Drug  use: No       Objective      Vital Signs  There is no height or weight on file to calculate BMI.  No intake or output data in the 24 hours ending 08/03/21 1236  No intake/output data recorded.       Physical Exam  Constitutional:       Appearance: Normal appearance.   HENT:      Head: Normocephalic and atraumatic.   Eyes:      Extraocular Movements: Extraocular movements intact.      Pupils: Pupils are equal, round, and reactive to light.   Cardiovascular:      Rate and Rhythm: Normal rate and regular rhythm.   Pulmonary:      Effort: Pulmonary effort is normal.      Breath sounds: Normal breath sounds.   Abdominal:      General: Abdomen is flat.   Musculoskeletal:         General: Normal range of motion.      Cervical back: Normal range of motion and neck supple.   Skin:     General: Skin is warm and dry.   Neurological:      General: No focal deficit present.      Mental Status: He is alert and oriented to person, place, and time. Mental status is at baseline.   Psychiatric:         Mood and Affect: Mood normal.         Behavior: Behavior normal.         Thought Content: Thought content normal.         Judgment: Judgment normal.         Results Review:   I reviewed the patient's new clinical results.    WBC WBC   Date Value Ref Range Status   08/03/2021 5.40 3.40 - 10.80 10*3/mm3 Final      HGB Hemoglobin   Date Value Ref Range Status   08/03/2021 9.5 (L) 13.0 - 17.7 g/dL Final      HCT Hematocrit   Date Value Ref Range Status   08/03/2021 28.2 (L) 37.5 - 51.0 % Final      Platlets No results found for: LABPLAT     PT/INR:      Protime   Date Value Ref Range Status   08/03/2021 11.0 9.6 - 11.7 Seconds Final   /  INR   Date Value Ref Range Status   08/03/2021 0.99 0.93 - 1.10 Final       Sodium Sodium   Date Value Ref Range Status   08/03/2021 137 136 - 145 mmol/L Final      Potassium Potassium   Date Value Ref Range Status   08/03/2021 5.3 (H) 3.5 - 5.2 mmol/L Final      Chloride Chloride   Date Value Ref Range  Status   08/03/2021 103 98 - 107 mmol/L Final      Bicarbonate No results found for: PLASMABICARB   BUN BUN   Date Value Ref Range Status   08/03/2021 11 8 - 23 mg/dL Final      Creatinine Creatinine   Date Value Ref Range Status   08/03/2021 1.25 0.76 - 1.27 mg/dL Final      Calcium Calcium   Date Value Ref Range Status   08/03/2021 9.4 8.6 - 10.5 mg/dL Final      Magnesium No results found for: MG     pH No results found for: PHART   pO2 No results found for: PO2ART   pCO2 No results found for: PMA4MUT   HCO3 No results found for: OQR3LPA     Assessment/Plan     Patient Active Problem List   Diagnosis Code   • Acute on chronic blood loss anemia D62   • Essential hypertension I10   • Hyperlipidemia E78.5   • Chronic GERD K21.9   • CVA (cerebral vascular accident) (CMS/HCC) I63.9   • Hypertrophy of prostate without urinary obstruction and other lower urinary tract symptoms (LUTS) N40.0   • Iron deficiency anemia D50.9   • Pure hypercholesterolemia E78.00   • Solitary pulmonary nodule R91.1   • Shortness of breath R06.02   • Chest discomfort R07.89   • Aortic stenosis, severe I35.0   • Aortic stenosis I35.0   • GI bleed K92.2       Assessment & Plan  Severe AS, EF 60-65%--plans for AVR on 8/5 with Dr. Padilla  Recent anemia of unknown source  HTN--stable  Hx stroke--CT head in May, mild atrophy  Emphysema--per CT scan, can not r/o ILD, DLCO 26  Lung nodules--c/w granulomatous disease    Plans for AVR with Dr. Padilla on 8/5/21.    I discussed the patients findings and my recommendations with patient and family    OBI SHETTYSTEVE, APRN  08/03/21  12:36 EDT

## 2021-08-04 ENCOUNTER — ANESTHESIA EVENT (OUTPATIENT)
Dept: PERIOP | Facility: HOSPITAL | Age: 76
End: 2021-08-04

## 2021-08-04 LAB
BACTERIA SPEC AEROBE CULT: NO GROWTH
QT INTERVAL: 365 MS

## 2021-08-04 NOTE — ANESTHESIA PREPROCEDURE EVALUATION
Anesthesia Evaluation     Patient summary reviewed and Nursing notes reviewed   NPO Solid Status: > 8 hours  NPO Liquid Status: > 8 hours           Airway   Mallampati: II  TM distance: >3 FB  Neck ROM: full  No difficulty expected  Dental - normal exam     Pulmonary - normal exam    breath sounds clear to auscultation  (+) shortness of breath,   Cardiovascular - normal exam    ECG reviewed  Rhythm: regular  Rate: normal    (+) hypertension, valvular problems/murmurs AS and AI, CAD, hyperlipidemia,     ROS comment: S/P AVR 2021!!  TTE 04/2021  Interpretation Summary  Left ventricular systolic function is normal.  ·Left ventricular ejection fraction is 60 to 65%  ·Left ventricular wall thickness is consistent with mild concentric hypertrophy.  ·Left ventricular diastolic function was normal.  ·Moderate aortic valve regurgitation is present.  ·Severe aortic valve stenosis is present. Aortic valve area is 0.54 cm2.  ·Peak velocity of the flow distal to the aortic valve is 483.5 cm/s. Aortic valve maximum pressure gradient is 93.5 mmHg. Aortic valve mean pressure gradient is 47.4 mmHg.  ·Saline test results are negative.     FINDINGS:     1. HEMODYNAMICS:       Aortic pressure: 115/68     LVEDP: 15 mmHg     Gradient across aortic valve on pullback: 38.1 mmHg mean     Aortic valve area of 0.8 cm² was noted     Right atrial pressure: 5mmHg     Right ventricle pressure: 41/5 mmHg     Pulmonary artery pressure: 41/18/20 7 mmHg     Cardiac output: 3.9 L/min     Aortic valve area: 0.8 cm² with mean gradient of 38.1 mmHg        2. LEFT VENTRICULOGRAPHY: 60%        3. CORONARY ANGIOGRAPHY:            A: Left main coronary artery: Short and normal            B: Left anterior descending artery: 25 to 30% plaque in the midsegment.  No high-grade stenosis            C: Left circumflex coronary artery: LCx is dominant vessel and has 25% plaque in the mid segment and PDA has 10 to 20% plaque in the proximal segment            D:  Right coronary artery: Nondominant     SUMMARY:      Minimal coronary artery disease  Severe aortic stenosis with aortic valve area of 0.8 cm² and mean gradient of 38.1 mm²      Neuro/Psych  (+) CVA,     GI/Hepatic/Renal/Endo    (+)  GERD, GI bleeding ,     Musculoskeletal     Abdominal  - normal exam   Substance History      OB/GYN          Other        ROS/Med Hx Other: Recent RAFAT Report  · Left ventricular systolic function is normal.  · Left ventricular ejection fraction is 60 to 65%  · Left ventricular wall thickness is consistent with mild concentric hypertrophy.  · Left ventricular diastolic function was normal.  · Moderate aortic valve regurgitation is present.  · Severe aortic valve stenosis is present. Aortic valve area is 0.54 cm2.  · Peak velocity of the flow distal to the aortic valve is 483.5 cm/s. Aortic valve maximum pressure gradient is 93.5 mmHg. Aortic valve mean pressure gradient is 47.4 mmHg.                  Anesthesia Plan    ASA 4     general   (Plan intraop Art Line, PA Cath and RAFAT probe placement and monitoring,)

## 2021-08-05 ENCOUNTER — HOSPITAL ENCOUNTER (INPATIENT)
Facility: HOSPITAL | Age: 76
LOS: 5 days | Discharge: HOME HEALTH | End: 2021-08-10
Attending: THORACIC SURGERY (CARDIOTHORACIC VASCULAR SURGERY) | Admitting: THORACIC SURGERY (CARDIOTHORACIC VASCULAR SURGERY)

## 2021-08-05 ENCOUNTER — APPOINTMENT (OUTPATIENT)
Dept: GENERAL RADIOLOGY | Facility: HOSPITAL | Age: 76
End: 2021-08-05

## 2021-08-05 ENCOUNTER — ANESTHESIA (OUTPATIENT)
Dept: PERIOP | Facility: HOSPITAL | Age: 76
End: 2021-08-05

## 2021-08-05 DIAGNOSIS — Z95.2 S/P AVR: ICD-10-CM

## 2021-08-05 DIAGNOSIS — I35.0 AORTIC STENOSIS: Primary | ICD-10-CM

## 2021-08-05 LAB
ACT BLD: 109 SECONDS (ref 89–137)
ACT BLD: 131 SECONDS (ref 89–137)
ACT BLD: 345 SECONDS (ref 89–137)
ACT BLD: 411 SECONDS (ref 89–137)
ACT BLD: 450 SECONDS (ref 89–137)
ALBUMIN SERPL-MCNC: 4.3 G/DL (ref 3.5–5.2)
ALBUMIN SERPL-MCNC: 4.7 G/DL (ref 3.5–5.2)
ANION GAP SERPL CALCULATED.3IONS-SCNC: 10 MMOL/L (ref 5–15)
ANION GAP SERPL CALCULATED.3IONS-SCNC: 8 MMOL/L (ref 5–15)
APTT PPP: 29.2 SECONDS (ref 24–31)
ARTERIAL PATENCY WRIST A: ABNORMAL
ATMOSPHERIC PRESS: ABNORMAL MM[HG]
BASE DEFICIT: ABNORMAL
BASE EXCESS BLDA CALC-SCNC: -1.5 MMOL/L (ref 0–3)
BASE EXCESS BLDA CALC-SCNC: -2 MMOL/L (ref 0–3)
BASE EXCESS BLDA CALC-SCNC: -2 MMOL/L (ref 0–3)
BASE EXCESS BLDA CALC-SCNC: -2.3 MMOL/L (ref 0–3)
BASE EXCESS BLDA CALC-SCNC: -2.4 MMOL/L (ref 0–3)
BASE EXCESS BLDA CALC-SCNC: <0 MMOL/L (ref 0–3)
BASE EXCESS BLDV CALC-SCNC: ABNORMAL MMOL/L
BASOPHILS # BLD AUTO: 0 10*3/MM3 (ref 0–0.2)
BASOPHILS NFR BLD AUTO: 0.2 % (ref 0–1.5)
BDY SITE: ABNORMAL
BH BB BLOOD EXPIRATION DATE: NORMAL
BH BB BLOOD TYPE BARCODE: 9500
BH BB DISPENSE STATUS: NORMAL
BH BB PRODUCT CODE: NORMAL
BH BB UNIT NUMBER: NORMAL
BUN SERPL-MCNC: 12 MG/DL (ref 8–23)
BUN SERPL-MCNC: 15 MG/DL (ref 8–23)
BUN/CREAT SERPL: 10.3 (ref 7–25)
BUN/CREAT SERPL: 8.1 (ref 7–25)
CA-I BLDA-SCNC: 1.07 MMOL/L (ref 1.12–1.32)
CA-I BLDA-SCNC: 1.08 MMOL/L (ref 1.15–1.33)
CA-I BLDA-SCNC: 1.1 MMOL/L (ref 1.12–1.32)
CA-I BLDA-SCNC: 1.12 MMOL/L (ref 1.12–1.32)
CA-I BLDA-SCNC: 1.14 MMOL/L (ref 1.15–1.33)
CA-I BLDA-SCNC: 1.18 MMOL/L (ref 1.15–1.33)
CA-I BLDA-SCNC: 1.23 MMOL/L (ref 1.15–1.33)
CA-I BLDA-SCNC: 1.25 MMOL/L (ref 1.15–1.33)
CA-I BLDA-SCNC: 1.33 MMOL/L (ref 1.12–1.32)
CA-I BLDA-SCNC: 1.43 MMOL/L (ref 1.12–1.32)
CA-I BLDA-SCNC: 1.51 MMOL/L (ref 1.12–1.32)
CA-I SERPL ISE-MCNC: 1.33 MMOL/L (ref 1.2–1.3)
CALCIUM SPEC-SCNC: 9.5 MG/DL (ref 8.6–10.5)
CALCIUM SPEC-SCNC: 9.5 MG/DL (ref 8.6–10.5)
CHLORIDE SERPL-SCNC: 105 MMOL/L (ref 98–107)
CHLORIDE SERPL-SCNC: 106 MMOL/L (ref 98–107)
CO2 BLDA-SCNC: 23.6 MMOL/L (ref 22–29)
CO2 BLDA-SCNC: 24.9 MMOL/L (ref 22–29)
CO2 BLDA-SCNC: 24.9 MMOL/L (ref 22–29)
CO2 BLDA-SCNC: 25.1 MMOL/L (ref 22–29)
CO2 BLDA-SCNC: 25.2 MMOL/L (ref 22–29)
CO2 BLDA-SCNC: 26 MMOL/L (ref 23–27)
CO2 BLDA-SCNC: 27 MMOL/L (ref 23–27)
CO2 BLDA-SCNC: 28 MMOL/L (ref 23–27)
CO2 CONTENT VENOUS: ABNORMAL
CO2 SERPL-SCNC: 23 MMOL/L (ref 22–29)
CO2 SERPL-SCNC: 25 MMOL/L (ref 22–29)
CREAT SERPL-MCNC: 1.46 MG/DL (ref 0.76–1.27)
CREAT SERPL-MCNC: 1.48 MG/DL (ref 0.76–1.27)
CROSSMATCH INTERPRETATION: NORMAL
DEPRECATED RDW RBC AUTO: 53.8 FL (ref 37–54)
DEPRECATED RDW RBC AUTO: 54.7 FL (ref 37–54)
EOSINOPHIL # BLD AUTO: 0.1 10*3/MM3 (ref 0–0.4)
EOSINOPHIL NFR BLD AUTO: 1.2 % (ref 0.3–6.2)
ERYTHROCYTE [DISTWIDTH] IN BLOOD BY AUTOMATED COUNT: 18.6 % (ref 12.3–15.4)
ERYTHROCYTE [DISTWIDTH] IN BLOOD BY AUTOMATED COUNT: 19 % (ref 12.3–15.4)
FIBRINOGEN PPP-MCNC: 195 MG/DL (ref 210–450)
GFR SERPL CREATININE-BSD FRML MDRD: 46 ML/MIN/1.73
GFR SERPL CREATININE-BSD FRML MDRD: 47 ML/MIN/1.73
GLUCOSE BLDC GLUCOMTR-MCNC: 100 MG/DL (ref 74–100)
GLUCOSE BLDC GLUCOMTR-MCNC: 100 MG/DL (ref 74–100)
GLUCOSE BLDC GLUCOMTR-MCNC: 103 MG/DL (ref 70–105)
GLUCOSE BLDC GLUCOMTR-MCNC: 115 MG/DL (ref 74–100)
GLUCOSE BLDC GLUCOMTR-MCNC: 115 MG/DL (ref 74–100)
GLUCOSE BLDC GLUCOMTR-MCNC: 116 MG/DL (ref 70–105)
GLUCOSE BLDC GLUCOMTR-MCNC: 122 MG/DL (ref 70–105)
GLUCOSE BLDC GLUCOMTR-MCNC: 127 MG/DL (ref 74–100)
GLUCOSE BLDC GLUCOMTR-MCNC: 127 MG/DL (ref 74–100)
GLUCOSE BLDC GLUCOMTR-MCNC: 128 MG/DL (ref 70–105)
GLUCOSE BLDC GLUCOMTR-MCNC: 136 MG/DL (ref 70–105)
GLUCOSE BLDC GLUCOMTR-MCNC: 139 MG/DL (ref 70–105)
GLUCOSE BLDC GLUCOMTR-MCNC: 139 MG/DL (ref 70–105)
GLUCOSE BLDC GLUCOMTR-MCNC: 143 MG/DL (ref 74–100)
GLUCOSE BLDC GLUCOMTR-MCNC: 143 MG/DL (ref 74–100)
GLUCOSE BLDC GLUCOMTR-MCNC: 146 MG/DL (ref 70–105)
GLUCOSE BLDC GLUCOMTR-MCNC: 152 MG/DL (ref 70–105)
GLUCOSE BLDC GLUCOMTR-MCNC: 159 MG/DL (ref 74–100)
GLUCOSE BLDC GLUCOMTR-MCNC: 159 MG/DL (ref 74–100)
GLUCOSE BLDC GLUCOMTR-MCNC: 163 MG/DL (ref 70–105)
GLUCOSE BLDC GLUCOMTR-MCNC: 164 MG/DL (ref 70–105)
GLUCOSE BLDC GLUCOMTR-MCNC: 173 MG/DL (ref 70–105)
GLUCOSE BLDC GLUCOMTR-MCNC: 182 MG/DL (ref 70–105)
GLUCOSE SERPL-MCNC: 151 MG/DL (ref 65–99)
GLUCOSE SERPL-MCNC: 98 MG/DL (ref 65–99)
HCO3 BLDA-SCNC: 22.4 MMOL/L (ref 21–28)
HCO3 BLDA-SCNC: 23.6 MMOL/L (ref 21–28)
HCO3 BLDA-SCNC: 23.6 MMOL/L (ref 21–28)
HCO3 BLDA-SCNC: 23.8 MMOL/L (ref 21–28)
HCO3 BLDA-SCNC: 23.8 MMOL/L (ref 21–28)
HCO3 BLDA-SCNC: 24.1 MMOL/L (ref 22–26)
HCO3 BLDA-SCNC: 24.7 MMOL/L (ref 22–26)
HCO3 BLDA-SCNC: 26.1 MMOL/L (ref 22–26)
HCO3 BLDA-SCNC: 26.1 MMOL/L (ref 22–26)
HCO3 BLDA-SCNC: 26.2 MMOL/L (ref 22–26)
HCO3 BLDV-SCNC: 30 MMOL/L (ref 23–28)
HCT VFR BLD AUTO: 21.2 % (ref 37.5–51)
HCT VFR BLD AUTO: 22.8 % (ref 37.5–51)
HCT VFR BLDA CALC: 19 % (ref 38–51)
HCT VFR BLDA CALC: 20 % (ref 38–51)
HCT VFR BLDA CALC: 20 % (ref 38–51)
HCT VFR BLDA CALC: 21 % (ref 38–51)
HCT VFR BLDA CALC: 21 % (ref 38–51)
HCT VFR BLDA CALC: 22 % (ref 38–51)
HCT VFR BLDA CALC: 23 % (ref 38–51)
HCT VFR BLDA CALC: 24 % (ref 38–51)
HCT VFR BLDA CALC: 24 % (ref 38–51)
HEMODILUTION: NO
HEMODILUTION: YES
HGB BLD-MCNC: 7.2 G/DL (ref 13–17.7)
HGB BLD-MCNC: 7.4 G/DL (ref 13–17.7)
HGB BLDA-MCNC: 6.5 G/DL (ref 12–17)
HGB BLDA-MCNC: 6.8 G/DL (ref 12–17)
HGB BLDA-MCNC: 6.8 G/DL (ref 12–17)
HGB BLDA-MCNC: 7.1 G/DL (ref 12–17)
HGB BLDA-MCNC: 7.1 G/DL (ref 12–17)
HGB BLDA-MCNC: 7.4 G/DL (ref 12–17)
HGB BLDA-MCNC: 7.7 G/DL (ref 12–17)
HGB BLDA-MCNC: 7.8 G/DL (ref 12–17)
HGB BLDA-MCNC: 8 G/DL (ref 12–17)
HGB BLDA-MCNC: 8.1 G/DL (ref 12–17)
HGB BLDA-MCNC: 8.2 G/DL (ref 12–17)
INHALED O2 CONCENTRATION: 36 %
INHALED O2 CONCENTRATION: 40 %
INHALED O2 CONCENTRATION: 70 %
INR PPP: 1.19 (ref 0.93–1.1)
LYMPHOCYTES # BLD AUTO: 0.6 10*3/MM3 (ref 0.7–3.1)
LYMPHOCYTES NFR BLD AUTO: 7.5 % (ref 19.6–45.3)
MAGNESIUM SERPL-MCNC: 2.7 MG/DL (ref 1.6–2.4)
MAGNESIUM SERPL-MCNC: 3.1 MG/DL (ref 1.6–2.4)
MCH RBC QN AUTO: 26.9 PG (ref 26.6–33)
MCH RBC QN AUTO: 27.8 PG (ref 26.6–33)
MCHC RBC AUTO-ENTMCNC: 32.6 G/DL (ref 31.5–35.7)
MCHC RBC AUTO-ENTMCNC: 33.7 G/DL (ref 31.5–35.7)
MCV RBC AUTO: 82.3 FL (ref 79–97)
MCV RBC AUTO: 82.4 FL (ref 79–97)
MODALITY: ABNORMAL
MONOCYTES # BLD AUTO: 0.7 10*3/MM3 (ref 0.1–0.9)
MONOCYTES NFR BLD AUTO: 9.2 % (ref 5–12)
NEUTROPHILS NFR BLD AUTO: 6.6 10*3/MM3 (ref 1.7–7)
NEUTROPHILS NFR BLD AUTO: 81.9 % (ref 42.7–76)
NRBC BLD AUTO-RTO: 0 /100 WBC (ref 0–0.2)
PCO2 BLDA: 37.8 MM HG (ref 35–48)
PCO2 BLDA: 40.7 MM HG (ref 35–48)
PCO2 BLDA: 43 MM HG (ref 35–48)
PCO2 BLDA: 45 MM HG (ref 35–48)
PCO2 BLDA: 46.7 MM HG (ref 35–48)
PCO2 BLDA: 51.2 MM HG (ref 35–45)
PCO2 BLDA: 55.5 MM HG (ref 35–45)
PCO2 BLDA: 63.8 MM HG (ref 35–45)
PCO2 BLDA: 64.8 MM HG (ref 35–45)
PCO2 BLDA: 71.2 MM HG (ref 35–45)
PCO2 BLDV: 69.1 MM HG (ref 41–51)
PEEP RESPIRATORY: 5 CM[H2O]
PEEP RESPIRATORY: 8 CM[H2O]
PH BLDA: 7.17 PH UNITS (ref 7.35–7.45)
PH BLDA: 7.2 PH UNITS (ref 7.35–7.45)
PH BLDA: 7.21 PH UNITS (ref 7.35–7.45)
PH BLDA: 7.28 PH UNITS (ref 7.35–7.45)
PH BLDA: 7.28 PH UNITS (ref 7.35–7.45)
PH BLDA: 7.32 PH UNITS (ref 7.35–7.45)
PH BLDA: 7.33 PH UNITS (ref 7.35–7.45)
PH BLDA: 7.35 PH UNITS (ref 7.35–7.45)
PH BLDA: 7.37 PH UNITS (ref 7.35–7.45)
PH BLDA: 7.38 PH UNITS (ref 7.35–7.45)
PH BLDV: 7.25 PH UNITS (ref 7.31–7.41)
PHOSPHATE SERPL-MCNC: 3.4 MG/DL (ref 2.5–4.5)
PHOSPHATE SERPL-MCNC: 4.6 MG/DL (ref 2.5–4.5)
PLATELET # BLD AUTO: 155 10*3/MM3 (ref 140–450)
PLATELET # BLD AUTO: 160 10*3/MM3 (ref 140–450)
PMV BLD AUTO: 6.9 FL (ref 6–12)
PMV BLD AUTO: 7 FL (ref 6–12)
PO2 BLDA: 125 MM HG (ref 80–105)
PO2 BLDA: 129 MM HG (ref 83–108)
PO2 BLDA: 148 MM HG (ref 80–105)
PO2 BLDA: 350 MM HG (ref 80–105)
PO2 BLDA: 475 MM HG (ref 80–105)
PO2 BLDA: 542 MM HG (ref 80–105)
PO2 BLDA: 76.4 MM HG (ref 83–108)
PO2 BLDA: 92.3 MM HG (ref 83–108)
PO2 BLDA: 93.6 MM HG (ref 83–108)
PO2 BLDA: 96.7 MM HG (ref 83–108)
PO2 BLDV: 50 MM HG (ref 35–42)
POTASSIUM BLDA-SCNC: 4 MMOL/L (ref 3.5–4.5)
POTASSIUM BLDA-SCNC: 4.3 MMOL/L (ref 3.5–4.9)
POTASSIUM BLDA-SCNC: 4.3 MMOL/L (ref 3.5–4.9)
POTASSIUM BLDA-SCNC: 4.4 MMOL/L (ref 3.5–4.5)
POTASSIUM BLDA-SCNC: 4.4 MMOL/L (ref 3.5–4.9)
POTASSIUM BLDA-SCNC: 4.5 MMOL/L (ref 3.5–4.5)
POTASSIUM BLDA-SCNC: 4.5 MMOL/L (ref 3.5–4.9)
POTASSIUM BLDA-SCNC: 4.7 MMOL/L (ref 3.5–4.5)
POTASSIUM BLDA-SCNC: 4.7 MMOL/L (ref 3.5–4.5)
POTASSIUM BLDA-SCNC: 4.7 MMOL/L (ref 3.5–4.9)
POTASSIUM BLDA-SCNC: 5 MMOL/L (ref 3.5–4.9)
POTASSIUM SERPL-SCNC: 4.6 MMOL/L (ref 3.5–5.2)
POTASSIUM SERPL-SCNC: 4.7 MMOL/L (ref 3.5–5.2)
PROTHROMBIN TIME: 13 SECONDS (ref 9.6–11.7)
PSV: 10 CMH2O
QT INTERVAL: 448 MS
RBC # BLD AUTO: 2.58 10*6/MM3 (ref 4.14–5.8)
RBC # BLD AUTO: 2.77 10*6/MM3 (ref 4.14–5.8)
RESPIRATORY RATE: 14
RESPIRATORY RATE: 16
RESPIRATORY RATE: 16
SAO2 % BLDCOA: 100 % (ref 95–98)
SAO2 % BLDCOA: 94.3 % (ref 94–98)
SAO2 % BLDCOA: 96.6 % (ref 94–98)
SAO2 % BLDCOA: 96.9 % (ref 94–98)
SAO2 % BLDCOA: 97.4 % (ref 94–98)
SAO2 % BLDCOA: 98 % (ref 95–98)
SAO2 % BLDCOA: 98.6 % (ref 94–98)
SAO2 % BLDCOA: 99 % (ref 95–98)
SAO2 % BLDCOV: 32 % (ref 95–99)
SODIUM BLD-SCNC: 133 MMOL/L (ref 138–146)
SODIUM BLD-SCNC: 136 MMOL/L (ref 138–146)
SODIUM BLD-SCNC: 136 MMOL/L (ref 138–146)
SODIUM BLD-SCNC: 137 MMOL/L (ref 138–146)
SODIUM BLD-SCNC: 137 MMOL/L (ref 138–146)
SODIUM BLD-SCNC: 138 MMOL/L (ref 138–146)
SODIUM BLD-SCNC: 138 MMOL/L (ref 138–146)
SODIUM BLD-SCNC: 139 MMOL/L (ref 138–146)
SODIUM BLD-SCNC: 139 MMOL/L (ref 138–146)
SODIUM BLD-SCNC: 140 MMOL/L (ref 138–146)
SODIUM BLD-SCNC: 141 MMOL/L (ref 138–146)
SODIUM SERPL-SCNC: 138 MMOL/L (ref 136–145)
SODIUM SERPL-SCNC: 139 MMOL/L (ref 136–145)
UNIT  ABO: NORMAL
UNIT  RH: NORMAL
VENTILATOR MODE: ABNORMAL
VT ON VENT VENT: 700 ML
WBC # BLD AUTO: 8 10*3/MM3 (ref 3.4–10.8)
WBC # BLD AUTO: 8.1 10*3/MM3 (ref 3.4–10.8)

## 2021-08-05 PROCEDURE — P9041 ALBUMIN (HUMAN),5%, 50ML: HCPCS

## 2021-08-05 PROCEDURE — 25010000002 ALBUMIN HUMAN 5% PER 50 ML: Performed by: ANESTHESIOLOGY

## 2021-08-05 PROCEDURE — 82330 ASSAY OF CALCIUM: CPT

## 2021-08-05 PROCEDURE — 63710000001 INSULIN REGULAR HUMAN PER 5 UNITS: Performed by: ANESTHESIOLOGY

## 2021-08-05 PROCEDURE — 25010000002 PROTAMINE SULFATE PER 10 MG: Performed by: ANESTHESIOLOGY

## 2021-08-05 PROCEDURE — 25010000002 FENTANYL CITRATE (PF) 250 MCG/5ML SOLUTION: Performed by: ANESTHESIOLOGY

## 2021-08-05 PROCEDURE — 85027 COMPLETE CBC AUTOMATED: CPT | Performed by: NURSE PRACTITIONER

## 2021-08-05 PROCEDURE — C1889 IMPLANT/INSERT DEVICE, NOC: HCPCS | Performed by: THORACIC SURGERY (CARDIOTHORACIC VASCULAR SURGERY)

## 2021-08-05 PROCEDURE — C1729 CATH, DRAINAGE: HCPCS | Performed by: THORACIC SURGERY (CARDIOTHORACIC VASCULAR SURGERY)

## 2021-08-05 PROCEDURE — 25010000002 ONDANSETRON PER 1 MG: Performed by: ANESTHESIOLOGY

## 2021-08-05 PROCEDURE — 94799 UNLISTED PULMONARY SVC/PX: CPT

## 2021-08-05 PROCEDURE — 33405 REPLACEMENT AORTIC VALVE OPN: CPT | Performed by: SPECIALIST/TECHNOLOGIST, OTHER

## 2021-08-05 PROCEDURE — 93010 ELECTROCARDIOGRAM REPORT: CPT | Performed by: INTERNAL MEDICINE

## 2021-08-05 PROCEDURE — C1751 CATH, INF, PER/CENT/MIDLINE: HCPCS | Performed by: ANESTHESIOLOGY

## 2021-08-05 PROCEDURE — P9041 ALBUMIN (HUMAN),5%, 50ML: HCPCS | Performed by: NURSE PRACTITIONER

## 2021-08-05 PROCEDURE — 02RF08Z REPLACEMENT OF AORTIC VALVE WITH ZOOPLASTIC TISSUE, OPEN APPROACH: ICD-10-PCS | Performed by: THORACIC SURGERY (CARDIOTHORACIC VASCULAR SURGERY)

## 2021-08-05 PROCEDURE — 80069 RENAL FUNCTION PANEL: CPT | Performed by: NURSE PRACTITIONER

## 2021-08-05 PROCEDURE — 33405 REPLACEMENT AORTIC VALVE OPN: CPT | Performed by: THORACIC SURGERY (CARDIOTHORACIC VASCULAR SURGERY)

## 2021-08-05 PROCEDURE — 82947 ASSAY GLUCOSE BLOOD QUANT: CPT

## 2021-08-05 PROCEDURE — 25010000002 PROPOFOL 200 MG/20ML EMULSION: Performed by: ANESTHESIOLOGY

## 2021-08-05 PROCEDURE — 82803 BLOOD GASES ANY COMBINATION: CPT

## 2021-08-05 PROCEDURE — 25010000002 HEPARIN (PORCINE) PER 1000 UNITS: Performed by: ANESTHESIOLOGY

## 2021-08-05 PROCEDURE — 25010000002 METOCLOPRAMIDE PER 10 MG: Performed by: THORACIC SURGERY (CARDIOTHORACIC VASCULAR SURGERY)

## 2021-08-05 PROCEDURE — 82962 GLUCOSE BLOOD TEST: CPT

## 2021-08-05 PROCEDURE — 25010000002 ONDANSETRON PER 1 MG: Performed by: NURSE PRACTITIONER

## 2021-08-05 PROCEDURE — 85018 HEMOGLOBIN: CPT

## 2021-08-05 PROCEDURE — 25010000002 MAGNESIUM SULFATE PER 500 MG OF MAGNESIUM: Performed by: ANESTHESIOLOGY

## 2021-08-05 PROCEDURE — 25010000002 HEPARIN (PORCINE) PER 1000 UNITS: Performed by: THORACIC SURGERY (CARDIOTHORACIC VASCULAR SURGERY)

## 2021-08-05 PROCEDURE — 85025 COMPLETE CBC W/AUTO DIFF WBC: CPT | Performed by: NURSE PRACTITIONER

## 2021-08-05 PROCEDURE — 25010000002 MAGNESIUM SULFATE 2 GM/50ML SOLUTION: Performed by: NURSE PRACTITIONER

## 2021-08-05 PROCEDURE — 85347 COAGULATION TIME ACTIVATED: CPT

## 2021-08-05 PROCEDURE — 94002 VENT MGMT INPAT INIT DAY: CPT

## 2021-08-05 PROCEDURE — 80051 ELECTROLYTE PANEL: CPT

## 2021-08-05 PROCEDURE — B24BZZ4 ULTRASONOGRAPHY OF HEART WITH AORTA, TRANSESOPHAGEAL: ICD-10-PCS | Performed by: ANESTHESIOLOGY

## 2021-08-05 PROCEDURE — 71045 X-RAY EXAM CHEST 1 VIEW: CPT

## 2021-08-05 PROCEDURE — 85014 HEMATOCRIT: CPT

## 2021-08-05 PROCEDURE — 82330 ASSAY OF CALCIUM: CPT | Performed by: NURSE PRACTITIONER

## 2021-08-05 PROCEDURE — 25010000002 ALBUMIN HUMAN 5% PER 50 ML

## 2021-08-05 PROCEDURE — 93005 ELECTROCARDIOGRAM TRACING: CPT | Performed by: NURSE PRACTITIONER

## 2021-08-05 PROCEDURE — 25010000003 CEFAZOLIN PER 500 MG: Performed by: ANESTHESIOLOGY

## 2021-08-05 PROCEDURE — 85384 FIBRINOGEN ACTIVITY: CPT | Performed by: NURSE PRACTITIONER

## 2021-08-05 PROCEDURE — 86923 COMPATIBILITY TEST ELECTRIC: CPT

## 2021-08-05 PROCEDURE — 85610 PROTHROMBIN TIME: CPT | Performed by: NURSE PRACTITIONER

## 2021-08-05 PROCEDURE — 84295 ASSAY OF SERUM SODIUM: CPT

## 2021-08-05 PROCEDURE — 25010000002 MORPHINE PER 10 MG: Performed by: NURSE PRACTITIONER

## 2021-08-05 PROCEDURE — C1713 ANCHOR/SCREW BN/BN,TIS/BN: HCPCS | Performed by: THORACIC SURGERY (CARDIOTHORACIC VASCULAR SURGERY)

## 2021-08-05 PROCEDURE — 25010000002 CEFAZOLIN PER 500 MG: Performed by: NURSE PRACTITIONER

## 2021-08-05 PROCEDURE — 25010000002 CALCIUM GLUCONATE PER 10 ML: Performed by: ANESTHESIOLOGY

## 2021-08-05 PROCEDURE — 25010000002 MIDAZOLAM PER 1 MG: Performed by: ANESTHESIOLOGY

## 2021-08-05 PROCEDURE — P9041 ALBUMIN (HUMAN),5%, 50ML: HCPCS | Performed by: ANESTHESIOLOGY

## 2021-08-05 PROCEDURE — 85730 THROMBOPLASTIN TIME PARTIAL: CPT | Performed by: NURSE PRACTITIONER

## 2021-08-05 PROCEDURE — 83735 ASSAY OF MAGNESIUM: CPT | Performed by: NURSE PRACTITIONER

## 2021-08-05 PROCEDURE — 88305 TISSUE EXAM BY PATHOLOGIST: CPT | Performed by: THORACIC SURGERY (CARDIOTHORACIC VASCULAR SURGERY)

## 2021-08-05 PROCEDURE — 84132 ASSAY OF SERUM POTASSIUM: CPT

## 2021-08-05 PROCEDURE — 25010000002 ALBUMIN HUMAN 5% PER 50 ML: Performed by: NURSE PRACTITIONER

## 2021-08-05 PROCEDURE — 5A1221Z PERFORMANCE OF CARDIAC OUTPUT, CONTINUOUS: ICD-10-PCS | Performed by: THORACIC SURGERY (CARDIOTHORACIC VASCULAR SURGERY)

## 2021-08-05 PROCEDURE — 88311 DECALCIFY TISSUE: CPT | Performed by: THORACIC SURGERY (CARDIOTHORACIC VASCULAR SURGERY)

## 2021-08-05 DEVICE — WAX,BONE,NATURAL
Type: IMPLANTABLE DEVICE | Site: STERNUM | Status: FUNCTIONAL
Brand: MEDLINE INDUSTRIES

## 2021-08-05 DEVICE — INCISIONLINE PLEDGET TFLN SFT LG: Type: IMPLANTABLE DEVICE | Site: CHEST | Status: FUNCTIONAL

## 2021-08-05 DEVICE — VLV PERICARD PERIMOUNT MAGNA EASE 25: Type: IMPLANTABLE DEVICE | Site: HEART | Status: FUNCTIONAL

## 2021-08-05 DEVICE — SS SUTURE, 3 PER SLEEVE
Type: IMPLANTABLE DEVICE | Site: STERNUM | Status: FUNCTIONAL
Brand: MYO/WIRE II

## 2021-08-05 DEVICE — DEV CONTRL TISS STRATAFIX SPIRAL MNCRYL UD 3/0 PLS 30CM: Type: IMPLANTABLE DEVICE | Site: CHEST | Status: FUNCTIONAL

## 2021-08-05 DEVICE — SS SUTURE, 6 PER SLEEVE
Type: IMPLANTABLE DEVICE | Site: STERNUM | Status: FUNCTIONAL
Brand: MYO/WIRE II

## 2021-08-05 DEVICE — ABSORBABLE HEMOSTAT (OXIDIZED REGENERATED CELLULOSE, U.S.P.)
Type: IMPLANTABLE DEVICE | Site: CHEST | Status: FUNCTIONAL
Brand: SURGICEL

## 2021-08-05 RX ORDER — METOCLOPRAMIDE HYDROCHLORIDE 5 MG/ML
10 INJECTION INTRAMUSCULAR; INTRAVENOUS EVERY 6 HOURS
Status: DISCONTINUED | OUTPATIENT
Start: 2021-08-05 | End: 2021-08-05

## 2021-08-05 RX ORDER — GABAPENTIN 100 MG/1
100 CAPSULE ORAL 3 TIMES DAILY
Status: DISCONTINUED | OUTPATIENT
Start: 2021-08-05 | End: 2021-08-05

## 2021-08-05 RX ORDER — CHLORHEXIDINE GLUCONATE 0.12 MG/ML
15 RINSE ORAL EVERY 12 HOURS
Status: DISCONTINUED | OUTPATIENT
Start: 2021-08-05 | End: 2021-08-10 | Stop reason: HOSPADM

## 2021-08-05 RX ORDER — ONDANSETRON 2 MG/ML
4 INJECTION INTRAMUSCULAR; INTRAVENOUS EVERY 6 HOURS PRN
Status: DISCONTINUED | OUTPATIENT
Start: 2021-08-05 | End: 2021-08-10 | Stop reason: HOSPADM

## 2021-08-05 RX ORDER — SODIUM CHLORIDE 0.9 % (FLUSH) 0.9 %
30 SYRINGE (ML) INJECTION ONCE AS NEEDED
Status: COMPLETED | OUTPATIENT
Start: 2021-08-05 | End: 2021-08-05

## 2021-08-05 RX ORDER — SODIUM CHLORIDE, SODIUM LACTATE, POTASSIUM CHLORIDE, CALCIUM CHLORIDE 600; 310; 30; 20 MG/100ML; MG/100ML; MG/100ML; MG/100ML
9 INJECTION, SOLUTION INTRAVENOUS CONTINUOUS PRN
Status: DISCONTINUED | OUTPATIENT
Start: 2021-08-05 | End: 2021-08-05

## 2021-08-05 RX ORDER — ALBUMIN, HUMAN INJ 5% 5 %
SOLUTION INTRAVENOUS
Status: COMPLETED
Start: 2021-08-05 | End: 2021-08-05

## 2021-08-05 RX ORDER — MORPHINE SULFATE 4 MG/ML
1 INJECTION, SOLUTION INTRAMUSCULAR; INTRAVENOUS EVERY 4 HOURS PRN
Status: DISCONTINUED | OUTPATIENT
Start: 2021-08-05 | End: 2021-08-05

## 2021-08-05 RX ORDER — SODIUM CHLORIDE 0.9 % (FLUSH) 0.9 %
30 SYRINGE (ML) INJECTION ONCE AS NEEDED
Status: DISCONTINUED | OUTPATIENT
Start: 2021-08-05 | End: 2021-08-05 | Stop reason: HOSPADM

## 2021-08-05 RX ORDER — MEPERIDINE HYDROCHLORIDE 25 MG/ML
25 INJECTION INTRAMUSCULAR; INTRAVENOUS; SUBCUTANEOUS EVERY 4 HOURS PRN
Status: ACTIVE | OUTPATIENT
Start: 2021-08-05 | End: 2021-08-05

## 2021-08-05 RX ORDER — ALPRAZOLAM 0.25 MG/1
0.25 TABLET ORAL ONCE
Status: COMPLETED | OUTPATIENT
Start: 2021-08-05 | End: 2021-08-05

## 2021-08-05 RX ORDER — ONDANSETRON 2 MG/ML
INJECTION INTRAMUSCULAR; INTRAVENOUS AS NEEDED
Status: DISCONTINUED | OUTPATIENT
Start: 2021-08-05 | End: 2021-08-05 | Stop reason: SURG

## 2021-08-05 RX ORDER — CHLORHEXIDINE GLUCONATE 500 MG/1
1 CLOTH TOPICAL EVERY 12 HOURS PRN
Status: DISCONTINUED | OUTPATIENT
Start: 2021-08-05 | End: 2021-08-05 | Stop reason: HOSPADM

## 2021-08-05 RX ORDER — MORPHINE SULFATE 4 MG/ML
4 INJECTION, SOLUTION INTRAMUSCULAR; INTRAVENOUS
Status: DISCONTINUED | OUTPATIENT
Start: 2021-08-05 | End: 2021-08-05

## 2021-08-05 RX ORDER — ACETAMINOPHEN 325 MG/1
650 TABLET ORAL EVERY 4 HOURS PRN
Status: DISCONTINUED | OUTPATIENT
Start: 2021-08-06 | End: 2021-08-10 | Stop reason: HOSPADM

## 2021-08-05 RX ORDER — DEXMEDETOMIDINE HYDROCHLORIDE 4 UG/ML
.2-1.5 INJECTION, SOLUTION INTRAVENOUS
Status: DISCONTINUED | OUTPATIENT
Start: 2021-08-05 | End: 2021-08-09

## 2021-08-05 RX ORDER — MAGNESIUM HYDROXIDE 1200 MG/15ML
LIQUID ORAL AS NEEDED
Status: DISCONTINUED | OUTPATIENT
Start: 2021-08-05 | End: 2021-08-05 | Stop reason: HOSPADM

## 2021-08-05 RX ORDER — PANTOPRAZOLE SODIUM 40 MG/1
40 TABLET, DELAYED RELEASE ORAL EVERY MORNING
Status: COMPLETED | OUTPATIENT
Start: 2021-08-06 | End: 2021-08-08

## 2021-08-05 RX ORDER — MORPHINE SULFATE 4 MG/ML
2 INJECTION, SOLUTION INTRAMUSCULAR; INTRAVENOUS
Status: DISCONTINUED | OUTPATIENT
Start: 2021-08-05 | End: 2021-08-09

## 2021-08-05 RX ORDER — CEFAZOLIN SODIUM 1 G/3ML
INJECTION, POWDER, FOR SOLUTION INTRAMUSCULAR; INTRAVENOUS AS NEEDED
Status: DISCONTINUED | OUTPATIENT
Start: 2021-08-05 | End: 2021-08-05 | Stop reason: SURG

## 2021-08-05 RX ORDER — POTASSIUM CHLORIDE 7.45 MG/ML
10 INJECTION INTRAVENOUS
Status: DISCONTINUED | OUTPATIENT
Start: 2021-08-05 | End: 2021-08-10 | Stop reason: HOSPADM

## 2021-08-05 RX ORDER — SODIUM CHLORIDE 9 MG/ML
30 INJECTION, SOLUTION INTRAVENOUS CONTINUOUS PRN
Status: DISCONTINUED | OUTPATIENT
Start: 2021-08-05 | End: 2021-08-05

## 2021-08-05 RX ORDER — CALCIUM GLUCONATE 94 MG/ML
INJECTION, SOLUTION INTRAVENOUS AS NEEDED
Status: DISCONTINUED | OUTPATIENT
Start: 2021-08-05 | End: 2021-08-05 | Stop reason: SURG

## 2021-08-05 RX ORDER — METOCLOPRAMIDE HYDROCHLORIDE 5 MG/ML
10 INJECTION INTRAMUSCULAR; INTRAVENOUS EVERY 6 HOURS
Status: DISCONTINUED | OUTPATIENT
Start: 2021-08-05 | End: 2021-08-10 | Stop reason: HOSPADM

## 2021-08-05 RX ORDER — NITROGLYCERIN 20 MG/100ML
5-200 INJECTION INTRAVENOUS
Status: DISCONTINUED | OUTPATIENT
Start: 2021-08-05 | End: 2021-08-09

## 2021-08-05 RX ORDER — BISACODYL 5 MG/1
10 TABLET, DELAYED RELEASE ORAL DAILY PRN
Status: DISCONTINUED | OUTPATIENT
Start: 2021-08-05 | End: 2021-08-10 | Stop reason: HOSPADM

## 2021-08-05 RX ORDER — AMOXICILLIN 250 MG
2 CAPSULE ORAL 2 TIMES DAILY
Status: DISCONTINUED | OUTPATIENT
Start: 2021-08-06 | End: 2021-08-10 | Stop reason: HOSPADM

## 2021-08-05 RX ORDER — ACETAMINOPHEN 160 MG/5ML
650 SOLUTION ORAL EVERY 4 HOURS PRN
Status: DISCONTINUED | OUTPATIENT
Start: 2021-08-06 | End: 2021-08-10 | Stop reason: HOSPADM

## 2021-08-05 RX ORDER — HYDROCODONE BITARTRATE AND ACETAMINOPHEN 5; 325 MG/1; MG/1
1 TABLET ORAL EVERY 4 HOURS PRN
Status: DISCONTINUED | OUTPATIENT
Start: 2021-08-05 | End: 2021-08-10 | Stop reason: HOSPADM

## 2021-08-05 RX ORDER — ACETAMINOPHEN 160 MG/5ML
650 SOLUTION ORAL EVERY 4 HOURS
Status: DISCONTINUED | OUTPATIENT
Start: 2021-08-05 | End: 2021-08-05

## 2021-08-05 RX ORDER — SODIUM CHLORIDE 9 MG/ML
30 INJECTION, SOLUTION INTRAVENOUS CONTINUOUS PRN
Status: DISPENSED | OUTPATIENT
Start: 2021-08-05 | End: 2021-08-08

## 2021-08-05 RX ORDER — ALBUMIN, HUMAN INJ 5% 5 %
1000 SOLUTION INTRAVENOUS AS NEEDED
Status: ACTIVE | OUTPATIENT
Start: 2021-08-05 | End: 2021-08-06

## 2021-08-05 RX ORDER — ACETAMINOPHEN 650 MG/1
650 SUPPOSITORY RECTAL EVERY 4 HOURS
Status: DISCONTINUED | OUTPATIENT
Start: 2021-08-05 | End: 2021-08-05

## 2021-08-05 RX ORDER — ASPIRIN 81 MG/1
81 TABLET ORAL DAILY
Status: DISCONTINUED | OUTPATIENT
Start: 2021-08-06 | End: 2021-08-10 | Stop reason: HOSPADM

## 2021-08-05 RX ORDER — POTASSIUM CHLORIDE 7.45 MG/ML
INJECTION INTRAVENOUS
Status: DISPENSED
Start: 2021-08-05 | End: 2021-08-05

## 2021-08-05 RX ORDER — SODIUM CHLORIDE 0.9 % (FLUSH) 0.9 %
10 SYRINGE (ML) INJECTION EVERY 12 HOURS SCHEDULED
Status: DISCONTINUED | OUTPATIENT
Start: 2021-08-05 | End: 2021-08-05 | Stop reason: HOSPADM

## 2021-08-05 RX ORDER — ACETAMINOPHEN 160 MG/5ML
650 SOLUTION ORAL EVERY 4 HOURS
Status: COMPLETED | OUTPATIENT
Start: 2021-08-05 | End: 2021-08-06

## 2021-08-05 RX ORDER — NALOXONE HCL 0.4 MG/ML
0.4 VIAL (ML) INJECTION
Status: DISCONTINUED | OUTPATIENT
Start: 2021-08-05 | End: 2021-08-05

## 2021-08-05 RX ORDER — SODIUM CHLORIDE 0.9 % (FLUSH) 0.9 %
3-10 SYRINGE (ML) INJECTION AS NEEDED
Status: DISCONTINUED | OUTPATIENT
Start: 2021-08-05 | End: 2021-08-05 | Stop reason: HOSPADM

## 2021-08-05 RX ORDER — AMOXICILLIN 250 MG
2 CAPSULE ORAL 2 TIMES DAILY
Status: DISCONTINUED | OUTPATIENT
Start: 2021-08-05 | End: 2021-08-05

## 2021-08-05 RX ORDER — HEPARIN SODIUM 1000 [USP'U]/ML
INJECTION, SOLUTION INTRAVENOUS; SUBCUTANEOUS AS NEEDED
Status: DISCONTINUED | OUTPATIENT
Start: 2021-08-05 | End: 2021-08-05 | Stop reason: SURG

## 2021-08-05 RX ORDER — HYDROCODONE BITARTRATE AND ACETAMINOPHEN 10; 325 MG/1; MG/1
1 TABLET ORAL EVERY 4 HOURS PRN
Status: DISCONTINUED | OUTPATIENT
Start: 2021-08-05 | End: 2021-08-10 | Stop reason: HOSPADM

## 2021-08-05 RX ORDER — POLYETHYLENE GLYCOL 3350 17 G/17G
17 POWDER, FOR SOLUTION ORAL DAILY PRN
Status: DISCONTINUED | OUTPATIENT
Start: 2021-08-05 | End: 2021-08-10 | Stop reason: HOSPADM

## 2021-08-05 RX ORDER — SODIUM CHLORIDE 0.9 % (FLUSH) 0.9 %
10 SYRINGE (ML) INJECTION AS NEEDED
Status: DISCONTINUED | OUTPATIENT
Start: 2021-08-05 | End: 2021-08-05 | Stop reason: HOSPADM

## 2021-08-05 RX ORDER — NITROGLYCERIN 0.4 MG/1
0.4 TABLET SUBLINGUAL
Status: DISCONTINUED | OUTPATIENT
Start: 2021-08-05 | End: 2021-08-05 | Stop reason: HOSPADM

## 2021-08-05 RX ORDER — ACETAMINOPHEN 650 MG/1
650 SUPPOSITORY RECTAL EVERY 4 HOURS
Status: COMPLETED | OUTPATIENT
Start: 2021-08-05 | End: 2021-08-06

## 2021-08-05 RX ORDER — SODIUM CHLORIDE 0.9 % (FLUSH) 0.9 %
3 SYRINGE (ML) INJECTION EVERY 12 HOURS SCHEDULED
Status: DISCONTINUED | OUTPATIENT
Start: 2021-08-05 | End: 2021-08-05 | Stop reason: HOSPADM

## 2021-08-05 RX ORDER — MIDAZOLAM HYDROCHLORIDE 1 MG/ML
INJECTION INTRAMUSCULAR; INTRAVENOUS AS NEEDED
Status: DISCONTINUED | OUTPATIENT
Start: 2021-08-05 | End: 2021-08-05 | Stop reason: SURG

## 2021-08-05 RX ORDER — ASPIRIN 325 MG
325 TABLET ORAL ONCE
Status: DISCONTINUED | OUTPATIENT
Start: 2021-08-05 | End: 2021-08-10 | Stop reason: HOSPADM

## 2021-08-05 RX ORDER — ACETAMINOPHEN 650 MG
TABLET, EXTENDED RELEASE ORAL AS NEEDED
Status: DISCONTINUED | OUTPATIENT
Start: 2021-08-05 | End: 2021-08-05 | Stop reason: HOSPADM

## 2021-08-05 RX ORDER — ALBUMIN, HUMAN INJ 5% 5 %
SOLUTION INTRAVENOUS CONTINUOUS PRN
Status: DISCONTINUED | OUTPATIENT
Start: 2021-08-05 | End: 2021-08-05 | Stop reason: SURG

## 2021-08-05 RX ORDER — SODIUM CHLORIDE 9 MG/ML
INJECTION, SOLUTION INTRAVENOUS CONTINUOUS PRN
Status: DISCONTINUED | OUTPATIENT
Start: 2021-08-05 | End: 2021-08-05 | Stop reason: SURG

## 2021-08-05 RX ORDER — FENTANYL CITRATE 50 UG/ML
INJECTION, SOLUTION INTRAMUSCULAR; INTRAVENOUS AS NEEDED
Status: DISCONTINUED | OUTPATIENT
Start: 2021-08-05 | End: 2021-08-05 | Stop reason: SURG

## 2021-08-05 RX ORDER — ACETAMINOPHEN 325 MG/1
650 TABLET ORAL EVERY 4 HOURS
Status: DISCONTINUED | OUTPATIENT
Start: 2021-08-05 | End: 2021-08-05

## 2021-08-05 RX ORDER — CHLORHEXIDINE GLUCONATE 0.12 MG/ML
15 RINSE ORAL ONCE
Status: DISCONTINUED | OUTPATIENT
Start: 2021-08-05 | End: 2021-08-05 | Stop reason: HOSPADM

## 2021-08-05 RX ORDER — AMINOCAPROIC ACID 250 MG/ML
INJECTION, SOLUTION INTRAVENOUS AS NEEDED
Status: DISCONTINUED | OUTPATIENT
Start: 2021-08-05 | End: 2021-08-05 | Stop reason: SURG

## 2021-08-05 RX ORDER — NOREPINEPHRINE BIT/0.9 % NACL 8 MG/250ML
INFUSION BOTTLE (ML) INTRAVENOUS CONTINUOUS PRN
Status: DISCONTINUED | OUTPATIENT
Start: 2021-08-05 | End: 2021-08-05 | Stop reason: SURG

## 2021-08-05 RX ORDER — ACETAMINOPHEN 325 MG/1
650 TABLET ORAL EVERY 4 HOURS PRN
Status: DISCONTINUED | OUTPATIENT
Start: 2021-08-05 | End: 2021-08-05 | Stop reason: HOSPADM

## 2021-08-05 RX ORDER — NALOXONE HCL 0.4 MG/ML
0.4 VIAL (ML) INJECTION
Status: DISCONTINUED | OUTPATIENT
Start: 2021-08-05 | End: 2021-08-10 | Stop reason: HOSPADM

## 2021-08-05 RX ORDER — KETAMINE HYDROCHLORIDE 10 MG/ML
INJECTION INTRAMUSCULAR; INTRAVENOUS AS NEEDED
Status: DISCONTINUED | OUTPATIENT
Start: 2021-08-05 | End: 2021-08-05 | Stop reason: SURG

## 2021-08-05 RX ORDER — ACETAMINOPHEN 325 MG/1
650 TABLET ORAL EVERY 4 HOURS
Status: COMPLETED | OUTPATIENT
Start: 2021-08-05 | End: 2021-08-06

## 2021-08-05 RX ORDER — VECURONIUM BROMIDE 1 MG/ML
INJECTION, POWDER, LYOPHILIZED, FOR SOLUTION INTRAVENOUS AS NEEDED
Status: DISCONTINUED | OUTPATIENT
Start: 2021-08-05 | End: 2021-08-05 | Stop reason: SURG

## 2021-08-05 RX ORDER — MAGNESIUM SULFATE HEPTAHYDRATE 40 MG/ML
2 INJECTION, SOLUTION INTRAVENOUS EVERY 8 HOURS
Status: DISPENSED | OUTPATIENT
Start: 2021-08-05 | End: 2021-08-06

## 2021-08-05 RX ORDER — BISACODYL 10 MG
10 SUPPOSITORY, RECTAL RECTAL DAILY PRN
Status: DISCONTINUED | OUTPATIENT
Start: 2021-08-06 | End: 2021-08-10 | Stop reason: HOSPADM

## 2021-08-05 RX ORDER — ACETAMINOPHEN 650 MG/1
650 SUPPOSITORY RECTAL EVERY 4 HOURS PRN
Status: DISCONTINUED | OUTPATIENT
Start: 2021-08-06 | End: 2021-08-10 | Stop reason: HOSPADM

## 2021-08-05 RX ORDER — GABAPENTIN 100 MG/1
100 CAPSULE ORAL 3 TIMES DAILY
Status: DISCONTINUED | OUTPATIENT
Start: 2021-08-05 | End: 2021-08-10 | Stop reason: HOSPADM

## 2021-08-05 RX ORDER — PROPOFOL 10 MG/ML
INJECTION, EMULSION INTRAVENOUS AS NEEDED
Status: DISCONTINUED | OUTPATIENT
Start: 2021-08-05 | End: 2021-08-05 | Stop reason: SURG

## 2021-08-05 RX ORDER — PANTOPRAZOLE SODIUM 40 MG/10ML
40 INJECTION, POWDER, LYOPHILIZED, FOR SOLUTION INTRAVENOUS ONCE
Status: COMPLETED | OUTPATIENT
Start: 2021-08-05 | End: 2021-08-05

## 2021-08-05 RX ORDER — ATORVASTATIN CALCIUM 40 MG/1
40 TABLET, FILM COATED ORAL NIGHTLY
Status: DISCONTINUED | OUTPATIENT
Start: 2021-08-05 | End: 2021-08-06 | Stop reason: SDUPTHER

## 2021-08-05 RX ADMIN — ALPRAZOLAM 0.25 MG: 0.25 TABLET ORAL at 05:56

## 2021-08-05 RX ADMIN — FENTANYL CITRATE 250 MCG: 0.05 INJECTION, SOLUTION INTRAMUSCULAR; INTRAVENOUS at 08:03

## 2021-08-05 RX ADMIN — MORPHINE SULFATE 1 MG: 4 INJECTION INTRAVENOUS at 17:21

## 2021-08-05 RX ADMIN — ONDANSETRON 4 MG: 2 INJECTION INTRAMUSCULAR; INTRAVENOUS at 17:17

## 2021-08-05 RX ADMIN — CEFAZOLIN SODIUM 2 G: 1 INJECTION, POWDER, FOR SOLUTION INTRAMUSCULAR; INTRAVENOUS at 07:14

## 2021-08-05 RX ADMIN — MIDAZOLAM 2 MG: 1 INJECTION INTRAMUSCULAR; INTRAVENOUS at 06:40

## 2021-08-05 RX ADMIN — ALBUMIN HUMAN: 0.05 INJECTION, SOLUTION INTRAVENOUS at 09:20

## 2021-08-05 RX ADMIN — SODIUM CHLORIDE 4 UNITS/HR: 900 INJECTION INTRAVENOUS at 08:02

## 2021-08-05 RX ADMIN — MIDAZOLAM 4 MG: 1 INJECTION INTRAMUSCULAR; INTRAVENOUS at 09:06

## 2021-08-05 RX ADMIN — CALCIUM GLUCONATE 1 G: 98 INJECTION, SOLUTION INTRAVENOUS at 09:15

## 2021-08-05 RX ADMIN — ATORVASTATIN CALCIUM 40 MG: 40 TABLET, FILM COATED ORAL at 20:55

## 2021-08-05 RX ADMIN — DEXMEDETOMIDINE HYDROCHLORIDE 0.5 MCG/KG/HR: 4 INJECTION, SOLUTION INTRAVENOUS at 11:11

## 2021-08-05 RX ADMIN — KETAMINE HYDROCHLORIDE 20 MG: 10 INJECTION, SOLUTION INTRAMUSCULAR; INTRAVENOUS at 06:53

## 2021-08-05 RX ADMIN — DEXMEDETOMIDINE HYDROCHLORIDE 0.5 MCG/KG/HR: 100 INJECTION, SOLUTION INTRAVENOUS at 07:10

## 2021-08-05 RX ADMIN — AMINOCAPROIC ACID 10 G: 250 INJECTION, SOLUTION INTRAVENOUS at 07:11

## 2021-08-05 RX ADMIN — MAGNESIUM SULFATE HEPTAHYDRATE 2 G: 2 INJECTION, SOLUTION INTRAVENOUS at 17:18

## 2021-08-05 RX ADMIN — FENTANYL CITRATE 250 MCG: 0.05 INJECTION, SOLUTION INTRAMUSCULAR; INTRAVENOUS at 09:06

## 2021-08-05 RX ADMIN — PROTAMINE SULFATE 250 MG: 10 INJECTION, SOLUTION INTRAVENOUS at 09:13

## 2021-08-05 RX ADMIN — FENTANYL CITRATE 250 MCG: 0.05 INJECTION, SOLUTION INTRAMUSCULAR; INTRAVENOUS at 09:20

## 2021-08-05 RX ADMIN — ALBUMIN HUMAN 250 ML: 0.05 INJECTION, SOLUTION INTRAVENOUS at 16:30

## 2021-08-05 RX ADMIN — ACETAMINOPHEN 650 MG: 325 TABLET, FILM COATED ORAL at 20:41

## 2021-08-05 RX ADMIN — MIDAZOLAM 2 MG: 1 INJECTION INTRAMUSCULAR; INTRAVENOUS at 08:03

## 2021-08-05 RX ADMIN — MAGNESIUM SULFATE HEPTAHYDRATE 2 G: 500 INJECTION, SOLUTION INTRAMUSCULAR; INTRAVENOUS at 09:01

## 2021-08-05 RX ADMIN — MIDAZOLAM 2 MG: 1 INJECTION INTRAMUSCULAR; INTRAVENOUS at 06:42

## 2021-08-05 RX ADMIN — SODIUM CHLORIDE 2.5 MG/HR: 0.9 INJECTION, SOLUTION INTRAVENOUS at 11:14

## 2021-08-05 RX ADMIN — CEFAZOLIN SODIUM 2 G: 1 INJECTION, POWDER, FOR SOLUTION INTRAMUSCULAR; INTRAVENOUS at 09:46

## 2021-08-05 RX ADMIN — SODIUM CHLORIDE: 0.9 INJECTION, SOLUTION INTRAVENOUS at 06:40

## 2021-08-05 RX ADMIN — VECURONIUM BROMIDE 5 MG: 10 INJECTION, POWDER, LYOPHILIZED, FOR SOLUTION INTRAVENOUS at 08:04

## 2021-08-05 RX ADMIN — Medication 30 ML: at 20:42

## 2021-08-05 RX ADMIN — PROPOFOL 100 MG: 10 INJECTION, EMULSION INTRAVENOUS at 06:53

## 2021-08-05 RX ADMIN — ONDANSETRON 4 MG: 2 INJECTION INTRAMUSCULAR; INTRAVENOUS at 09:46

## 2021-08-05 RX ADMIN — FENTANYL CITRATE 400 MCG: 0.05 INJECTION, SOLUTION INTRAMUSCULAR; INTRAVENOUS at 06:53

## 2021-08-05 RX ADMIN — AMINOCAPROIC ACID 10 G: 250 INJECTION, SOLUTION INTRAVENOUS at 09:45

## 2021-08-05 RX ADMIN — ALBUMIN HUMAN: 0.05 INJECTION, SOLUTION INTRAVENOUS at 09:34

## 2021-08-05 RX ADMIN — FENTANYL CITRATE 250 MCG: 0.05 INJECTION, SOLUTION INTRAMUSCULAR; INTRAVENOUS at 07:16

## 2021-08-05 RX ADMIN — ALBUMIN HUMAN 250 ML: 0.05 INJECTION, SOLUTION INTRAVENOUS at 13:03

## 2021-08-05 RX ADMIN — PANTOPRAZOLE SODIUM 40 MG: 40 INJECTION, POWDER, FOR SOLUTION INTRAVENOUS at 17:18

## 2021-08-05 RX ADMIN — MUPIROCIN: 20 OINTMENT TOPICAL at 20:42

## 2021-08-05 RX ADMIN — Medication 0.03 MCG/KG/MIN: at 09:39

## 2021-08-05 RX ADMIN — METOPROLOL TARTRATE 12.5 MG: 25 TABLET, FILM COATED ORAL at 05:56

## 2021-08-05 RX ADMIN — HYDROCODONE BITARTRATE AND ACETAMINOPHEN 1 TABLET: 10; 325 TABLET ORAL at 20:42

## 2021-08-05 RX ADMIN — VECURONIUM BROMIDE 5 MG: 10 INJECTION, POWDER, LYOPHILIZED, FOR SOLUTION INTRAVENOUS at 08:53

## 2021-08-05 RX ADMIN — MIDAZOLAM 2 MG: 1 INJECTION INTRAMUSCULAR; INTRAVENOUS at 08:30

## 2021-08-05 RX ADMIN — GABAPENTIN 100 MG: 100 CAPSULE ORAL at 20:41

## 2021-08-05 RX ADMIN — HEPARIN SODIUM 23000 UNITS: 1000 INJECTION, SOLUTION INTRAVENOUS; SUBCUTANEOUS at 07:51

## 2021-08-05 RX ADMIN — FENTANYL CITRATE 100 MCG: 0.05 INJECTION, SOLUTION INTRAMUSCULAR; INTRAVENOUS at 06:41

## 2021-08-05 RX ADMIN — VECURONIUM BROMIDE 10 MG: 10 INJECTION, POWDER, LYOPHILIZED, FOR SOLUTION INTRAVENOUS at 06:53

## 2021-08-05 RX ADMIN — CEFAZOLIN SODIUM 2 G: 10 INJECTION, POWDER, FOR SOLUTION INTRAVENOUS at 17:19

## 2021-08-05 RX ADMIN — KETAMINE HYDROCHLORIDE 30 MG: 10 INJECTION, SOLUTION INTRAMUSCULAR; INTRAVENOUS at 06:44

## 2021-08-05 RX ADMIN — CHLORHEXIDINE GLUCONATE 15 ML: 1.2 SOLUTION ORAL at 20:42

## 2021-08-05 RX ADMIN — METOCLOPRAMIDE 10 MG: 5 INJECTION, SOLUTION INTRAMUSCULAR; INTRAVENOUS at 20:41

## 2021-08-05 RX ADMIN — SODIUM CHLORIDE 0 UNITS/HR: 9 INJECTION, SOLUTION INTRAVENOUS at 11:12

## 2021-08-05 RX ADMIN — SODIUM CHLORIDE 2.5 MG/HR: 900 INJECTION, SOLUTION INTRAVENOUS at 10:02

## 2021-08-05 RX ADMIN — VECURONIUM BROMIDE 5 MG: 10 INJECTION, POWDER, LYOPHILIZED, FOR SOLUTION INTRAVENOUS at 07:34

## 2021-08-05 NOTE — OP NOTE
Operative Note    Date of Dictation: 08/05/21    Date of Procedure: Same    Referring Physician: Darian Fajardo M.D.    Preoperative diagnosis:   1.  Severe nonrheumatic aortic stenosis  2.  Dyspnea exertion  3.  History of CVA  4.  COPD    Postoperative diagnosis:   Same    Procedure:   1.  Elective minimally invasive (J upper mini sternotomy ) AVR with a 25 mm magna pericardial prosthesis    Surgeon: Nael Padilla MD     Assistants: JOSÉ Moreno Assistant: Sarahi Valdez CSA was responsible for performing the following activities: Retraction, Suction, Irrigation, Suturing, Closing, Placing Dressing, Harvesting of Vessels, Bypass Grafting and All aspects of the cardiac case and their skilled assistance was necessary for the success of this case.    Anesthesia: General endotracheal anesthesia and RAFAT    Findings:  Severely calcific trileaflet aortic valve    Estimated Blood Loss: Approximately 400 cc, most of it recovered with a Cell Saver device and cardiotomy sucker was retransfused to the patient    STS Data:    The patient was explained the risks (STS risk score calculated), benefits and alternatives of surgery and agreed to proceed. The antibiotics and b blockers were given in the STS required window.  Counseling was given about diet, smoking and alcohol use as needed    Description of the procedure:     The patient was placed supine on the operative table. Anesthesia was given and lines placed. The patient was prepped and draped using the usual sterile technique. A 8 cm upper median sternotomy was performed with a scalpel and the layers carried down to the sternum using the electrocautery. The upper sternum was splited in the midline into the right third intercostal space using a vertical oscillating saw. Hemostasis was achieved. 300 units of IV heparin was given to maintain the ACT over 400.  A small Finochietto retractor was placed and anterior mediastinum was exposed.  The pericardium was opened  and the edges were tacked to the wound cannulation sutures were placed in the ascending aorta and right atrium. Small cannulas were placed and aorto right atrial cardiopulmonary bypass was started. Cardioplegia cannulas were placed and then the ascending aorta was clamped. One liter of cold blood cardioplegia was given in an antegrade fashion to achieved diastolic arrest and further doses every 15 minutes thereafter also using retrograde infusion.   A transverse proximal aortotomy was performed and the valve was exposed. The valve was trileaflet and severely calcific. The leaflets were resected and annulus debrided and washed out. The annulus was sized to a 25 mm magna pericardial prosthesis that was washed as recommended by the . 2-0 Prolene continuous suture was used to sewn each third of the valve with the corresponding part of the annulus.  The valve was parachuted into the annulus.  The valve was seated and the knots secured behind each post. The aortotomy was closed with a 4.0 prolene continuous suture using the double layer McGoon technique. The root was de-aired and the aortic clamp removed.  The left pleural space was suctioned and the lungs ventilated. The heart was paced till regular atrial rhythm resumed. I allowed the heart to eject  and I de-aired the left heart chambers under RAFAT guidance and once hemodynamics were acceptable, then the CPB was discontinued and the venous and cardioplegia cannulas removed. The matching dose of protamine was given and the aortic cannula removed as well. AV temporary wires and pleural and mediastinal chest tubes were placed and the wound sprayed with platelet rich plasma. The perioperative RAFAT showed the valve well seated without significant perivalvular leaks. The sternum was closed with single and double wires and soft tissue in layers of reabsorbable material. The wounds were covered with sterile dressings.       Specimen removed: Calcific aortic  leaflets    CPB time: 68 minutes    Aortic clamp time: 56 minutes    Complications:  none           Disposition: Cardiovascular recovery room           Condition: Critical but stable.

## 2021-08-05 NOTE — ANESTHESIA PROCEDURE NOTES
Central Line      Patient location during procedure: OR  Start time: 8/5/2021 6:55 AM  Stop Time:8/5/2021 7:05 AM  Indications: central pressure monitoring, vascular access and MD/Surgeon request  Staff  Anesthesiologist: Artis Askew MD  Preanesthetic Checklist  Completed: patient identified, IV checked, site marked, risks and benefits discussed, surgical consent, monitors and equipment checked, pre-op evaluation and timeout performed  Central Line Prep  Sterile Tech:gloves, cap, gown, mask and sterile barriers  Prep: chloraprep  Patient monitoring: blood pressure monitoring, continuous pulse oximetry and EKG  Central Line Procedure  Laterality:right  Location:internal jugular  Catheter Type:double lumen and MAC  Catheter Size:9 Fr  Guidance:ultrasound guided  PROCEDURE NOTE/ULTRASOUND INTERPRETATION.  Using ultrasound guidance the potential vascular sites for insertion of the catheter were visualized to determine the patency of the vessel to be used for vascular access.  After selecting the appropriate site for insertion, the needle was visualized under ultrasound being inserted into the internal jugular vein, followed by ultrasound confirmation of wire and catheter placement. There were no abnormalities seen on ultrasound; an image was taken; and the patient tolerated the procedure with no complications. Images: still images obtained, printed/placed on chart  Assessment  Post procedure:biopatch applied, line sutured and occlusive dressing applied  Assessement:blood return through all ports, free fluid flow and chest x-ray ordered  Complications:no  Patient Tolerance:patient tolerated the procedure well with no apparent complications  Additional Notes  R IJ MAC VIA ASEPTIC SELDINGER TECH THRU ANTERIOR APPROACH US GUIDANCE X1

## 2021-08-05 NOTE — PLAN OF CARE
Goal Outcome Evaluation:  Plan of Care Reviewed With: patient        Progress: improving  Outcome Summary: Pt had aortic valve replacement with Dr Padilla. Came to unit at 1015 am. Lung coarse. 7.5 ETT tube 20 at lips. Mini-sternomy completed. Incision dressing clean dry and intact. Sinus rhythm to occasional sinus bradycardia. AAI paced at times for a back up rate of 60. R plural and MCT chest tube. serosangeous drainage. Pt extubated at 1645. Nausea and vomiting noted after Morphine 1 mg iv given. Gave zofran 4 mg iv. Emesis out 100 ml.

## 2021-08-05 NOTE — ANESTHESIA PROCEDURE NOTES
Airway  Urgency: elective    Date/Time: 8/5/2021 6:55 AM  End Time:8/5/2021 6:55 AM  Airway not difficult    General Information and Staff    Patient location during procedure: OR  Anesthesiologist: Artis Askew MD    Indications and Patient Condition  Indications for airway management: airway protection    Preoxygenated: yes  MILS maintained throughout  Mask difficulty assessment: 1 - vent by mask    Final Airway Details  Final airway type: endotracheal airway      Successful airway: ETT  Cuffed: yes   Successful intubation technique: direct laryngoscopy  Endotracheal tube insertion site: oral  Blade: Leona  Blade size: 4  ETT size (mm): 7.5  Cormack-Lehane Classification: grade I - full view of glottis  Placement verified by: chest auscultation and capnometry   Measured from: gums  ETT/EBT to gums (cm): 22  Number of attempts at approach: 1    Additional Comments  X1 ATRAUMATIC.  CUFF TO MINIMUM OCCLUSIVE CUFF PRESSURE. POS ETCO2. BS=BS. RAFAT  BITE BLOCK

## 2021-08-05 NOTE — ANESTHESIA POSTPROCEDURE EVALUATION
Patient: Chong Manuel    Procedure Summary     Date: 08/05/21 Room / Location: [facility] CVOR 01 / [facility] CVOR    Anesthesia Start: 0640 Anesthesia Stop: 1025    Procedure: AORTIC VALVE REPAIR/REPLACEMENT (N/A Chest) Diagnosis:       Aortic stenosis      (Aortic stenosis [I35.0])    Surgeons: Nael Padilla MD Provider: Artis Askew MD    Anesthesia Type: general ASA Status: 4          Anesthesia Type: general    Vitals  No vitals data found for the desired time range.          Post Anesthesia Care and Evaluation    Patient location during evaluation: ICU  Patient participation: complete - patient cannot participate  Level of consciousness: obtunded/minimal responses  Pain scale: See nurse's notes for pain score.  Pain management: adequate  Airway patency: patent  Anesthetic complications: No anesthetic complications  PONV Status: none  Cardiovascular status: acceptable  Respiratory status: acceptable  Hydration status: acceptable    Comments: Patient seen and examined postoperatively; vital signs stable; SpO2 greater than or equal to 90%; cardiopulmonary status stable; nausea/vomiting adequately controlled; pain adequately controlled; no apparent anesthesia complications; patient discharged from anesthesia care when discharge criteria were met. Sedated on vent. Hemodynamically stable. Small minor skin tear upper lip when drapes removed.  Clean dressing.

## 2021-08-05 NOTE — ANESTHESIA PROCEDURE NOTES
Central Line      Patient location during procedure: OR  Start time: 8/5/2021 7:05 AM  Stop Time:8/5/2021 7:07 AM  Indications: central pressure monitoring, vascular access and MD/Surgeon request  Staff  Anesthesiologist: Artis Askew MD  Preanesthetic Checklist  Completed: patient identified, IV checked, site marked, risks and benefits discussed, surgical consent, monitors and equipment checked, pre-op evaluation and timeout performed  Central Line Prep  Sterile Tech:cap, gloves, gown, mask and sterile barriers  Prep: chloraprep  Patient monitoring: blood pressure monitoring, continuous pulse oximetry and EKG  Central Line Procedure  Laterality:right  Location:internal jugular  Catheter Type:[place]  Additional Notes  THROUGH PREVIOUSLY PLACED MAC.  NEW DRAPE AND GLOVES. TO PA X1 ATTEMPT 49CM

## 2021-08-05 NOTE — ANESTHESIA PROCEDURE NOTES
Arterial Line      Patient location during procedure: OR  Start time: 8/5/2021 6:40 AM  Stop Time:8/5/2021 6:50 AM       Line placed for hemodynamic monitoring, ABGs/Labs/ISTAT and MD/Surgeon request.  Performed By   Anesthesiologist: Artis Askew MD  Preanesthetic Checklist  Completed: patient identified, IV checked, site marked, risks and benefits discussed, surgical consent, monitors and equipment checked, pre-op evaluation and timeout performed  Arterial Line Prep   Sterile Tech: cap, gloves, sterile barriers and mask  Prep: ChloraPrep  Patient monitoring: EKG, continuous pulse oximetry and blood pressure monitoring  Arterial Line Procedure   Laterality:left  Location:  radial artery  Catheter size: 20 G   Guidance: landmark technique and palpation technique  Number of attempts: 1  Successful placement: yes  Post Assessment   Dressing Type: wrist guard applied, secured with tape and occlusive dressing applied.   Complications no  Circ/Move/Sens Assessment: normal and unchanged.   Patient Tolerance: patient tolerated the procedure well with no apparent complications  Additional Notes  L RADIAL ART LINE X1. GOOD KATIE

## 2021-08-06 ENCOUNTER — APPOINTMENT (OUTPATIENT)
Dept: GENERAL RADIOLOGY | Facility: HOSPITAL | Age: 76
End: 2021-08-06

## 2021-08-06 LAB
ALBUMIN SERPL-MCNC: 4.4 G/DL (ref 3.5–5.2)
ANION GAP SERPL CALCULATED.3IONS-SCNC: 11 MMOL/L (ref 5–15)
BASOPHILS # BLD AUTO: 0 10*3/MM3 (ref 0–0.2)
BASOPHILS NFR BLD AUTO: 0.2 % (ref 0–1.5)
BUN SERPL-MCNC: 17 MG/DL (ref 8–23)
BUN/CREAT SERPL: 13.3 (ref 7–25)
CA-I SERPL ISE-MCNC: 1.25 MMOL/L (ref 1.2–1.3)
CALCIUM SPEC-SCNC: 8.8 MG/DL (ref 8.6–10.5)
CHLORIDE SERPL-SCNC: 106 MMOL/L (ref 98–107)
CO2 SERPL-SCNC: 22 MMOL/L (ref 22–29)
CREAT SERPL-MCNC: 1.28 MG/DL (ref 0.76–1.27)
DEPRECATED RDW RBC AUTO: 54.7 FL (ref 37–54)
EOSINOPHIL # BLD AUTO: 0 10*3/MM3 (ref 0–0.4)
EOSINOPHIL NFR BLD AUTO: 0 % (ref 0.3–6.2)
ERYTHROCYTE [DISTWIDTH] IN BLOOD BY AUTOMATED COUNT: 18.9 % (ref 12.3–15.4)
GFR SERPL CREATININE-BSD FRML MDRD: 54 ML/MIN/1.73
GLUCOSE BLDC GLUCOMTR-MCNC: 110 MG/DL (ref 70–105)
GLUCOSE BLDC GLUCOMTR-MCNC: 115 MG/DL (ref 70–105)
GLUCOSE BLDC GLUCOMTR-MCNC: 115 MG/DL (ref 70–105)
GLUCOSE BLDC GLUCOMTR-MCNC: 121 MG/DL (ref 70–105)
GLUCOSE BLDC GLUCOMTR-MCNC: 124 MG/DL (ref 70–105)
GLUCOSE BLDC GLUCOMTR-MCNC: 142 MG/DL (ref 70–105)
GLUCOSE BLDC GLUCOMTR-MCNC: 152 MG/DL (ref 70–105)
GLUCOSE BLDC GLUCOMTR-MCNC: 181 MG/DL (ref 70–105)
GLUCOSE BLDC GLUCOMTR-MCNC: 181 MG/DL (ref 70–105)
GLUCOSE BLDC GLUCOMTR-MCNC: 190 MG/DL (ref 70–105)
GLUCOSE BLDC GLUCOMTR-MCNC: 92 MG/DL (ref 70–105)
GLUCOSE SERPL-MCNC: 127 MG/DL (ref 65–99)
HCT VFR BLD AUTO: 22 % (ref 37.5–51)
HGB BLD-MCNC: 7.2 G/DL (ref 13–17.7)
INR PPP: 1.04 (ref 0.93–1.1)
LAB AP CASE REPORT: NORMAL
LYMPHOCYTES # BLD AUTO: 0.6 10*3/MM3 (ref 0.7–3.1)
LYMPHOCYTES NFR BLD AUTO: 5.1 % (ref 19.6–45.3)
MAGNESIUM SERPL-MCNC: 3.1 MG/DL (ref 1.6–2.4)
MCH RBC QN AUTO: 27.1 PG (ref 26.6–33)
MCHC RBC AUTO-ENTMCNC: 32.8 G/DL (ref 31.5–35.7)
MCV RBC AUTO: 82.5 FL (ref 79–97)
MONOCYTES # BLD AUTO: 0.9 10*3/MM3 (ref 0.1–0.9)
MONOCYTES NFR BLD AUTO: 7.8 % (ref 5–12)
NEUTROPHILS NFR BLD AUTO: 10 10*3/MM3 (ref 1.7–7)
NEUTROPHILS NFR BLD AUTO: 86.9 % (ref 42.7–76)
NRBC BLD AUTO-RTO: 0 /100 WBC (ref 0–0.2)
PATH REPORT.FINAL DX SPEC: NORMAL
PATH REPORT.GROSS SPEC: NORMAL
PHOSPHATE SERPL-MCNC: 5 MG/DL (ref 2.5–4.5)
PLATELET # BLD AUTO: 167 10*3/MM3 (ref 140–450)
PMV BLD AUTO: 7.1 FL (ref 6–12)
POTASSIUM SERPL-SCNC: 4.5 MMOL/L (ref 3.5–5.2)
PROTHROMBIN TIME: 11.5 SECONDS (ref 9.6–11.7)
QT INTERVAL: 368 MS
RBC # BLD AUTO: 2.66 10*6/MM3 (ref 4.14–5.8)
SODIUM SERPL-SCNC: 139 MMOL/L (ref 136–145)
WBC # BLD AUTO: 11.6 10*3/MM3 (ref 3.4–10.8)

## 2021-08-06 PROCEDURE — 71045 X-RAY EXAM CHEST 1 VIEW: CPT

## 2021-08-06 PROCEDURE — 80069 RENAL FUNCTION PANEL: CPT | Performed by: NURSE PRACTITIONER

## 2021-08-06 PROCEDURE — 99024 POSTOP FOLLOW-UP VISIT: CPT | Performed by: THORACIC SURGERY (CARDIOTHORACIC VASCULAR SURGERY)

## 2021-08-06 PROCEDURE — 97162 PT EVAL MOD COMPLEX 30 MIN: CPT

## 2021-08-06 PROCEDURE — 94799 UNLISTED PULMONARY SVC/PX: CPT

## 2021-08-06 PROCEDURE — 97166 OT EVAL MOD COMPLEX 45 MIN: CPT

## 2021-08-06 PROCEDURE — 83735 ASSAY OF MAGNESIUM: CPT | Performed by: NURSE PRACTITIONER

## 2021-08-06 PROCEDURE — 25010000002 MORPHINE PER 10 MG: Performed by: THORACIC SURGERY (CARDIOTHORACIC VASCULAR SURGERY)

## 2021-08-06 PROCEDURE — 99232 SBSQ HOSP IP/OBS MODERATE 35: CPT | Performed by: NURSE PRACTITIONER

## 2021-08-06 PROCEDURE — 82962 GLUCOSE BLOOD TEST: CPT

## 2021-08-06 PROCEDURE — 94640 AIRWAY INHALATION TREATMENT: CPT

## 2021-08-06 PROCEDURE — 25010000002 CEFAZOLIN PER 500 MG: Performed by: NURSE PRACTITIONER

## 2021-08-06 PROCEDURE — 82330 ASSAY OF CALCIUM: CPT | Performed by: NURSE PRACTITIONER

## 2021-08-06 PROCEDURE — 25010000002 ONDANSETRON PER 1 MG: Performed by: NURSE PRACTITIONER

## 2021-08-06 PROCEDURE — 25010000002 METOCLOPRAMIDE PER 10 MG: Performed by: THORACIC SURGERY (CARDIOTHORACIC VASCULAR SURGERY)

## 2021-08-06 PROCEDURE — 85610 PROTHROMBIN TIME: CPT | Performed by: NURSE PRACTITIONER

## 2021-08-06 PROCEDURE — 85025 COMPLETE CBC W/AUTO DIFF WBC: CPT | Performed by: NURSE PRACTITIONER

## 2021-08-06 PROCEDURE — 93005 ELECTROCARDIOGRAM TRACING: CPT | Performed by: NURSE PRACTITIONER

## 2021-08-06 PROCEDURE — 93010 ELECTROCARDIOGRAM REPORT: CPT | Performed by: INTERNAL MEDICINE

## 2021-08-06 PROCEDURE — 25010000002 MAGNESIUM SULFATE 2 GM/50ML SOLUTION: Performed by: NURSE PRACTITIONER

## 2021-08-06 RX ORDER — FUROSEMIDE 10 MG/ML
20 INJECTION INTRAMUSCULAR; INTRAVENOUS ONCE
Status: DISCONTINUED | OUTPATIENT
Start: 2021-08-06 | End: 2021-08-06

## 2021-08-06 RX ORDER — BUDESONIDE 0.5 MG/2ML
0.5 INHALANT ORAL
Status: DISCONTINUED | OUTPATIENT
Start: 2021-08-06 | End: 2021-08-10 | Stop reason: HOSPADM

## 2021-08-06 RX ORDER — LOVASTATIN 20 MG/1
20 TABLET ORAL
COMMUNITY
Start: 2021-05-26 | End: 2021-08-10 | Stop reason: HOSPADM

## 2021-08-06 RX ORDER — SCOLOPAMINE TRANSDERMAL SYSTEM 1 MG/1
1 PATCH, EXTENDED RELEASE TRANSDERMAL
Status: DISCONTINUED | OUTPATIENT
Start: 2021-08-06 | End: 2021-08-10 | Stop reason: HOSPADM

## 2021-08-06 RX ORDER — IPRATROPIUM BROMIDE AND ALBUTEROL SULFATE 2.5; .5 MG/3ML; MG/3ML
3 SOLUTION RESPIRATORY (INHALATION)
Status: DISCONTINUED | OUTPATIENT
Start: 2021-08-06 | End: 2021-08-10 | Stop reason: HOSPADM

## 2021-08-06 RX ORDER — POTASSIUM CHLORIDE 1500 MG/1
20 TABLET, FILM COATED, EXTENDED RELEASE ORAL DAILY
COMMUNITY
Start: 2021-07-08 | End: 2021-08-10 | Stop reason: HOSPADM

## 2021-08-06 RX ORDER — ATORVASTATIN CALCIUM 40 MG/1
40 TABLET, FILM COATED ORAL NIGHTLY
Status: DISCONTINUED | OUTPATIENT
Start: 2021-08-06 | End: 2021-08-10 | Stop reason: HOSPADM

## 2021-08-06 RX ADMIN — DOCUSATE SODIUM 50 MG AND SENNOSIDES 8.6 MG 2 TABLET: 8.6; 5 TABLET, FILM COATED ORAL at 20:43

## 2021-08-06 RX ADMIN — DOCUSATE SODIUM 50 MG AND SENNOSIDES 8.6 MG 2 TABLET: 8.6; 5 TABLET, FILM COATED ORAL at 08:30

## 2021-08-06 RX ADMIN — MAGNESIUM SULFATE HEPTAHYDRATE 2 G: 2 INJECTION, SOLUTION INTRAVENOUS at 02:18

## 2021-08-06 RX ADMIN — MUPIROCIN: 20 OINTMENT TOPICAL at 20:43

## 2021-08-06 RX ADMIN — PANTOPRAZOLE SODIUM 40 MG: 40 TABLET, DELAYED RELEASE ORAL at 06:09

## 2021-08-06 RX ADMIN — HYDROCODONE BITARTRATE AND ACETAMINOPHEN 1 TABLET: 10; 325 TABLET ORAL at 15:35

## 2021-08-06 RX ADMIN — HYDROCODONE BITARTRATE AND ACETAMINOPHEN 1 TABLET: 10; 325 TABLET ORAL at 06:09

## 2021-08-06 RX ADMIN — HYDROCODONE BITARTRATE AND ACETAMINOPHEN 1 TABLET: 5; 325 TABLET ORAL at 02:18

## 2021-08-06 RX ADMIN — ONDANSETRON 4 MG: 2 INJECTION INTRAMUSCULAR; INTRAVENOUS at 06:09

## 2021-08-06 RX ADMIN — CHLORHEXIDINE GLUCONATE 15 ML: 1.2 SOLUTION ORAL at 08:30

## 2021-08-06 RX ADMIN — GABAPENTIN 100 MG: 100 CAPSULE ORAL at 15:35

## 2021-08-06 RX ADMIN — CEFAZOLIN SODIUM 2 G: 10 INJECTION, POWDER, FOR SOLUTION INTRAVENOUS at 09:11

## 2021-08-06 RX ADMIN — HYDROCODONE BITARTRATE AND ACETAMINOPHEN 1 TABLET: 10; 325 TABLET ORAL at 10:15

## 2021-08-06 RX ADMIN — MUPIROCIN: 20 OINTMENT TOPICAL at 08:30

## 2021-08-06 RX ADMIN — METOCLOPRAMIDE 10 MG: 5 INJECTION, SOLUTION INTRAMUSCULAR; INTRAVENOUS at 02:30

## 2021-08-06 RX ADMIN — METOPROLOL TARTRATE 12.5 MG: 25 TABLET, FILM COATED ORAL at 10:48

## 2021-08-06 RX ADMIN — SCOLOPAMINE TRANSDERMAL SYSTEM 1 PATCH: 1 PATCH, EXTENDED RELEASE TRANSDERMAL at 10:48

## 2021-08-06 RX ADMIN — IPRATROPIUM BROMIDE AND ALBUTEROL SULFATE 3 ML: 2.5; .5 SOLUTION RESPIRATORY (INHALATION) at 11:47

## 2021-08-06 RX ADMIN — MORPHINE SULFATE 2 MG: 4 INJECTION INTRAVENOUS at 09:05

## 2021-08-06 RX ADMIN — GABAPENTIN 100 MG: 100 CAPSULE ORAL at 08:30

## 2021-08-06 RX ADMIN — METOCLOPRAMIDE 10 MG: 5 INJECTION, SOLUTION INTRAMUSCULAR; INTRAVENOUS at 08:30

## 2021-08-06 RX ADMIN — CEFAZOLIN SODIUM 2 G: 10 INJECTION, POWDER, FOR SOLUTION INTRAVENOUS at 02:19

## 2021-08-06 RX ADMIN — ONDANSETRON 4 MG: 2 INJECTION INTRAMUSCULAR; INTRAVENOUS at 15:35

## 2021-08-06 RX ADMIN — BUDESONIDE 0.5 MG: 0.5 SUSPENSION RESPIRATORY (INHALATION) at 19:02

## 2021-08-06 RX ADMIN — METOCLOPRAMIDE 10 MG: 5 INJECTION, SOLUTION INTRAMUSCULAR; INTRAVENOUS at 15:35

## 2021-08-06 RX ADMIN — CHLORHEXIDINE GLUCONATE 15 ML: 1.2 SOLUTION ORAL at 20:43

## 2021-08-06 RX ADMIN — ACETAMINOPHEN 650 MG: 325 TABLET, FILM COATED ORAL at 06:09

## 2021-08-06 RX ADMIN — MORPHINE SULFATE 2 MG: 4 INJECTION INTRAVENOUS at 02:30

## 2021-08-06 RX ADMIN — METOCLOPRAMIDE 10 MG: 5 INJECTION, SOLUTION INTRAMUSCULAR; INTRAVENOUS at 20:43

## 2021-08-06 RX ADMIN — CEFAZOLIN SODIUM 2 G: 10 INJECTION, POWDER, FOR SOLUTION INTRAVENOUS at 18:48

## 2021-08-06 RX ADMIN — METOPROLOL TARTRATE 12.5 MG: 25 TABLET, FILM COATED ORAL at 20:43

## 2021-08-06 RX ADMIN — HYDROCODONE BITARTRATE AND ACETAMINOPHEN 1 TABLET: 10; 325 TABLET ORAL at 20:43

## 2021-08-06 RX ADMIN — BUDESONIDE 0.5 MG: 0.5 SUSPENSION RESPIRATORY (INHALATION) at 11:47

## 2021-08-06 RX ADMIN — ASPIRIN 81 MG: 81 TABLET, COATED ORAL at 08:30

## 2021-08-06 RX ADMIN — GABAPENTIN 100 MG: 100 CAPSULE ORAL at 20:43

## 2021-08-06 RX ADMIN — ACETAMINOPHEN 650 MG: 325 TABLET, FILM COATED ORAL at 02:18

## 2021-08-06 RX ADMIN — IPRATROPIUM BROMIDE AND ALBUTEROL SULFATE 3 ML: 2.5; .5 SOLUTION RESPIRATORY (INHALATION) at 19:02

## 2021-08-06 RX ADMIN — ATORVASTATIN CALCIUM 40 MG: 40 TABLET, FILM COATED ORAL at 20:43

## 2021-08-06 NOTE — THERAPY EVALUATION
Patient Name: Chong Manuel  : 1945    MRN: [id number]                              Today's Date: 2021       Admit Date: 2021    Visit Dx:     ICD-10-CM ICD-9-CM   1. Aortic stenosis  I35.0 424.1     Patient Active Problem List   Diagnosis   • Acute on chronic blood loss anemia   • Essential hypertension   • Hyperlipidemia   • Chronic GERD   • CVA (cerebral vascular accident) (CMS/Formerly [facility])   • Hypertrophy of prostate without urinary obstruction and other lower urinary tract symptoms (LUTS)   • Iron deficiency anemia   • Pure hypercholesterolemia   • Solitary pulmonary nodule   • Shortness of breath   • Chest discomfort   • Aortic stenosis, severe   • Aortic stenosis   • GI bleed     Past Medical History:   Diagnosis Date   • Anemia    • GERD (gastroesophageal reflux disease)    • History of transfusion    • Hyperlipidemia    • Hypertension    • Lung nodules     left    • Shortness of breath 2021   • Stroke (CMS/Formerly [facility])      Past Surgical History:   Procedure Laterality Date   • CARDIAC CATHETERIZATION N/A 2021    Procedure: Left Heart Cath;  Surgeon: Darian aFjardo MD;  Location: [facility] CATH INVASIVE LOCATION;  Service: Cardiovascular;  Laterality: N/A;   • CARDIAC CATHETERIZATION N/A 2021    Procedure: Right Heart Cath;  Surgeon: Darian Fajardo MD;  Location: [facility] CATH INVASIVE LOCATION;  Service: Cardiovascular;  Laterality: N/A;   • COLONOSCOPY     • COLONOSCOPY N/A 2021    Procedure: COLONOSCOPY with polypectomy;  Surgeon: CIRA Pope MD;  Location: [facility] ENDOSCOPY;  Service: Gastroenterology;  Laterality: N/A;  post op: diverticulosis, polyp   • ENDOSCOPY N/A 2021    Procedure: ESOPHAGOGASTRODUODENOSCOPY;  Surgeon: CIRA Pope MD;  Location: [facility] ENDOSCOPY;  Service: Gastroenterology;  Laterality: N/A;  HH, gastric polyps   • HERNIA REPAIR       General Information     Row Name 21 1127          Physical Therapy Time and Intention    Document Type   evaluation  -     Mode of Treatment  physical therapy  -     Row Name 08/06/21 1127          General Information    Patient Profile Reviewed  yes  -     Prior Level of Function  independent:  -     Existing Precautions/Restrictions  oxygen therapy device and L/min;sternal;fall  -     Row Name 08/06/21 1127          Living Environment    Lives With  alone It is anticipated pt will have 24/7 assist of family at home  -     Row Name 08/06/21 1127          Home Main Entrance    Number of Stairs, Main Entrance  none  -     Row Name 08/06/21 1127          Cognition    Orientation Status (Cognition)  oriented x 3  -     Row Name 08/06/21 1127          Safety Issues, Functional Mobility    Safety Issues Affecting Function (Mobility)  safety precaution awareness;insight into deficits/self-awareness  -     Impairments Affecting Function (Mobility)  balance;endurance/activity tolerance;shortness of breath;strength;pain;postural/trunk control  -       User Key  (r) = Recorded By, (t) = Taken By, (c) = Cosigned By    Initials Name Provider Type     Allyson Hightower, PT Physical Therapist        Mobility     Row Name 08/06/21 1127          Bed Mobility    Bed Mobility  -- up in chair  -[facility] Name 08/06/21 1127          Bed-Chair Transfer    Bed-Chair [place] (Transfers)  not tested  -     Row Name 08/06/21 1127          Sit-Stand Transfer    Sit-Stand [place] (Transfers)  2 person assist;minimum assist (75% patient effort)  -     Assistive Device (Sit-Stand Transfers)  walker, front-wheeled  -[facility] Name 08/06/21 1127          Gait/Stairs (Locomotion)    [place] Level (Gait)  minimum assist (75% patient effort);1 person to manage equipment  -     Assistive Device (Gait)  walker, front-wheeled  -     Distance in Feet (Gait)  100 ft  -     Deviations/Abnormal Patterns (Gait)  gait speed decreased  -     Comment (Gait/Stairs)  Clara for navigation of RW in his environment,  verbal cues for sternal precautions with use of RW; Cardiac level III  -HC       User Key  (r) = Recorded By, (t) = Taken By, (c) = Cosigned By    Initials Name Provider Type    HC Allyson Hightower, PT Physical Therapist        Obj/Interventions     Row Name 08/06/21 1128          Range of Motion Comprehensive    Comment, General Range of Motion  BLEs WFL  -HC     Row Name 08/06/21 1128          Strength Comprehensive (MMT)    Comment, General Manual Muscle Testing (MMT) Assessment  BLEs grossly WFL  -HC     Row Name 08/06/21 1128          Balance    Balance Assessment  sitting static balance;sitting dynamic balance;standing static balance;standing dynamic balance  -HC     Static Sitting Balance  WFL  -HC     Dynamic Sitting Balance  mild impairment  -HC     Static Standing Balance  mild impairment  -HC     Dynamic Standing Balance  mild impairment  -HC       User Key  (r) = Recorded By, (t) = Taken By, (c) = Cosigned By    Initials Name Provider Type     Allyson Hightower, PT Physical Therapist        Goals/Plan     Row Name 08/06/21 1131          Bed Mobility Goal 1 (PT)    Activity/Assistive Device (Bed Mobility Goal 1, PT)  bed mobility activities, all  -HC     [place] Level/Cues Needed (Bed Mobility Goal 1, PT)  contact guard assist  -HC     Time Frame (Bed Mobility Goal 1, PT)  2 weeks  -HC     Row Name 08/06/21 1131          Transfer Goal 1 (PT)    Activity/Assistive Device (Transfer Goal 1, PT)  transfers, all  -HC     [place] Level/Cues Needed (Transfer Goal 1, PT)  standby assist  -HC     Time Frame (Transfer Goal 1, PT)  2 weeks  -HC     Row Name 08/06/21 1131          Gait Training Goal 1 (PT)    Activity/Assistive Device (Gait Training Goal 1, PT)  gait (walking locomotion)  -HC     [place] Level (Gait Training Goal 1, PT)  standby assist  -HC     Distance (Gait Training Goal 1, PT)  300 ft  -HC     Time Frame (Gait Training Goal 1, PT)  2 weeks  -HC       User Key  (r) = Recorded By,  (t) = Taken By, (c) = Cosigned By    Initials Name Provider Type     Allyson Hightower, PT Physical Therapist        Clinical Impression     Row Name 08/06/21 1129          Pain    Additional Documentation  Pain Scale: Numbers Pre/Post-Treatment (Group)  -     Row Name 08/06/21 1129          Pain Scale: Numbers Pre/Post-Treatment    Pretreatment Pain Rating  6/10  -HC     Posttreatment Pain Rating  8/10  -HC     Pre/Posttreatment Pain Comment  chest tube discomfort, incisional site pain; RN gave pain meds prior to treatment session  -     Pain Intervention(s)  Repositioned;Rest  -     Row Name 08/06/21 1129          Plan of Care Review    Outcome Summary  Pt is 75 yo male s/p minimally invasive AVR (mini sternotomy) on 8/5/2021.  PMH: HTN, CVA.  -     Row Name 08/06/21 1129          Therapy Assessment/Plan (PT)    Patient/Family Therapy Goals Statement (PT)  increase strength  -     Rehab Potential (PT)  good, to achieve stated therapy goals  -     Criteria for Skilled Interventions Met (PT)  yes;skilled treatment is necessary  -     Predicted Duration of Therapy Intervention (PT)  until d/c  -HC     Row Name 08/06/21 1129          Vital Signs    Posttreatment Heart Rate (beats/min)  85  -HC     Pre SpO2 (%)  96  -HC     O2 Delivery Pre Treatment  supplemental O2  -HC     Post SpO2 (%)  95  -HC     O2 Delivery Post Treatment  supplemental O2  -     Row Name 08/06/21 1129          Positioning and Restraints    Pre-Treatment Position  sitting in chair/recliner  -     Post Treatment Position  chair  -HC     In Chair  notified nsg;call light within reach;encouraged to call for assist;exit alarm on;with family/caregiver  -       User Key  (r) = Recorded By, (t) = Taken By, (c) = Cosigned By    Initials Name Provider Type     Allyson Hightower, PT Physical Therapist        Outcome Measures     Row Name 08/06/21 1134 08/06/21 1132       How much help from another person do you currently need...     Turning from your back to your side while in flat bed without using bedrails?  2  -NA  2  -HC    Moving from lying on back to sitting on the side of a flat bed without bedrails?  2  -NA  2  -HC    Moving to and from a bed to a chair (including a wheelchair)?  3  -NA  3  -HC    Standing up from a chair using your arms (e.g., wheelchair, bedside chair)?  3  -NA  3  -HC    Climbing 3-5 steps with a railing?  2  -NA  2  -HC    To walk in hospital room?  3  -NA  3  -HC    AM-PAC 6 Clicks Score (PT)  15  -NA  15  -HC    Row Name 08/06/21 1134 08/06/21 1132       Functional Assessment    Outcome Measure Options  AM-PAC 6 Clicks Basic Mobility (PT);AM-PAC 6 Clicks Daily Activity (OT)  -NA  AM-PAC 6 Clicks Basic Mobility (PT)  -HC      User Key  (r) = Recorded By, (t) = Taken By, (c) = Cosigned By    Initials Name Provider Type     Sandie Manuel OT Occupational Therapist     Allyson Hightower, PT Physical Therapist                       Physical Therapy Education                 Title: PT OT SLP Therapies (In Progress)     Topic: Physical Therapy (In Progress)     Point: Mobility training (In Progress)     Learning Progress Summary           Patient Acceptance, E, NR by  at 8/6/2021 1132                   Point: Precautions (In Progress)     Learning Progress Summary           Patient Acceptance, E, NR by  at 8/6/2021 1132                               User Key     Initials Effective Dates Name Provider Type Discipline     06/16/21 -  Allyson Hightower, PT Physical Therapist PT              PT Recommendation and Plan  Planned Therapy Interventions (PT): balance training, bed mobility training, gait training, neuromuscular re-education, strengthening, patient/family education, postural re-education, transfer training, home exercise program, ROM (range of motion), stair training  Plan of Care Reviewed With: patient, grandchild(eleazar), son  Outcome Summary: Pt is 75 yo male s/p minimally invasive AVR (mini  sternotomy) on 8/5/2021.  PMH: HTN, CVA.  Pt educated on sternal precautions but will need further education on precautions to improve carryover with mobility.  Pt requiring minAx2 for safety with transfers and Clara to ambulate using RW.  Verbal cues needed for following sternal precautions with use of RW and to improve upright postural control.  Pt previously indep with mobility and ADLs living home alone in [facility].  PT spoke with pt's son and grandson present in room on IP rehab vs Home with 24/7 assist of family.  It is anticipated pt will progress toward mobility and be able to d/c home with 24/7 assist of family and HHPT.   PT will follow 5x/week while at [facility] and continue to assess d/c needs pending progress.     Time Calculation:   PT Charges     Row Name 08/06/21 1134             Time Calculation    Start Time  1043  -HC      Stop Time  1100  -      Time Calculation (min)  17 min  -      PT Received On  08/06/21  -      PT - Next Appointment  08/08/21  -      PT Goal Re-Cert Due Date  08/20/21  -         Time Calculation- PT    Total Timed Code Minutes- PT  0 minute(s)  -        User Key  (r) = Recorded By, (t) = Taken By, (c) = Cosigned By    Initials Name Provider Type     Allyson Hightower, PT Physical Therapist        Therapy Charges for Today     Code Description Service Date Service Provider Modifiers Qty    [id number]  PT EVAL MOD COMPLEXITY 4 8/6/2021 Allyson Hightower, PT GP 1          PT G-Codes  Outcome Measure Options: AM-PAC 6 Clicks Basic Mobility (PT), AM-PAC 6 Clicks Daily Activity (OT)  AM-PAC 6 Clicks Score (PT): 15  AM-PAC 6 Clicks Score (OT): 16    Allyson Hightower PT  8/6/2021

## 2021-08-06 NOTE — PROGRESS NOTES
"CARDIOLOGY FOLLOW-UP PROGRESS NOTE      Reason for follow-up:    Aortic Stenosis  S/p AVR     Attending: Nael Padilla MD      Subjective .     Denies dyspnea  \"sore\"       Review of Systems   Constitutional: Negative for decreased appetite and diaphoresis.   HENT: Negative for congestion, hearing loss and nosebleeds.    Cardiovascular: Positive for chest pain and dyspnea on exertion. Negative for claudication, irregular heartbeat, leg swelling, near-syncope, orthopnea, palpitations, paroxysmal nocturnal dyspnea and syncope.   Respiratory: Negative for cough, shortness of breath and sleep disturbances due to breathing.    Endocrine: Negative for polyuria.   Hematologic/Lymphatic: Does not bruise/bleed easily.   Skin: Negative for itching and rash.   Musculoskeletal: Negative for back pain, muscle weakness and myalgias.   Gastrointestinal: Negative for abdominal pain, change in bowel habit and nausea.   Genitourinary: Negative for dysuria, flank pain, frequency and hesitancy.   Neurological: Negative for dizziness, tremors and weakness.   Psychiatric/Behavioral: Negative for altered mental status. The patient does not have insomnia.        Allergies: Sulfa antibiotics    Scheduled Meds:aspirin, 81 mg, Oral, Daily  aspirin, 325 mg, Oral, Once  atorvastatin, 40 mg, Oral, Nightly  ceFAZolin, 2 g, Intravenous, Q8H  chlorhexidine, 15 mL, Mouth/Throat, Q12H  [START ON 8/7/2021] enoxaparin, 40 mg, Subcutaneous, Daily  gabapentin, 100 mg, Oral, TID  magnesium sulfate, 2 g, Intravenous, Q8H  metoclopramide, 10 mg, Intravenous, Q6H  mupirocin, , Each Nare, BID  pantoprazole, 40 mg, Oral, QAM  senna-docusate sodium, 2 tablet, Oral, BID        Continuous Infusions:dexmedetomidine, 0.2-1.5 mcg/kg/hr, Last Rate: 0.1 mcg/kg/hr (08/05/21 1450)  insulin, 0-50 Units/hr, Last Rate: 1 Units/hr (08/06/21 0056)  niCARdipine, 5-15 mg/hr, Last Rate: Stopped (08/06/21 0200)  nitroglycerin, 5-200 mcg/min, Last Rate: Stopped (08/05/21 " "1115)  propofol, 5-50 mcg/kg/min  sodium chloride, 30 mL/hr        PRN Meds:.•  acetaminophen **OR** acetaminophen **OR** acetaminophen  •  albumin human  •  bisacodyl  •  bisacodyl  •  HYDROcodone-acetaminophen  •  HYDROcodone-acetaminophen  •  insulin  •  magnesium hydroxide  •  Morphine **AND** naloxone  •  niCARdipine  •  ondansetron  •  polyethylene glycol  •  potassium chloride **OR** potassium chloride  •  propofol  •  sodium chloride    Objective     VITAL SIGNS  Patient Vitals for the past 24 hrs:   BP Temp Temp src Pulse Resp SpO2 Height Weight   08/06/21 0758 114/53 98.4 °F (36.9 °C) -- 77 11 100 % -- --   08/06/21 0720 105/74 -- Oral -- 19 100 % 167.6 cm (66\") 77 kg (169 lb 12.1 oz)   08/06/21 0549 -- -- -- -- -- -- -- 77 kg (169 lb 12.1 oz)   08/06/21 0530 119/58 98.2 °F (36.8 °C) -- 86 -- 97 % -- --   08/06/21 0520 106/51 98.2 °F (36.8 °C) -- 83 -- 100 % -- --   08/06/21 0500 106/51 98.2 °F (36.8 °C) -- 83 -- 100 % -- --   08/06/21 0430 116/56 98.2 °F (36.8 °C) -- 80 -- 100 % -- --   08/06/21 0400 105/45 98.1 °F (36.7 °C) -- 79 -- 99 % -- --   08/06/21 0331 113/52 98.2 °F (36.8 °C) -- 82 -- 100 % -- --   08/06/21 0330 -- 98.2 °F (36.8 °C) -- 82 -- 100 % -- --   08/06/21 0300 112/56 98.2 °F (36.8 °C) -- 85 -- 100 % -- --   08/06/21 0230 113/53 98.1 °F (36.7 °C) -- 87 -- 95 % -- --   08/06/21 0217 115/46 98.1 °F (36.7 °C) -- 84 -- -- -- --   08/06/21 0200 115/46 98.1 °F (36.7 °C) -- 80 -- 100 % -- --   08/06/21 0130 119/49 97.9 °F (36.6 °C) -- 84 -- 96 % -- --   08/06/21 0100 119/59 97.3 °F (36.3 °C) -- 85 -- 93 % -- --   08/06/21 0030 120/49 97.7 °F (36.5 °C) -- 85 -- 96 % -- --   08/06/21 0000 115/53 97.2 °F (36.2 °C) -- 94 -- 95 % -- --   08/05/21 2330 113/54 97.7 °F (36.5 °C) -- 83 -- 100 % -- --   08/05/21 2300 114/50 97.3 °F (36.3 °C) -- 83 -- 100 % -- --   08/05/21 2101 115/49 97.3 °F (36.3 °C) -- 82 -- 98 % -- --   08/05/21 2047 115/49 97.2 °F (36.2 °C) -- 82 -- 95 % -- --   08/05/21 1930 105/51 " 96.3 °F (35.7 °C) -- 74 -- 100 % -- --   08/05/21 1915 96/62 96.3 °F (35.7 °C) -- 76 -- 93 % -- --   08/05/21 1900 101/55 96.3 °F (35.7 °C) -- 73 -- 98 % -- --   08/05/21 1831 104/44 96.4 °F (35.8 °C) -- 70 -- 98 % -- --   08/05/21 1830 -- 96.3 °F (35.7 °C) -- 71 -- 97 % -- --   08/05/21 1826 -- 96.4 °F (35.8 °C) -- 69 -- 96 % -- --   08/05/21 1822 111/51 96.4 °F (35.8 °C) -- 73 -- 94 % -- --   08/05/21 1807 108/54 96.4 °F (35.8 °C) -- 69 -- 94 % -- --   08/05/21 1801 104/44 96.4 °F (35.8 °C) -- 79 -- 91 % -- --   08/05/21 1745 104/43 96.8 °F (36 °C) -- 63 -- 91 % -- --   08/05/21 1731 105/49 96.8 °F (36 °C) -- 63 -- 97 % -- --   08/05/21 1730 -- 97 °F (36.1 °C) -- 64 -- 97 % -- --   08/05/21 1729 102/50 97 °F (36.1 °C) -- 65 -- 97 % -- --   08/05/21 1715 -- 97.2 °F (36.2 °C) -- 80 -- 98 % -- --   08/05/21 1714 109/49 97.2 °F (36.2 °C) -- 80 -- 99 % -- --   08/05/21 1702 107/48 97.2 °F (36.2 °C) -- 80 -- 96 % -- --   08/05/21 1700 -- 97.2 °F (36.2 °C) -- 80 -- 93 % -- --   08/05/21 1645 -- 97.2 °F (36.2 °C) -- 80 -- 93 % -- --   08/05/21 1631 (!) 88/54 97.7 °F (36.5 °C) -- 80 -- 97 % -- --   08/05/21 1630 -- 97.7 °F (36.5 °C) -- 80 -- 97 % -- --   08/05/21 1615 -- 97.9 °F (36.6 °C) -- 80 -- 97 % -- --   08/05/21 1601 97/58 97.9 °F (36.6 °C) -- 80 -- 98 % -- --   08/05/21 1600 -- 97.9 °F (36.6 °C) -- 80 -- 98 % -- --   08/05/21 1545 -- 98.1 °F (36.7 °C) -- 80 -- 98 % -- --   08/05/21 1530 -- 98.1 °F (36.7 °C) -- 80 -- 98 % -- --   08/05/21 1515 -- 98.1 °F (36.7 °C) -- 65 -- 98 % -- --   08/05/21 1500 -- 98.2 °F (36.8 °C) -- 67 -- 98 % -- --   08/05/21 1445 -- 98.2 °F (36.8 °C) -- 67 -- 98 % -- --   08/05/21 1430 -- 98.2 °F (36.8 °C) -- 68 -- 98 % -- --   08/05/21 1415 -- 98.2 °F (36.8 °C) -- 70 -- 97 % -- --   08/05/21 1402 -- 98.1 °F (36.7 °C) -- 70 -- 98 % -- --   08/05/21 1400 -- 98.1 °F (36.7 °C) -- 71 -- 98 % -- --   08/05/21 1345 -- 97.9 °F (36.6 °C) -- 71 -- 98 % -- --   08/05/21 1330 -- 97.3 °F (36.3 °C) --  "71 -- 97 % -- --   08/05/21 1315 (!) 86/44 97 °F (36.1 °C) -- 80 -- 98 % -- --   08/05/21 1300 -- 97 °F (36.1 °C) -- 68 -- 99 % -- --   08/05/21 1245 -- 96.3 °F (35.7 °C) -- 67 -- 98 % -- --   08/05/21 1230 -- 96.1 °F (35.6 °C) -- 66 -- 98 % -- --   08/05/21 1219 -- 95.5 °F (35.3 °C) -- 66 -- 99 % -- --   08/05/21 1215 -- 95.5 °F (35.3 °C) -- 66 -- 99 % -- --   08/05/21 1200 -- 95.4 °F (35.2 °C) -- 64 -- 99 % -- --   08/05/21 1145 -- 95.2 °F (35.1 °C) -- 61 -- 98 % -- --   08/05/21 1136 -- 94.6 °F (34.8 °C) -- 80 -- 98 % -- --   08/05/21 1130 -- 94.6 °F (34.8 °C) -- 80 -- 98 % -- --   08/05/21 1115 -- 94.5 °F (34.7 °C) -- 80 -- 99 % -- --   08/05/21 1100 -- 94.3 °F (34.6 °C) -- 80 -- 95 % -- --   08/05/21 1045 -- 94.3 °F (34.6 °C) -- 80 -- 99 % -- --   08/05/21 1034 -- 94.5 °F (34.7 °C) -- 80 -- 100 % -- --        Flowsheet Rows      First Filed Value   Admission Height  167.6 cm (66\") Documented at 08/06/2021 0720   Admission Weight  77 kg (169 lb 12.1 oz) Documented at 08/06/2021 0549          Body mass index is 27.4 kg/m².      Intake/Output Summary (Last 24 hours) at 8/6/2021 0919  Last data filed at 8/6/2021 0600  Gross per 24 hour   Intake 2442 ml   Output 2960 ml   Net -518 ml        TELEMETRY:     Paced    Physical Exam:  Vitals reviewed.   Constitutional:       General: Not in acute distress.     Appearance: Normal appearance. Well-developed.   Eyes:      Pupils: Pupils are equal, round, and reactive to light.   HENT:      Head: Normocephalic and atraumatic.   Neck:      Vascular: No JVD.   Pulmonary:      Effort: Pulmonary effort is normal.      Breath sounds: Normal breath sounds.   Cardiovascular:      Normal rate. Regular rhythm.   Pulses:     Intact distal pulses.   Edema:     Peripheral edema absent.   Abdominal:      General: There is no distension.      Palpations: Abdomen is soft.      Tenderness: There is no abdominal tenderness.   Musculoskeletal: Normal range of motion.      Cervical back: " Normal range of motion and neck supple. Skin:     General: Skin is warm and dry.   Neurological:      Mental Status: Alert and oriented to person, place, and time.            Results Review:   I reviewed the patient's new clinical results.    CBC    Results from last 7 days   Lab Units 08/06/21  0608 08/05/21  1855 08/05/21  1635 08/05/21  1532 08/05/21  1234 08/05/21  1131 08/05/21  1049 08/05/21  0723 08/03/21  0936   WBC 10*3/mm3 11.60*  --   --  8.10  --   --  8.00  --  5.40   HEMOGLOBIN g/dL 7.2*  --   --  7.4*  --   --  7.2*  --  9.5*   HEMOGLOBIN, POC g/dL  --  7.1* 8.1*  --  8.0* 7.7*  --    < >  --    PLATELETS 10*3/mm3 167  --   --  160  --   --  155  --  291    < > = values in this interval not displayed.     BMP   Results from last 7 days   Lab Units 08/06/21  0608 08/05/21  1532 08/05/21  1049 08/03/21  0936   SODIUM mmol/L 139 139 138 137   POTASSIUM mmol/L 4.5 4.6 4.7 5.3*   CHLORIDE mmol/L 106 106 105 103   CO2 mmol/L 22.0 23.0 25.0 26.0   BUN mg/dL 17 15 12 11   CREATININE mg/dL 1.28* 1.46* 1.48* 1.25   GLUCOSE mg/dL 127* 151* 98 102*   MAGNESIUM mg/dL 3.1* 2.7* 3.1*  --    PHOSPHORUS mg/dL 5.0* 3.4 4.6*  --      Cr Clearance Estimated Creatinine Clearance: 47.2 mL/min (A) (by C-G formula based on SCr of 1.28 mg/dL (H)).  Coag   Results from last 7 days   Lab Units 08/06/21  0608 08/05/21  1049 08/03/21  0915   INR  1.04 1.19* 0.99   APTT seconds  --  29.2 27.7     HbA1C   Lab Results   Component Value Date    HGBA1C 5.5 08/03/2021     Blood Glucose   Glucose   Date/Time Value Ref Range Status   08/06/2021 0735 110 (H) 70 - 105 mg/dL Final     Comment:     Serial Number: [id number]:  [id number]   08/06/2021 0236 115 (H) 70 - 105 mg/dL Final     Comment:     Serial Number: [id number]:  [id number]   08/06/2021 0018 92 70 - 105 mg/dL Final     Comment:     Serial Number: [id number]:  [id number]   08/05/2021 2059 116 (H) 70 - 105 mg/dL Final     Comment:     Serial Number:  [id number]:  [id number]   08/05/2021 1855 127 (H) 74 - 100 mg/dL Final   08/05/2021 1855 127 (H) 74 - 100 mg/dL Final     Comment:     Serial Number: [id number]:  [id number]   08/05/2021 1758 128 (H) 70 - 105 mg/dL Final     Comment:     Serial Number: [id number]:  [id number]   08/05/2021 1701 136 (H) 70 - 105 mg/dL Final     Comment:     Serial Number: [id number]:  [id number]     Infection   Results from last 7 days   Lab Units 08/03/21  0915   URINECX  No growth     CMP   Results from last 7 days   Lab Units 08/06/21  0608 08/05/21  1532 08/05/21  1049 08/03/21  0936   SODIUM mmol/L 139 139 138 137   POTASSIUM mmol/L 4.5 4.6 4.7 5.3*   CHLORIDE mmol/L 106 106 105 103   CO2 mmol/L 22.0 23.0 25.0 26.0   BUN mg/dL 17 15 12 11   CREATININE mg/dL 1.28* 1.46* 1.48* 1.25   GLUCOSE mg/dL 127* 151* 98 102*   ALBUMIN g/dL 4.40 4.70 4.30 4.10   BILIRUBIN mg/dL  --   --   --  0.3   ALK PHOS U/L  --   --   --  38*   AST (SGOT) U/L  --   --   --  17   ALT (SGPT) U/L  --   --   --  5     ABG    Results from last 7 days   Lab Units 08/05/21  1855 08/05/21  1635 08/05/21  1234 08/05/21  1131 08/05/21  1047 08/05/21  0934 08/05/21  0925   PH, ARTERIAL pH units 7.331* 7.347* 7.381 7.371 7.315* 7.210* 7.170*   PCO2, ARTERIAL mm Hg 45.0 43.0 37.8 40.7 46.7 64.8* 71.2*   PO2 ART mm Hg 93.6 76.4* 96.7 92.3 129.0* 125.0* 148.0*   O2 SATURATION ART % 96.6 94.3 97.4 96.9 98.6* 98.0 99.0*   BASE EXCESS ART mmol/L -2.0* -2.0* -2.4* -1.5* -2.3* <0.0* <0.0*     UA    Results from last 7 days   Lab Units 08/03/21  0915   NITRITE UA  Negative   WBC UA /HPF 0-2   BACTERIA UA /HPF None Seen   SQUAM EPITHEL UA /HPF None Seen   URINECX  No growth     ROLF  No results found for: POCMETH, POCAMPHET, POCBARBITUR, POCBENZO, POCCOCAINE, POCOPIATES, POCOXYCODO, POCPHENCYC, POCPROPOXY, POCTHC, POCTRICYC  Lysis Labs   Results from last 7 days   Lab Units 08/06/21  0608 08/05/21  1855 08/05/21  1635 08/05/21  1532 08/05/21  1234  08/05/21  1131 08/05/21  1049 08/05/21  0723 08/03/21  0936 08/03/21  0915   0000   INR  1.04  --   --   --   --   --  1.19*  --   --  0.99  --    APTT seconds  --   --   --   --   --   --  29.2  --   --  27.7  --    FIBRINOGEN mg/dL  --   --   --   --   --   --  195*  --   --   --   --    HEMOGLOBIN g/dL 7.2*  --   --  7.4*  --   --  7.2*  --  9.5*  --    < >   HEMOGLOBIN, POC g/dL  --  7.1* 8.1*  --  8.0* 7.7*  --    < >  --   --   --    PLATELETS 10*3/mm3 167  --   --  160  --   --  155  --  291  --   --    CREATININE mg/dL 1.28*  --   --  1.46*  --   --  1.48*  --  1.25  --   --     < > = values in this interval not displayed.     Radiology(recent) XR Chest 1 View    Result Date: 8/6/2021  Poorly inflated lungs with bibasilar densities favored to represent atelectasis slightly increased from one day ago. Small right pleural effusion is not excluded. Support lines and tubes are as described above.   Electronically Signed By-Raimundo Friedman DO On:8/6/2021 8:24 AM This report was finalized on [id number] by  Raimundo Friedman DO.    XR Chest 1 View    Result Date: 8/5/2021   1. Interval cardiac surgery with lines and support tubes in place as described. 2. No pneumothorax. 3. Right basilar subsegmental atelectasis with a questionable trace pleural effusion.  Electronically Signed By-Errol Pineda MD On:8/5/2021 12:01 PM This report was finalized on [id number] by  Errol Pineda MD.      Imaging Results (Last 24 Hours)     Procedure Component Value Units Date/Time    XR Chest 1 View [[id number]] Collected: 08/06/21 0822     Updated: 08/06/21 0826    Narrative:      XR CHEST 1 VW-     Date of Exam: 8/6/2021 6:30 AM     Indication: Post-Op Heart Surgery; I35.0-Nonrheumatic aortic (valve)  stenosis  postop day 1     Comparison: 8/5/2021, 8/3/2021     Technique: 1 view(s) of the chest were obtained.     FINDINGS: The lungs are poorly inflated with bibasilar densities  which are platelike in appearance compatible with  atelectasis. Small  right effusion is not excluded. No definite left effusion. No  pneumothorax. Pulmonary vascularity is within upper range of normal.  Pulmonary artery catheter is present with the tip in the expected  location of the right main pulmonary artery, correlation with clinical  parameters. Prosthetic cardiac valve is present. The ET tube and NG tube  have been removed. Mediastinal drains and chest tubes remain in place.       Impression:      Poorly inflated lungs with bibasilar densities favored to represent  atelectasis slightly increased from one day ago. Small right pleural  effusion is not excluded. Support lines and tubes are as described  above.        Electronically Signed By-Raimundo Friedman DO On:8/6/2021 8:24 AM  This report was finalized on [id number] by  Raimundo Friedman DO.                EKG             I personally viewed and interpreted the patient's EKG/Telemetry data:        ECHOCARDIOGRAM:     Results for orders placed during the hospital encounter of 04/27/21    Adult Transesophageal Echo (RAFAT) W/ Cont if Necessary Per Protocol    Interpretation Summary  · Left ventricular systolic function is normal.  · Left ventricular ejection fraction is 60 to 65%  · Left ventricular wall thickness is consistent with mild concentric hypertrophy.  · Left ventricular diastolic function was normal.  · Moderate aortic valve regurgitation is present.  · Severe aortic valve stenosis is present. Aortic valve area is 0.54 cm2.  · Peak velocity of the flow distal to the aortic valve is 483.5 cm/s. Aortic valve maximum pressure gradient is 93.5 mmHg. Aortic valve mean pressure gradient is 47.4 mmHg.  · Saline test results are negative.        STRESS MYOVIEW:      CARDIAC CATHETERIZATION:      OTHER:         Assessment/Plan            Aortic stenosis        ASSESSMENT:    Severe Aortic Stenosis s/p minimally invasive AVR with 25 mm magna pericardial prosthesis  Recent GI bleed with no active bleeding  noted  Anemia  Hgb 7.2  HTN      PLAN:    Continue postoperative care.   Encourage IS and pulmonary toilet  Hemodynamics stable  Off pressors  Will follow           I discussed the patients findings and my recommendations with patient and RN                          RADHA Urban  08/06/21  09:19 EDT

## 2021-08-06 NOTE — CASE MANAGEMENT/SOCIAL WORK
Discharge Planning Assessment  [facility]     Patient Name: Chong Manuel  MRN: [id number]  Today's Date: 8/6/2021    Admit Date: 8/5/2021    Discharge Needs Assessment     Row Name 08/06/21 0911       Living Environment    Lives With  alone    Current Living Arrangements  home/apartment/condo    Able to Return to Prior Arrangements  yes       Resource/Environmental Concerns    Resource/Environmental Concerns  none    Transportation Concerns  car, none       Transition Planning    Patient/Family Anticipates Transition to  home with help/services;inpatient rehabilitation facility    Transportation Anticipated  family or friend will provide       Discharge Needs Assessment    Readmission Within the Last 30 Days  no previous admission in last 30 days    Equipment Currently Used at Home  oxygen        Discharge Plan     Row Name 08/06/21 0912       Plan    Plan  D/C Plan : PT/OT to eval post op , pt has home o2 that he wears PRN    Plan Comments  POD 1 of a AVR .        Continued Care and Services - Admitted Since 8/5/2021    Coordination has not been started for this encounter.         Demographic Summary     Row Name 08/06/21 0911       General Information    Admission Type  inpatient    Arrived From  emergency department    Required Notices Provided  Important Message from Medicare    Preferred Language  English     Used During This Interaction  no        Functional Status     Row Name 08/06/21 0911       Functional Status    Usual Activity Tolerance  moderate    Current Activity Tolerance  moderate       Mental Status    General Appearance WDL  WDL       Mental Status Summary    Recent Changes in Mental Status/Cognitive Functioning  no changes                Malena Wolfe RN

## 2021-08-06 NOTE — PROGRESS NOTES
S/P POD# 1 Mini sternotomy AVR--Pagni  EF 60-65% (RAFAT)    Subjective:  Patient up to chair. Complains of nausea and vomiting.    Drips:  Insulin  CI 2.9, CVP 13      Intake/Output Summary (Last 24 hours) at 8/6/2021 1012  Last data filed at 8/6/2021 0600  Gross per 24 hour   Intake 1092 ml   Output 2960 ml   Net -1868 ml     Temp:  [94.3 °F (34.6 °C)-98.4 °F (36.9 °C)] 98.4 °F (36.9 °C)  Heart Rate:  [61-94] 77  Resp:  [11-19] 11  BP: ()/(43-74) 114/53  Arterial Line BP: ()/(39-53) 136/51  FiO2 (%):  [40 %-70 %] 40 %  /8    Results from last 7 days   Lab Units 08/06/21  0608 08/05/21  1855 08/05/21  1635 08/05/21  1532 08/05/21  1131 08/05/21  1049 08/03/21  0936 08/03/21  0915   WBC 10*3/mm3 11.60*  --   --  8.10   < > 8.00   < >  --    HEMOGLOBIN g/dL 7.2*  --   --  7.4*   < > 7.2*   < >  --    HEMOGLOBIN, POC g/dL  --  7.1*   < >  --    < >  --    < >  --    HEMATOCRIT % 22.0*  --   --  22.8*   < > 21.2*   < >  --    HEMATOCRIT POC %  --  21*   < >  --    < >  --    < >  --    PLATELETS 10*3/mm3 167  --   --  160   < > 155   < >  --    INR  1.04  --   --   --   --  1.19*  --  0.99    < > = values in this interval not displayed.     Results from last 7 days   Lab Units 08/06/21  0608   CREATININE mg/dL 1.28*   POTASSIUM mmol/L 4.5   SODIUM mmol/L 139   MAGNESIUM mg/dL 3.1*   PHOSPHORUS mg/dL 5.0*     ica 1.25    Physical Exam:  Neuro intact, nad, up to chair  Tele:  SR 80's  Diminished bases, 95% 4L NC  dsg c/d/i, No drainage  Benign abd, No BM  No edema    Assessment/Plan:  Principal Problem:    Aortic stenosis    Severe AS s/p AVR-- aspirin  Acute on chronic anemia  HTN--stable  Hx stroke--CT head in May, mild atrophy  Emphysema--add nebs and Pulmicort  Lung nodules--c/w granulomatous disease    Pod #1. Nausea and vomiting overnight. Receiving Zofran and Reglan. Add Scopolamine patch. DC swan and jayro. Add low dose BB. De-escalate.    OBI PHILIP, APRN  8/6/2021  10:12 EDT

## 2021-08-06 NOTE — PLAN OF CARE
Problem: Adult Inpatient Plan of Care  Goal: Plan of Care Review  8/6/2021 1135 by Allyson Hightower PT  Outcome: Ongoing, Progressing  Flowsheets (Taken 8/6/2021 1135)  Plan of Care Reviewed With:   patient   grandchild(eleazar)   son  Outcome Summary: Pt is 77 yo male s/p minimally invasive AVR (mini sternotomy) on 8/5/2021.  PMH: HTN, CVA.  Pt educated on sternal precautions but will need further education on precautions to improve carryover with mobility.  Pt requiring minAx2 for safety with transfers and Clara to ambulate using RW.  Verbal cues needed for following sternal precautions with use of RW and to improve upright postural control.  Pt previously indep with mobility and ADLs living home alone in [facility].  PT spoke with pt's son and grandson present in room on IP rehab vs Home with 24/7 assist of family.  It is anticipated pt will progress toward mobility and be able to d/c home with 24/7 assist of family and HHPT.   PT will follow 5x/week while at [facility] and continue to assess d/c needs pending progress.

## 2021-08-06 NOTE — PLAN OF CARE
Goal Outcome Evaluation:    Pt is a 77 y/o M admitted for preadmission testing prior to scheduled cardiac surgery. PMHx: HTN, emphysema, CVA, lung nodules. Pt is now POD 1 s/p mini sternotomy AVR. Prior to admission, pt lives alone in a SSH with on steps to enter, independent with all ADLs, drives, retired, does not use AD/AE for func mob, able to walk unlimited distances at baseline. This date, pt c/o N/V and N/T in feet since sx. Pt req min A x2 to stand from chair, min Ax1 +1 for func mob using RW with cues for upright posture and to not bear weight through forearms, TD for toileting and LB dressing. Pt educated sternal precautions and req cues to maintain during session. REC home with HH OT, assist from family, and 254/7 hour care. Son and grandson are working on a schedule to stay with patient 24/7 for the first 1-2 weeks. If unable, pt may benefit from short stay at IP rehab.    Sandie Manuel, OTRADHA, OTR

## 2021-08-06 NOTE — THERAPY EVALUATION
Patient Name: Chong Manuel  : 1945    MRN: [id number]                              Today's Date: 2021       Admit Date: 2021    Visit Dx:     ICD-10-CM ICD-9-CM   1. Aortic stenosis  I35.0 424.1     Patient Active Problem List   Diagnosis   • Acute on chronic blood loss anemia   • Essential hypertension   • Hyperlipidemia   • Chronic GERD   • CVA (cerebral vascular accident) (CMS/LTAC, located within [facility])   • Hypertrophy of prostate without urinary obstruction and other lower urinary tract symptoms (LUTS)   • Iron deficiency anemia   • Pure hypercholesterolemia   • Solitary pulmonary nodule   • Shortness of breath   • Chest discomfort   • Aortic stenosis, severe   • Aortic stenosis   • GI bleed     Past Medical History:   Diagnosis Date   • Anemia    • GERD (gastroesophageal reflux disease)    • History of transfusion    • Hyperlipidemia    • Hypertension    • Lung nodules     left    • Shortness of breath 2021   • Stroke (CMS/[facility]AC, located within [facility])      Past Surgical History:   Procedure Laterality Date   • CARDIAC CATHETERIZATION N/A 2021    Procedure: Left Heart Cath;  Surgeon: Darian Fajardo MD;  Location: [facility] CATH INVASIVE LOCATION;  Service: Cardiovascular;  Laterality: N/A;   • CARDIAC CATHETERIZATION N/A 2021    Procedure: Right Heart Cath;  Surgeon: Darian Fajardo MD;  Location: [facility] CATH INVASIVE LOCATION;  Service: Cardiovascular;  Laterality: N/A;   • COLONOSCOPY     • COLONOSCOPY N/A 2021    Procedure: COLONOSCOPY with polypectomy;  Surgeon: CIRA Pope MD;  Location: [facility] ENDOSCOPY;  Service: Gastroenterology;  Laterality: N/A;  post op: diverticulosis, polyp   • ENDOSCOPY N/A 2021    Procedure: ESOPHAGOGASTRODUODENOSCOPY;  Surgeon: CIRA Pope MD;  Location: [facility] ENDOSCOPY;  Service: Gastroenterology;  Laterality: N/A;  HH, gastric polyps   • HERNIA REPAIR       General Information     Row Name 21 1130          OT Time and Intention    Document Type  evaluation   -NA     Mode of Treatment  occupational therapy  -NA     Row Name 08/06/21 1130          General Information    Patient Profile Reviewed  yes  -NA     Prior Level of Function  independent:;feeding;grooming;dressing;bathing;home management;all household mobility;cooking;cleaning;gait;transfer;driving;bed mobility;ADL's;using stairs;shopping  -NA     Existing Precautions/Restrictions  oxygen therapy device and L/min;sternal;fall  -NA     Barriers to Rehab  impaired sensation  -NA     Row Name 08/06/21 1130          Living Environment    Lives With  alone  -NA     Row Name 08/06/21 1130          Home Main Entrance    Number of Stairs, Main Entrance  none  -NA     Stair Railings, Main Entrance  none  -NA     Row Name 08/06/21 1130          Stairs Within Home, Primary    Number of Stairs, Within Home, Primary  none  -NA     Stair Railings, Within Home, Primary  none  -NA     Row Name 08/06/21 1130          Cognition    Orientation Status (Cognition)  oriented x 3  -NA     Row Name 08/06/21 1130          Safety Issues, Functional Mobility    Safety Issues Affecting Function (Mobility)  safety precaution awareness;safety precautions follow-through/compliance;insight into deficits/self-awareness;positioning of assistive device  -NA     Impairments Affecting Function (Mobility)  balance;endurance/activity tolerance;shortness of breath;strength;pain;postural/trunk control  -NA       User Key  (r) = Recorded By, (t) = Taken By, (c) = Cosigned By    Initials Name Provider Type    NA Sandie Manuel OT Occupational Therapist          Mobility/ADL's     Row Name 08/06/21 1131          Bed Mobility    Comment (Bed Mobility)  NT as pt up in chair  -NA     Row Name 08/06/21 1131          Transfers    Transfers  sit-stand transfer  -NA     Sit-Stand [place] (Transfers)  2 person assist;minimum assist (75% patient effort);nonverbal cues (demo/gesture);verbal cues  -NA     Row Name 08/06/21 1131          Sit-Stand Transfer     Assistive Device (Sit-Stand Transfers)  walker, front-wheeled  -[facility] Name 08/06/21 1131          Functional Mobility    Functional Mobility- Ind. Level  verbal cues required;nonverbal cues required (demo/gesture);1 person + 1 person to manage equipment  -     Functional Mobility- Device  rolling walker  -     Functional Mobility- Safety Issues  supplemental O2  -NA     Row Name 08/06/21 1131          Activities of Daily Living    BADL Assessment/Intervention  upper body dressing;grooming;lower body dressing;feeding;toileting  -NA     Row Name 08/06/21 1131          Upper Body Dressing Assessment/Training    [place] Level (Upper Body Dressing)  upper body dressing skills;don;front opening garment;maximum assist (25% patient effort)  -[facility] Name 08/06/21 1131          Grooming Assessment/Training    [place] Level (Grooming)  set up  -     Position (Grooming)  supported standing  -NA     Row Name 08/06/21 1131          Lower Body Dressing Assessment/Training    [place] Level (Lower Body Dressing)  dependent (less than 25% patient effort)  -NA     Row Name 08/06/21 1131          Self-Feeding Assessment/Training    [place] Level (Feeding)  set up  -NA     Row Name 08/06/21 1131          Toileting Assessment/Training    [place] Level (Toileting)  dependent (less than 25% patient effort)  -       User Key  (r) = Recorded By, (t) = Taken By, (c) = Cosigned By    Initials Name Provider Type    NA Sandie Manuel OT Occupational Therapist        Obj/Interventions     [facility] Name 08/06/21 1132          Sensory Assessment (Somatosensory)    Sensory Assessment (Somatosensory)  UE sensation intact  -     Sensory Subjective Reports  numbness;tingling  -NA     Row Name 08/06/21 1132          Vision Assessment/Intervention    Visual Impairment/Limitations  corrective lenses full-time  -NA     Row Name 08/06/21 1132          Range of Motion Comprehensive    General Range of Motion   bilateral upper extremity ROM WFL  -NA     Row Name 08/06/21 1132          Strength Comprehensive (MMT)    General Manual Muscle Testing (MMT) Assessment  upper extremity strength deficits identified  -NA     Comment, General Manual Muscle Testing (MMT) Assessment  grossly 3+/5, not formally tested  -NA     Row Name 08/06/21 1132          Motor Skills    Motor Skills  functional endurance  -NA     Functional Endurance  fair +  -NA       User Key  (r) = Recorded By, (t) = Taken By, (c) = Cosigned By    Initials Name Provider Type    Sandie Perales OT Occupational Therapist        Goals/Plan     Row Name 08/06/21 1133          Transfer Goal 1 (OT)    Activity/Assistive Device (Transfer Goal 1, OT)  toilet  -NA     [place] Level/Cues Needed (Transfer Goal 1, OT)  standby assist  -NA     Time Frame (Transfer Goal 1, OT)  2 weeks  -NA     Row Name 08/06/21 1133          Dressing Goal 1 (OT)    Activity/Device (Dressing Goal 1, OT)  upper body dressing;lower body dressing  -NA     [place]/Cues Needed (Dressing Goal 1, OT)  supervision required  -NA     Time Frame (Dressing Goal 1, OT)  2 weeks  -NA     Row Name 08/06/21 1133          Toileting Goal 1 (OT)    Activity/Device (Toileting Goal 1, OT)  toileting skills, all  -NA     [place] Level/Cues Needed (Toileting Goal 1, OT)  standby assist  -NA     Time Frame (Toileting Goal 1, OT)  2 weeks  -NA     Row Name 08/06/21 1133          Therapy Assessment/Plan (OT)    Planned Therapy Interventions (OT)  activity tolerance training;functional balance retraining;patient/caregiver education/training;neuromuscular control/coordination retraining;ROM/therapeutic exercise;occupation/activity based interventions;strengthening exercise;transfer/mobility retraining  -NA       User Key  (r) = Recorded By, (t) = Taken By, (c) = Cosigned By    Initials Name Provider Type    Sandie Perales OT Occupational Therapist        Clinical Impression     Row Name  08/06/21 1132          Pain Assessment    Additional Documentation  Pain Scale: Numbers Pre/Post-Treatment (Group)  -NA     Row Name 08/06/21 1132          Pain Scale: Numbers Pre/Post-Treatment    Pretreatment Pain Rating  6/10  -NA     Posttreatment Pain Rating  8/10  -NA     Pain Location - Orientation  incisional  -NA     Pre/Posttreatment Pain Comment  chest tube site  -NA     Row Name 08/06/21 1132          Plan of Care Review    Plan of Care Reviewed With  patient;other (see comments);son;grandchild(eleazar) RN, PT Simultaneous filing. User may be unaware of other data.  -NA     Outcome Summary  Pt is a 77 y/o M admitted for preadmission testing prior to scheduled cardiac surgery. PMHx: HTN, emphysema, CVA, lung nodules. Pt is now POD 1 s/p mini sternotomy AVR. Prior to admission, pt lives alone in a SSH with on steps to enter, independent with all ADLs, drives, retired, does not use AD/AE for func mob, able to walk unlimited distances at baseline. This date, pt c/o N/V and N/T in feet since sx. Pt req min A x2 to stand from chair, min Ax1 +1 for func mob using RW with cues for upright posture and to not bear weight through forearms, TD for toileting and LB dressing. Pt educated sternal precautions and req cues to maintain during session. REC home with HH OT, assist from family, and 254/7 hour care. Son and grandson are working on a schedule to stay with patient 24/7 for the first 1-2 weeks. If unable, pt may benefit from short stay at IP rehab. Simultaneous filing. User may be unaware of other data.  -NA     Row Name 08/06/21 1132          Therapy Assessment/Plan (OT)    Rehab Potential (OT)  good, to achieve stated therapy goals  -NA     Criteria for Skilled Therapeutic Interventions Met (OT)  yes;meets criteria;skilled treatment is necessary  -NA     Therapy Frequency (OT)  5 times/wk  -NA     Row Name 08/06/21 1132          Vital Signs    Pre Systolic BP Rehab  105  -NA     Pre Treatment Diastolic BP  48  -NA      Intratreatment Heart Rate (beats/min)  83  -NA     Posttreatment Heart Rate (beats/min)  85  -NA     Pre SpO2 (%)  96  -NA     O2 Delivery Pre Treatment  nasal cannula  -NA     Intra SpO2 (%)  95  -NA     O2 Delivery Intra Treatment  nasal cannula  -NA     Post SpO2 (%)  93  -NA     O2 Delivery Post Treatment  nasal cannula  -NA     Pre Patient Position  Sitting  -NA     Intra Patient Position  Standing  -NA     Post Patient Position  Sitting  -NA     Rest Breaks   1  -NA     Row Name 08/06/21 1132          Positioning and Restraints    Pre-Treatment Position  sitting in chair/recliner  -NA     Post Treatment Position  chair  -NA     In Chair  notified nsg;reclined;call light within reach;encouraged to call for assist;exit alarm on;with family/caregiver;with nsg  -NA       User Key  (r) = Recorded By, (t) = Taken By, (c) = Cosigned By    Initials Name Provider Type    Sandie Perales OT Occupational Therapist        Outcome Measures     Row Name 08/06/21 1134          How much help from another is currently needed...    Putting on and taking off regular lower body clothing?  2  -NA     Bathing (including washing, rinsing, and drying)  2  -NA     Toileting (which includes using toilet bed pan or urinal)  2  -NA     Putting on and taking off regular upper body clothing  3  -NA     Taking care of personal grooming (such as brushing teeth)  3  -NA     Eating meals  4  -NA     AM-PAC 6 Clicks Score (OT)  16  -NA     Row Name 08/06/21 1134 08/06/21 1132       How much help from another person do you currently need...    Turning from your back to your side while in flat bed without using bedrails?  2  -NA  2  -HC    Moving from lying on back to sitting on the side of a flat bed without bedrails?  2  -NA  2  -HC    Moving to and from a bed to a chair (including a wheelchair)?  3  -NA  3  -HC    Standing up from a chair using your arms (e.g., wheelchair, bedside chair)?  3  -NA  3  -HC    Climbing 3-5 steps with a  railing?  2  -NA  2  -HC    To walk in hospital room?  3  -NA  3  -HC    AM-PAC 6 Clicks Score (PT)  15  -NA  15  -HC    Row Name 08/06/21 1134 08/06/21 1132       Functional Assessment    Outcome Measure Options  AM-PAC 6 Clicks Basic Mobility (PT);AM-PAC 6 Clicks Daily Activity (OT)  -NA  AM-PAC 6 Clicks Basic Mobility (PT)  -HC      User Key  (r) = Recorded By, (t) = Taken By, (c) = Cosigned By    Initials Name Provider Type    Sandie Perales, OT Occupational Therapist    HC Allyson Hightower, PT Physical Therapist          Occupational Therapy Education                 Title: PT OT SLP Therapies (In Progress)     Topic: Occupational Therapy (In Progress)     Point: ADL training (In Progress)     Description:   Instruct learner(s) on proper safety adaptation and remediation techniques during self care or transfers.   Instruct in proper use of assistive devices.              Learning Progress Summary           Patient Acceptance, E,TB,D,H, NR by NA at 8/6/2021 1134    Comment: role and purpose of OT, DC REC, using call light, sternal precautions   Family Acceptance, E,TB,D,H, NR by NA at 8/6/2021 1134    Comment: role and purpose of OT, DC REC, using call light, sternal precautions                   Point: Home exercise program (In Progress)     Description:   Instruct learner(s) on appropriate technique for monitoring, assisting and/or progressing therapeutic exercises/activities.              Learning Progress Summary           Patient Acceptance, E,TB,D,H, NR by NA at 8/6/2021 1134    Comment: role and purpose of OT, DC REC, using call light, sternal precautions   Family Acceptance, E,TB,D,H, NR by NA at 8/6/2021 1134    Comment: role and purpose of OT, DC REC, using call light, sternal precautions                   Point: Precautions (In Progress)     Description:   Instruct learner(s) on prescribed precautions during self-care and functional transfers.              Learning Progress Summary            Patient Acceptance, E,TB,D,H, NR by NA at 8/6/2021 1134    Comment: role and purpose of OT, DC REC, using call light, sternal precautions   Family Acceptance, E,TB,D,H, NR by NA at 8/6/2021 1134    Comment: role and purpose of OT, DC REC, using call light, sternal precautions                   Point: Body mechanics (In Progress)     Description:   Instruct learner(s) on proper positioning and spine alignment during self-care, functional mobility activities and/or exercises.              Learning Progress Summary           Patient Acceptance, E,TB,D,H, NR by NA at 8/6/2021 1134    Comment: role and purpose of OT, DC REC, using call light, sternal precautions   Family Acceptance, E,TB,D,H, NR by NA at 8/6/2021 1134    Comment: role and purpose of OT, DC REC, using call light, sternal precautions                               User Key     Initials Effective Dates Name Provider Type Discipline     09/30/20 -  Sandie Manuel OT Occupational Therapist OT              OT Recommendation and Plan  Planned Therapy Interventions (OT): activity tolerance training, functional balance retraining, patient/caregiver education/training, neuromuscular control/coordination retraining, ROM/therapeutic exercise, occupation/activity based interventions, strengthening exercise, transfer/mobility retraining  Therapy Frequency (OT): 5 times/wk  Plan of Care Review  Plan of Care Reviewed With: patient, other (see comments), son, grandchild(eleazar) (RN, PT Simultaneous filing. User may be unaware of other data.)  Outcome Summary: Pt is a 75 y/o M admitted for preadmission testing prior to scheduled cardiac surgery. PMHx: HTN, emphysema, CVA, lung nodules. Pt is now POD 1 s/p mini sternotomy AVR. Prior to admission, pt lives alone in a SSH with on steps to enter, independent with all ADLs, drives, retired, does not use AD/AE for func mob, able to walk unlimited distances at baseline. This date, pt c/o N/V and N/T in feet since sx. Pt req  min A x2 to stand from chair, min Ax1 +1 for func mob using RW with cues for upright posture and to not bear weight through forearms, TD for toileting and LB dressing. Pt educated sternal precautions and req cues to maintain during session. REC home with HH OT, assist from family, and 254/7 hour care. Son and grandson are working on a schedule to stay with patient 24/7 for the first 1-2 weeks. If unable, pt may benefit from short stay at IP rehab. (Simultaneous filing. User may be unaware of other data.)     Time Calculation:   Time Calculation- OT     Row Name 08/06/21 1135             Time Calculation- OT    OT Start Time  1043  -NA      OT Stop Time  1102  -NA      OT Time Calculation (min)  19 min  -NA      Total Timed Code Minutes- OT  19 minute(s)  -NA      OT Received On  08/06/21  -NA      OT - Next Appointment  08/09/21  -NA      OT Goal Re-Cert Due Date  08/20/21  -NA        User Key  (r) = Recorded By, (t) = Taken By, (c) = Cosigned By    Initials Name Provider Type    NA Sandie Manuel OT Occupational Therapist        Therapy Charges for Today     Code Description Service Date Service Provider Modifiers Qty    [id number] HC OT EVAL MOD COMPLEXITY 4 8/6/2021 Sandie Manuel OT GO 1               Sandie Manuel OT  8/6/2021

## 2021-08-07 ENCOUNTER — APPOINTMENT (OUTPATIENT)
Dept: GENERAL RADIOLOGY | Facility: HOSPITAL | Age: 76
End: 2021-08-07

## 2021-08-07 LAB
ABO GROUP BLD: NORMAL
ANION GAP SERPL CALCULATED.3IONS-SCNC: 8 MMOL/L (ref 5–15)
BLD GP AB SCN SERPL QL: NEGATIVE
BUN SERPL-MCNC: 20 MG/DL (ref 8–23)
BUN/CREAT SERPL: 14.8 (ref 7–25)
CALCIUM SPEC-SCNC: 8 MG/DL (ref 8.6–10.5)
CHLORIDE SERPL-SCNC: 102 MMOL/L (ref 98–107)
CO2 SERPL-SCNC: 23 MMOL/L (ref 22–29)
CREAT SERPL-MCNC: 1.35 MG/DL (ref 0.76–1.27)
DEPRECATED RDW RBC AUTO: 56.4 FL (ref 37–54)
ERYTHROCYTE [DISTWIDTH] IN BLOOD BY AUTOMATED COUNT: 19.3 % (ref 12.3–15.4)
GFR SERPL CREATININE-BSD FRML MDRD: 51 ML/MIN/1.73
GLUCOSE BLDC GLUCOMTR-MCNC: 100 MG/DL (ref 70–105)
GLUCOSE BLDC GLUCOMTR-MCNC: 110 MG/DL (ref 70–105)
GLUCOSE BLDC GLUCOMTR-MCNC: 118 MG/DL (ref 70–105)
GLUCOSE BLDC GLUCOMTR-MCNC: 126 MG/DL (ref 70–105)
GLUCOSE BLDC GLUCOMTR-MCNC: 160 MG/DL (ref 70–105)
GLUCOSE SERPL-MCNC: 124 MG/DL (ref 65–99)
HCT VFR BLD AUTO: 20.8 % (ref 37.5–51)
HGB BLD-MCNC: 6.9 G/DL (ref 13–17.7)
MCH RBC QN AUTO: 27.8 PG (ref 26.6–33)
MCHC RBC AUTO-ENTMCNC: 33.5 G/DL (ref 31.5–35.7)
MCV RBC AUTO: 83.2 FL (ref 79–97)
PLATELET # BLD AUTO: 164 10*3/MM3 (ref 140–450)
PMV BLD AUTO: 7.9 FL (ref 6–12)
POTASSIUM SERPL-SCNC: 4.6 MMOL/L (ref 3.5–5.2)
QT INTERVAL: 361 MS
RBC # BLD AUTO: 2.49 10*6/MM3 (ref 4.14–5.8)
RH BLD: NEGATIVE
SODIUM SERPL-SCNC: 133 MMOL/L (ref 136–145)
T&S EXPIRATION DATE: NORMAL
WBC # BLD AUTO: 13.4 10*3/MM3 (ref 3.4–10.8)

## 2021-08-07 PROCEDURE — 36430 TRANSFUSION BLD/BLD COMPNT: CPT

## 2021-08-07 PROCEDURE — 86901 BLOOD TYPING SEROLOGIC RH(D): CPT | Performed by: THORACIC SURGERY (CARDIOTHORACIC VASCULAR SURGERY)

## 2021-08-07 PROCEDURE — 86850 RBC ANTIBODY SCREEN: CPT | Performed by: THORACIC SURGERY (CARDIOTHORACIC VASCULAR SURGERY)

## 2021-08-07 PROCEDURE — 94799 UNLISTED PULMONARY SVC/PX: CPT

## 2021-08-07 PROCEDURE — 86900 BLOOD TYPING SEROLOGIC ABO: CPT

## 2021-08-07 PROCEDURE — 93010 ELECTROCARDIOGRAM REPORT: CPT | Performed by: INTERNAL MEDICINE

## 2021-08-07 PROCEDURE — 86923 COMPATIBILITY TEST ELECTRIC: CPT

## 2021-08-07 PROCEDURE — 25010000002 CEFAZOLIN PER 500 MG: Performed by: NURSE PRACTITIONER

## 2021-08-07 PROCEDURE — 99024 POSTOP FOLLOW-UP VISIT: CPT | Performed by: NURSE PRACTITIONER

## 2021-08-07 PROCEDURE — 93005 ELECTROCARDIOGRAM TRACING: CPT | Performed by: NURSE PRACTITIONER

## 2021-08-07 PROCEDURE — 63710000001 INSULIN LISPRO (HUMAN) PER 5 UNITS: Performed by: NURSE PRACTITIONER

## 2021-08-07 PROCEDURE — 63710000001 INSULIN REGULAR HUMAN PER 5 UNITS: Performed by: NURSE PRACTITIONER

## 2021-08-07 PROCEDURE — P9016 RBC LEUKOCYTES REDUCED: HCPCS

## 2021-08-07 PROCEDURE — 82962 GLUCOSE BLOOD TEST: CPT

## 2021-08-07 PROCEDURE — 25010000002 ENOXAPARIN PER 10 MG: Performed by: NURSE PRACTITIONER

## 2021-08-07 PROCEDURE — 25010000002 FUROSEMIDE PER 20 MG: Performed by: NURSE PRACTITIONER

## 2021-08-07 PROCEDURE — 71045 X-RAY EXAM CHEST 1 VIEW: CPT

## 2021-08-07 PROCEDURE — 99232 SBSQ HOSP IP/OBS MODERATE 35: CPT | Performed by: INTERNAL MEDICINE

## 2021-08-07 PROCEDURE — 25010000002 METOCLOPRAMIDE PER 10 MG: Performed by: THORACIC SURGERY (CARDIOTHORACIC VASCULAR SURGERY)

## 2021-08-07 PROCEDURE — 86900 BLOOD TYPING SEROLOGIC ABO: CPT | Performed by: THORACIC SURGERY (CARDIOTHORACIC VASCULAR SURGERY)

## 2021-08-07 PROCEDURE — 80048 BASIC METABOLIC PNL TOTAL CA: CPT | Performed by: NURSE PRACTITIONER

## 2021-08-07 PROCEDURE — 85027 COMPLETE CBC AUTOMATED: CPT | Performed by: NURSE PRACTITIONER

## 2021-08-07 RX ORDER — NICOTINE POLACRILEX 4 MG
15 LOZENGE BUCCAL
Status: DISCONTINUED | OUTPATIENT
Start: 2021-08-07 | End: 2021-08-10 | Stop reason: HOSPADM

## 2021-08-07 RX ORDER — FUROSEMIDE 10 MG/ML
20 INJECTION INTRAMUSCULAR; INTRAVENOUS ONCE
Status: COMPLETED | OUTPATIENT
Start: 2021-08-07 | End: 2021-08-07

## 2021-08-07 RX ORDER — INSULIN LISPRO 100 [IU]/ML
0-7 INJECTION, SOLUTION INTRAVENOUS; SUBCUTANEOUS
Status: DISCONTINUED | OUTPATIENT
Start: 2021-08-07 | End: 2021-08-10 | Stop reason: HOSPADM

## 2021-08-07 RX ORDER — DEXTROSE MONOHYDRATE 25 G/50ML
25 INJECTION, SOLUTION INTRAVENOUS
Status: DISCONTINUED | OUTPATIENT
Start: 2021-08-07 | End: 2021-08-10 | Stop reason: HOSPADM

## 2021-08-07 RX ORDER — INSULIN LISPRO 100 [IU]/ML
0-7 INJECTION, SOLUTION INTRAVENOUS; SUBCUTANEOUS AS NEEDED
Status: DISCONTINUED | OUTPATIENT
Start: 2021-08-07 | End: 2021-08-10 | Stop reason: HOSPADM

## 2021-08-07 RX ADMIN — METOPROLOL TARTRATE 12.5 MG: 25 TABLET, FILM COATED ORAL at 20:38

## 2021-08-07 RX ADMIN — MUPIROCIN: 20 OINTMENT TOPICAL at 08:13

## 2021-08-07 RX ADMIN — HYDROCODONE BITARTRATE AND ACETAMINOPHEN 1 TABLET: 10; 325 TABLET ORAL at 05:47

## 2021-08-07 RX ADMIN — ENOXAPARIN SODIUM 40 MG: 40 INJECTION SUBCUTANEOUS at 15:43

## 2021-08-07 RX ADMIN — METOCLOPRAMIDE 10 MG: 5 INJECTION, SOLUTION INTRAMUSCULAR; INTRAVENOUS at 08:13

## 2021-08-07 RX ADMIN — BUDESONIDE 0.5 MG: 0.5 SUSPENSION RESPIRATORY (INHALATION) at 07:57

## 2021-08-07 RX ADMIN — GABAPENTIN 100 MG: 100 CAPSULE ORAL at 08:13

## 2021-08-07 RX ADMIN — ACETAMINOPHEN 650 MG: 325 TABLET, FILM COATED ORAL at 20:45

## 2021-08-07 RX ADMIN — BUDESONIDE 0.5 MG: 0.5 SUSPENSION RESPIRATORY (INHALATION) at 20:29

## 2021-08-07 RX ADMIN — GABAPENTIN 100 MG: 100 CAPSULE ORAL at 20:38

## 2021-08-07 RX ADMIN — SODIUM CHLORIDE 0.4 UNITS/HR: 9 INJECTION, SOLUTION INTRAVENOUS at 06:28

## 2021-08-07 RX ADMIN — FUROSEMIDE 20 MG: 10 INJECTION, SOLUTION INTRAMUSCULAR; INTRAVENOUS at 11:43

## 2021-08-07 RX ADMIN — ASPIRIN 81 MG: 81 TABLET, COATED ORAL at 08:13

## 2021-08-07 RX ADMIN — METOCLOPRAMIDE 10 MG: 5 INJECTION, SOLUTION INTRAMUSCULAR; INTRAVENOUS at 02:47

## 2021-08-07 RX ADMIN — MUPIROCIN: 20 OINTMENT TOPICAL at 20:38

## 2021-08-07 RX ADMIN — PANTOPRAZOLE SODIUM 40 MG: 40 TABLET, DELAYED RELEASE ORAL at 05:47

## 2021-08-07 RX ADMIN — IPRATROPIUM BROMIDE AND ALBUTEROL SULFATE 3 ML: 2.5; .5 SOLUTION RESPIRATORY (INHALATION) at 20:29

## 2021-08-07 RX ADMIN — SODIUM CHLORIDE 30 ML/HR: 9 INJECTION, SOLUTION INTRAVENOUS at 06:28

## 2021-08-07 RX ADMIN — GABAPENTIN 100 MG: 100 CAPSULE ORAL at 15:44

## 2021-08-07 RX ADMIN — METOCLOPRAMIDE 10 MG: 5 INJECTION, SOLUTION INTRAMUSCULAR; INTRAVENOUS at 20:38

## 2021-08-07 RX ADMIN — ATORVASTATIN CALCIUM 40 MG: 40 TABLET, FILM COATED ORAL at 20:38

## 2021-08-07 RX ADMIN — DOCUSATE SODIUM 50 MG AND SENNOSIDES 8.6 MG 2 TABLET: 8.6; 5 TABLET, FILM COATED ORAL at 20:38

## 2021-08-07 RX ADMIN — CEFAZOLIN SODIUM 2 G: 10 INJECTION, POWDER, FOR SOLUTION INTRAVENOUS at 02:47

## 2021-08-07 RX ADMIN — DOCUSATE SODIUM 50 MG AND SENNOSIDES 8.6 MG 2 TABLET: 8.6; 5 TABLET, FILM COATED ORAL at 08:13

## 2021-08-07 RX ADMIN — CHLORHEXIDINE GLUCONATE 15 ML: 1.2 SOLUTION ORAL at 08:13

## 2021-08-07 RX ADMIN — METOCLOPRAMIDE 10 MG: 5 INJECTION, SOLUTION INTRAMUSCULAR; INTRAVENOUS at 15:44

## 2021-08-07 RX ADMIN — INSULIN LISPRO 2 UNITS: 100 INJECTION, SOLUTION INTRAVENOUS; SUBCUTANEOUS at 11:42

## 2021-08-07 RX ADMIN — IPRATROPIUM BROMIDE AND ALBUTEROL SULFATE 3 ML: 2.5; .5 SOLUTION RESPIRATORY (INHALATION) at 16:20

## 2021-08-07 RX ADMIN — METOPROLOL TARTRATE 12.5 MG: 25 TABLET, FILM COATED ORAL at 10:39

## 2021-08-07 RX ADMIN — IPRATROPIUM BROMIDE AND ALBUTEROL SULFATE 3 ML: 2.5; .5 SOLUTION RESPIRATORY (INHALATION) at 07:57

## 2021-08-07 RX ADMIN — CHLORHEXIDINE GLUCONATE 15 ML: 1.2 SOLUTION ORAL at 20:38

## 2021-08-07 NOTE — PROGRESS NOTES
CC--aortic stenosis status post surgery doing well  Chronic kidney disease      Sub--no symptoms and patient denies any angina or dyspnea    Past Medical History:   Diagnosis Date   • Anemia    • GERD (gastroesophageal reflux disease)    • History of transfusion    • Hyperlipidemia    • Hypertension    • Lung nodules     left    • Shortness of breath 4/19/2021   • Stroke (CMS/HCC) 2011     Past Surgical History:   Procedure Laterality Date   • CARDIAC CATHETERIZATION N/A 4/27/2021    Procedure: Left Heart Cath;  Surgeon: Darian Fajardo MD;  Location: [facility] CATH INVASIVE LOCATION;  Service: Cardiovascular;  Laterality: N/A;   • CARDIAC CATHETERIZATION N/A 4/27/2021    Procedure: Right Heart Cath;  Surgeon: Darian Fajardo MD;  Location: [facility] CATH INVASIVE LOCATION;  Service: Cardiovascular;  Laterality: N/A;   • COLONOSCOPY     • COLONOSCOPY N/A 7/27/2021    Procedure: COLONOSCOPY with polypectomy;  Surgeon: CIRA Pope MD;  Location: [facility] ENDOSCOPY;  Service: Gastroenterology;  Laterality: N/A;  post op: diverticulosis, polyp   • ENDOSCOPY N/A 7/26/2021    Procedure: ESOPHAGOGASTRODUODENOSCOPY;  Surgeon: CIRA Pope MD;  Location: [facility] ENDOSCOPY;  Service: Gastroenterology;  Laterality: N/A;  HH, gastric polyps   • HERNIA REPAIR       Family History   Problem Relation Age of Onset   • Heart attack Mother    • Heart failure Mother      Social History     Tobacco Use   • Smoking status: Former Smoker   • Smokeless tobacco: Never Used   Vaping Use   • Vaping Use: Never used   Substance Use Topics   • Alcohol use: No   • Drug use: No     Review of Systems   General:  positive for fatigue and tiredness  Eyes: No redness  Cardiovascular: No chest pain, no palpitations  Respiratory:   positive for class 2 shortness of breath  Gastrointestinal: No nausea or vomiting, bleeding  Genitourinary: no hematuria or dysuria  Musculoskeletal: No arthralgia or myalgia  Skin: No rash  Neurologic: No numbness,  tingling, syncope  Hematologic/Lymphatic: No abnormal bleeding      Physical Exam  VITALS REVIEWED--blood pressure 153/50 pulse rate 78 patient is afebrile respiration 12 times a minute    General:      well developed, well nourished, in no acute distress.    Head:      normocephalic and atraumatic.    Eyes:      PERRL/EOM intact, conjunctiva and sclera clear with out nystagmus.    Neck:      no masses, thyromegaly,  trachea central with normal respiratory effort   Lungs:     Reduced breath sounds bilateral bases     Heart:       Sinus rhythm without any significant murmurs or gallops  Pulses:      pulses normal in all 4 extremities.    Extremities:       no cyanosis or clubbing--trace left pedal edema and trace right pedal edema.    Neurologic:      no focal deficits.   alert oriented x3  Skin:      intact without lesions or rashes.    Psych:      alert and cooperative; normal mood and affect; normal attention span and concentration.          CBC    Results from last 7 days   Lab Units 08/07/21  0405 08/06/21  0608 08/05/21  1855 08/05/21  1635 08/05/21  1532 08/05/21  1234 08/05/21  1131 08/05/21  1049 08/05/21  1047 08/05/21  0723 08/03/21  0936   WBC 10*3/mm3 13.40* 11.60*  --   --  8.10  --   --  8.00  --   --  5.40   HEMOGLOBIN g/dL 6.9* 7.2*  --   --  7.4*  --   --  7.2*  --   --  9.5*   HEMOGLOBIN, POC g/dL  --   --  7.1* 8.1*  --  8.0* 7.7*  --    < >   < >  --    PLATELETS 10*3/mm3 164 167  --   --  160  --   --  155  --   --  291    < > = values in this interval not displayed.     BMP   Results from last 7 days   Lab Units 08/07/21  0405 08/06/21  0608 08/05/21  1532 08/05/21  1049 08/03/21  0936   SODIUM mmol/L 133* 139 139 138 137   POTASSIUM mmol/L 4.6 4.5 4.6 4.7 5.3*   CHLORIDE mmol/L 102 106 106 105 103   CO2 mmol/L 23.0 22.0 23.0 25.0 26.0   BUN mg/dL 20 17 15 12 11   CREATININE mg/dL 1.35* 1.28* 1.46* 1.48* 1.25   GLUCOSE mg/dL 124* 127* 151* 98 102*   MAGNESIUM mg/dL  --  3.1* 2.7* 3.1*  --     PHOSPHORUS mg/dL  --  5.0* 3.4 4.6*  --      Radiology(recent) XR Chest 1 View    Result Date: 8/7/2021  1 small bibasilar infiltrates and pleural effusions unchanged.  Electronically Signed By-Ray Patton MD On:8/7/2021 9:52 AM This report was finalized on [id number] by  Ray Patton MD.    XR Chest 1 View    Result Date: 8/6/2021  1. Removal of the [place] catheter and midline drain. No pneumothorax. 2. Central vascular congestion and interstitial edema pattern. 3. Bibasilar airspace opacities and likely small bilateral pleural effusions.  Electronically Signed By-Chivo Hansen MD On:8/6/2021 7:07 PM This report was finalized on [id number] by  Chivo Hansen MD.    XR Chest 1 View    Result Date: 8/6/2021  Poorly inflated lungs with bibasilar densities favored to represent atelectasis slightly increased from one day ago. Small right pleural effusion is not excluded. Support lines and tubes are as described above.   Electronically Signed By-Raimundo Friedman DO On:8/6/2021 8:24 AM This report was finalized on [id number] by  Raimundo Friedman DO.        Assessment plan    Severe aortic stenosis status post bioprosthetic AVR postop day 2 doing well  Anemia  Hypertension  Chronic kidney disease mild  Prior history of stroke and emphysema  Clinically stable and agree with gentle diuresis    Electronically signed by Gera Jensen MD, 08/07/21, 12:39 PM EDT.

## 2021-08-07 NOTE — PROGRESS NOTES
" LOS: 2 days   Patient Care Team:  Garcia Johnson MD as PCP - General (Family Medicine)    Chief Complaint: post op fu    Subjective:  Symptoms:  No shortness of breath or chest pain.    Diet:  No nausea.    Pain:  He reports no pain.          Vital Signs  Temp:  [97.7 °F (36.5 °C)-98.6 °F (37 °C)] 98 °F (36.7 °C)  Heart Rate:  [66-95] 78  Resp:  [8-17] 14  BP: ()/(44-64) 98/52  Arterial Line BP: (117-164)/(41-48) 153/42  FiO2 (%):  [60 %] 60 %  Body mass index is 26.33 kg/m².    Intake/Output Summary (Last 24 hours) at 8/7/2021 0834  Last data filed at 8/7/2021 0600  Gross per 24 hour   Intake 986 ml   Output 1655 ml   Net -669 ml     No intake/output data recorded.        08/06/21  0549 08/06/21  0720 08/07/21  0500   Weight: 77 kg (169 lb 12.1 oz) 77 kg (169 lb 12.1 oz) 74 kg (163 lb 2.3 oz)         Objective:  General Appearance:  Comfortable.    Vital signs: (most recent): Blood pressure 98/52, pulse 78, temperature 98 °F (36.7 °C), temperature source Oral, resp. rate 14, height 167.6 cm (66\"), weight 74 kg (163 lb 2.3 oz), SpO2 94 %.    Output: Producing urine.    Lungs:  Normal effort and normal respiratory rate.  (O2 at 3 liters)  Heart: Normal rate.  Regular rhythm.    Neurological: Patient is alert and oriented to person, place and time.    Skin:  Warm and dry.  (Stable sternum)            Results Review:        WBC WBC   Date Value Ref Range Status   08/07/2021 13.40 (H) 3.40 - 10.80 10*3/mm3 Final   08/06/2021 11.60 (H) 3.40 - 10.80 10*3/mm3 Final   08/05/2021 8.10 3.40 - 10.80 10*3/mm3 Final   08/05/2021 8.00 3.40 - 10.80 10*3/mm3 Final      HGB Hemoglobin   Date Value Ref Range Status   08/07/2021 6.9 (C) 13.0 - 17.7 g/dL Final   08/06/2021 7.2 (L) 13.0 - 17.7 g/dL Final   08/05/2021 7.1 (L) 12.0 - 17.0 g/dL Final   08/05/2021 8.1 (L) 12.0 - 17.0 g/dL Final   08/05/2021 7.4 (L) 13.0 - 17.7 g/dL Final   08/05/2021 8.0 (L) 12.0 - 17.0 g/dL Final   08/05/2021 7.7 (L) 12.0 - 17.0 g/dL Final "   08/05/2021 7.2 (L) 13.0 - 17.7 g/dL Final   08/05/2021 7.4 (L) 12.0 - 17.0 g/dL Final   08/05/2021 6.5 (C) 12.0 - 17.0 g/dL Final   08/05/2021 6.8 (C) 12.0 - 17.0 g/dL Final   08/05/2021 8.2 (L) 12.0 - 17.0 g/dL Final   08/05/2021 7.1 (C) 12.0 - 17.0 g/dL Final   08/05/2021 6.8 (C) 12.0 - 17.0 g/dL Final   08/05/2021 7.8 (C) 12.0 - 17.0 g/dL Final      HCT Hematocrit   Date Value Ref Range Status   08/07/2021 20.8 (C) 37.5 - 51.0 % Final   08/06/2021 22.0 (L) 37.5 - 51.0 % Final   08/05/2021 21 (L) 38 - 51 % Final   08/05/2021 24 (L) 38 - 51 % Final   08/05/2021 22.8 (L) 37.5 - 51.0 % Final   08/05/2021 23 (L) 38 - 51 % Final   08/05/2021 23 (L) 38 - 51 % Final   08/05/2021 21.2 (L) 37.5 - 51.0 % Final   08/05/2021 22 (L) 38 - 51 % Final   08/05/2021 19 (L) 38 - 51 % Final   08/05/2021 20 (L) 38 - 51 % Final   08/05/2021 24 (L) 38 - 51 % Final   08/05/2021 21 (L) 38 - 51 % Final   08/05/2021 20 (L) 38 - 51 % Final   08/05/2021 23 (L) 38 - 51 % Final      Platelets Platelets   Date Value Ref Range Status   08/07/2021 164 140 - 450 10*3/mm3 Final     Comment:     Result checked    08/06/2021 167 140 - 450 10*3/mm3 Final   08/05/2021 160 140 - 450 10*3/mm3 Final   08/05/2021 155 140 - 450 10*3/mm3 Final        PT/INR:    Protime   Date Value Ref Range Status   08/06/2021 11.5 9.6 - 11.7 Seconds Final   08/05/2021 13.0 (H) 9.6 - 11.7 Seconds Final   /  INR   Date Value Ref Range Status   08/06/2021 1.04 0.93 - 1.10 Final   08/05/2021 1.19 (H) 0.93 - 1.10 Final       Sodium Sodium   Date Value Ref Range Status   08/07/2021 133 (L) 136 - 145 mmol/L Final   08/06/2021 139 136 - 145 mmol/L Final   08/05/2021 139 136 - 145 mmol/L Final   08/05/2021 138 136 - 145 mmol/L Final      Potassium Potassium   Date Value Ref Range Status   08/07/2021 4.6 3.5 - 5.2 mmol/L Final   08/06/2021 4.5 3.5 - 5.2 mmol/L Final   08/05/2021 4.6 3.5 - 5.2 mmol/L Final   08/05/2021 4.7 3.5 - 5.2 mmol/L Final      Chloride Chloride   Date Value  Ref Range Status   08/07/2021 102 98 - 107 mmol/L Final   08/06/2021 106 98 - 107 mmol/L Final   08/05/2021 106 98 - 107 mmol/L Final   08/05/2021 105 98 - 107 mmol/L Final      Bicarbonate CO2   Date Value Ref Range Status   08/07/2021 23.0 22.0 - 29.0 mmol/L Final   08/06/2021 22.0 22.0 - 29.0 mmol/L Final   08/05/2021 23.0 22.0 - 29.0 mmol/L Final   08/05/2021 25.0 22.0 - 29.0 mmol/L Final      BUN BUN   Date Value Ref Range Status   08/07/2021 20 8 - 23 mg/dL Final   08/06/2021 17 8 - 23 mg/dL Final   08/05/2021 15 8 - 23 mg/dL Final   08/05/2021 12 8 - 23 mg/dL Final      Creatinine Creatinine   Date Value Ref Range Status   08/07/2021 1.35 (H) 0.76 - 1.27 mg/dL Final   08/06/2021 1.28 (H) 0.76 - 1.27 mg/dL Final   08/05/2021 1.46 (H) 0.76 - 1.27 mg/dL Final   08/05/2021 1.48 (H) 0.76 - 1.27 mg/dL Final      Calcium Calcium   Date Value Ref Range Status   08/07/2021 8.0 (L) 8.6 - 10.5 mg/dL Final   08/06/2021 8.8 8.6 - 10.5 mg/dL Final   08/05/2021 9.5 8.6 - 10.5 mg/dL Final   08/05/2021 9.5 8.6 - 10.5 mg/dL Final      Magnesium Magnesium   Date Value Ref Range Status   08/06/2021 3.1 (H) 1.6 - 2.4 mg/dL Final   08/05/2021 2.7 (H) 1.6 - 2.4 mg/dL Final   08/05/2021 3.1 (H) 1.6 - 2.4 mg/dL Final      Troponin No results found for: TROPONIN   BNP No results found for: BNP         aspirin, 81 mg, Oral, Daily  aspirin, 325 mg, Oral, Once  atorvastatin, 40 mg, Oral, Nightly  budesonide, 0.5 mg, Nebulization, BID - RT  chlorhexidine, 15 mL, Mouth/Throat, Q12H  enoxaparin, 40 mg, Subcutaneous, Daily  gabapentin, 100 mg, Oral, TID  ipratropium-albuterol, 3 mL, Nebulization, 4x Daily - RT  metoclopramide, 10 mg, Intravenous, Q6H  metoprolol tartrate, 12.5 mg, Oral, Q12H  mupirocin, , Each Nare, BID  pantoprazole, 40 mg, Oral, QAM  Scopolamine, 1 patch, Transdermal, Q72H  senna-docusate sodium, 2 tablet, Oral, BID      dexmedetomidine, 0.2-1.5 mcg/kg/hr, Last Rate: 0.1 mcg/kg/hr (08/05/21 1450)  insulin, 0-50 Units/hr,  Last Rate: 0.4 Units/hr (08/07/21 0628)  niCARdipine, 5-15 mg/hr, Last Rate: Stopped (08/06/21 0200)  nitroglycerin, 5-200 mcg/min, Last Rate: Stopped (08/05/21 1115)  propofol, 5-50 mcg/kg/min  sodium chloride, 30 mL/hr, Last Rate: 30 mL/hr (08/07/21 0628)        PRN Meds:.•  acetaminophen **OR** acetaminophen **OR** acetaminophen  •  bisacodyl  •  bisacodyl  •  HYDROcodone-acetaminophen  •  HYDROcodone-acetaminophen  •  insulin  •  magnesium hydroxide  •  Morphine **AND** naloxone  •  niCARdipine  •  ondansetron  •  polyethylene glycol  •  potassium chloride **OR** potassium chloride  •  propofol  •  sodium chloride    Patient Active Problem List   Diagnosis Code   • Acute on chronic blood loss anemia D62   • Essential hypertension I10   • Hyperlipidemia E78.5   • Chronic GERD K21.9   • CVA (cerebral vascular accident) (CMS/HCC) I63.9   • Hypertrophy of prostate without urinary obstruction and other lower urinary tract symptoms (LUTS) N40.0   • Iron deficiency anemia D50.9   • Pure hypercholesterolemia E78.00   • Solitary pulmonary nodule R91.1   • Shortness of breath R06.02   • Chest discomfort R07.89   • Aortic stenosis, severe I35.0   • Aortic stenosis I35.0   • GI bleed K92.2       Assessment & Plan    -Severe AS, EF 60-65% s/p mini AVR----POD #2 (Pagni)  -acute on chronic anemia of unknown source, preop Hb 9.5----1 unit PRBC this am  -HTN--stable  -CKD (preop creatinine 1.2-1.5)  -Hx stroke--CT head in May, mild atrophy  -Emphysema--per CT scan, can not r/o ILD, DLCO 26  -Lung nodules--c/w granulomatous disease  -Post op nausea----scopalamine  -post op leukocytosis----reactive    Give lasix 20 mg IV after transfusion, recheck CVP after, wean O2 as tolerated.  Keep cordis and wires for now.    RADHA Foster  08/07/21  08:34 EDT

## 2021-08-07 NOTE — PLAN OF CARE
Problem: Adult Inpatient Plan of Care  Goal: Plan of Care Review  Outcome: Ongoing, Progressing   Goal Outcome Evaluation:

## 2021-08-08 ENCOUNTER — APPOINTMENT (OUTPATIENT)
Dept: GENERAL RADIOLOGY | Facility: HOSPITAL | Age: 76
End: 2021-08-08

## 2021-08-08 LAB
ANION GAP SERPL CALCULATED.3IONS-SCNC: 9 MMOL/L (ref 5–15)
ARTERIAL PATENCY WRIST A: POSITIVE
ATMOSPHERIC PRESS: ABNORMAL MM[HG]
BASE EXCESS BLDA CALC-SCNC: -3.2 MMOL/L (ref 0–3)
BDY SITE: ABNORMAL
BH BB BLOOD EXPIRATION DATE: NORMAL
BH BB BLOOD TYPE BARCODE: 9500
BH BB DISPENSE STATUS: NORMAL
BH BB PRODUCT CODE: NORMAL
BH BB UNIT NUMBER: NORMAL
BUN SERPL-MCNC: 25 MG/DL (ref 8–23)
BUN/CREAT SERPL: 22.7 (ref 7–25)
CA-I BLDA-SCNC: 0.92 MMOL/L (ref 1.15–1.33)
CALCIUM SPEC-SCNC: 8.1 MG/DL (ref 8.6–10.5)
CHLORIDE SERPL-SCNC: 99 MMOL/L (ref 98–107)
CO2 BLDA-SCNC: 21.7 MMOL/L (ref 22–29)
CO2 SERPL-SCNC: 23 MMOL/L (ref 22–29)
CREAT SERPL-MCNC: 1.1 MG/DL (ref 0.76–1.27)
CROSSMATCH INTERPRETATION: NORMAL
DEPRECATED RDW RBC AUTO: 52.9 FL (ref 37–54)
ERYTHROCYTE [DISTWIDTH] IN BLOOD BY AUTOMATED COUNT: 18.4 % (ref 12.3–15.4)
GFR SERPL CREATININE-BSD FRML MDRD: 65 ML/MIN/1.73
GLUCOSE BLDC GLUCOMTR-MCNC: 128 MG/DL (ref 70–105)
GLUCOSE BLDC GLUCOMTR-MCNC: 138 MG/DL (ref 70–105)
GLUCOSE BLDC GLUCOMTR-MCNC: 156 MG/DL (ref 70–105)
GLUCOSE BLDC GLUCOMTR-MCNC: 198 MG/DL (ref 74–100)
GLUCOSE BLDC GLUCOMTR-MCNC: 198 MG/DL (ref 74–100)
GLUCOSE SERPL-MCNC: 117 MG/DL (ref 65–99)
HCO3 BLDA-SCNC: 20.7 MMOL/L (ref 21–28)
HCT VFR BLD AUTO: 23.1 % (ref 37.5–51)
HCT VFR BLD AUTO: 26.5 % (ref 37.5–51)
HCT VFR BLDA CALC: 30 % (ref 38–51)
HEMODILUTION: NO
HGB BLD-MCNC: 7.6 G/DL (ref 13–17.7)
HGB BLD-MCNC: 9 G/DL (ref 13–17.7)
HGB BLDA-MCNC: 10.1 G/DL (ref 12–17)
INHALED O2 CONCENTRATION: 40 %
MCH RBC QN AUTO: 27.2 PG (ref 26.6–33)
MCHC RBC AUTO-ENTMCNC: 33.1 G/DL (ref 31.5–35.7)
MCV RBC AUTO: 82.1 FL (ref 79–97)
MODALITY: ABNORMAL
PCO2 BLDA: 32.1 MM HG (ref 35–48)
PH BLDA: 7.42 PH UNITS (ref 7.35–7.45)
PLATELET # BLD AUTO: 158 10*3/MM3 (ref 140–450)
PMV BLD AUTO: 7.6 FL (ref 6–12)
PO2 BLDA: 69.5 MM HG (ref 83–108)
POTASSIUM BLDA-SCNC: 3.8 MMOL/L (ref 3.5–4.5)
POTASSIUM SERPL-SCNC: 4.4 MMOL/L (ref 3.5–5.2)
RBC # BLD AUTO: 2.81 10*6/MM3 (ref 4.14–5.8)
SAO2 % BLDCOA: 94.2 % (ref 94–98)
SODIUM BLD-SCNC: 131 MMOL/L (ref 138–146)
SODIUM SERPL-SCNC: 131 MMOL/L (ref 136–145)
UNIT  ABO: NORMAL
UNIT  RH: NORMAL
WBC # BLD AUTO: 11.2 10*3/MM3 (ref 3.4–10.8)

## 2021-08-08 PROCEDURE — 82962 GLUCOSE BLOOD TEST: CPT

## 2021-08-08 PROCEDURE — 97110 THERAPEUTIC EXERCISES: CPT

## 2021-08-08 PROCEDURE — 85018 HEMOGLOBIN: CPT | Performed by: THORACIC SURGERY (CARDIOTHORACIC VASCULAR SURGERY)

## 2021-08-08 PROCEDURE — 99232 SBSQ HOSP IP/OBS MODERATE 35: CPT | Performed by: INTERNAL MEDICINE

## 2021-08-08 PROCEDURE — 80048 BASIC METABOLIC PNL TOTAL CA: CPT | Performed by: NURSE PRACTITIONER

## 2021-08-08 PROCEDURE — 80051 ELECTROLYTE PANEL: CPT

## 2021-08-08 PROCEDURE — 82803 BLOOD GASES ANY COMBINATION: CPT

## 2021-08-08 PROCEDURE — P9016 RBC LEUKOCYTES REDUCED: HCPCS

## 2021-08-08 PROCEDURE — 99024 POSTOP FOLLOW-UP VISIT: CPT | Performed by: THORACIC SURGERY (CARDIOTHORACIC VASCULAR SURGERY)

## 2021-08-08 PROCEDURE — 85018 HEMOGLOBIN: CPT

## 2021-08-08 PROCEDURE — 85027 COMPLETE CBC AUTOMATED: CPT | Performed by: NURSE PRACTITIONER

## 2021-08-08 PROCEDURE — 25010000002 METOCLOPRAMIDE PER 10 MG: Performed by: THORACIC SURGERY (CARDIOTHORACIC VASCULAR SURGERY)

## 2021-08-08 PROCEDURE — 94799 UNLISTED PULMONARY SVC/PX: CPT

## 2021-08-08 PROCEDURE — 97112 NEUROMUSCULAR REEDUCATION: CPT

## 2021-08-08 PROCEDURE — 63710000001 INSULIN LISPRO (HUMAN) PER 5 UNITS: Performed by: NURSE PRACTITIONER

## 2021-08-08 PROCEDURE — 82330 ASSAY OF CALCIUM: CPT

## 2021-08-08 PROCEDURE — 71045 X-RAY EXAM CHEST 1 VIEW: CPT

## 2021-08-08 PROCEDURE — 85014 HEMATOCRIT: CPT | Performed by: THORACIC SURGERY (CARDIOTHORACIC VASCULAR SURGERY)

## 2021-08-08 PROCEDURE — 86900 BLOOD TYPING SEROLOGIC ABO: CPT

## 2021-08-08 PROCEDURE — 36430 TRANSFUSION BLD/BLD COMPNT: CPT

## 2021-08-08 PROCEDURE — 36600 WITHDRAWAL OF ARTERIAL BLOOD: CPT

## 2021-08-08 RX ORDER — FERROUS SULFATE TAB EC 324 MG (65 MG FE EQUIVALENT) 324 (65 FE) MG
324 TABLET DELAYED RESPONSE ORAL
Status: DISCONTINUED | OUTPATIENT
Start: 2021-08-08 | End: 2021-08-10 | Stop reason: HOSPADM

## 2021-08-08 RX ADMIN — FERROUS SULFATE TAB EC 324 MG (65 MG FE EQUIVALENT) 324 MG: 324 (65 FE) TABLET DELAYED RESPONSE at 11:28

## 2021-08-08 RX ADMIN — IPRATROPIUM BROMIDE AND ALBUTEROL SULFATE 3 ML: 2.5; .5 SOLUTION RESPIRATORY (INHALATION) at 12:24

## 2021-08-08 RX ADMIN — CHLORHEXIDINE GLUCONATE 15 ML: 1.2 SOLUTION ORAL at 21:40

## 2021-08-08 RX ADMIN — METOCLOPRAMIDE 10 MG: 5 INJECTION, SOLUTION INTRAMUSCULAR; INTRAVENOUS at 21:42

## 2021-08-08 RX ADMIN — METOCLOPRAMIDE 10 MG: 5 INJECTION, SOLUTION INTRAMUSCULAR; INTRAVENOUS at 04:05

## 2021-08-08 RX ADMIN — METOPROLOL TARTRATE 12.5 MG: 25 TABLET, FILM COATED ORAL at 21:42

## 2021-08-08 RX ADMIN — GABAPENTIN 100 MG: 100 CAPSULE ORAL at 15:43

## 2021-08-08 RX ADMIN — INSULIN LISPRO 2 UNITS: 100 INJECTION, SOLUTION INTRAVENOUS; SUBCUTANEOUS at 11:26

## 2021-08-08 RX ADMIN — POLYETHYLENE GLYCOL 3350 17 G: 17 POWDER, FOR SOLUTION ORAL at 21:42

## 2021-08-08 RX ADMIN — DOCUSATE SODIUM 50 MG AND SENNOSIDES 8.6 MG 2 TABLET: 8.6; 5 TABLET, FILM COATED ORAL at 21:40

## 2021-08-08 RX ADMIN — METOPROLOL TARTRATE 12.5 MG: 25 TABLET, FILM COATED ORAL at 08:07

## 2021-08-08 RX ADMIN — BUDESONIDE 0.5 MG: 0.5 SUSPENSION RESPIRATORY (INHALATION) at 09:00

## 2021-08-08 RX ADMIN — IPRATROPIUM BROMIDE AND ALBUTEROL SULFATE 3 ML: 2.5; .5 SOLUTION RESPIRATORY (INHALATION) at 20:42

## 2021-08-08 RX ADMIN — DOCUSATE SODIUM 50 MG AND SENNOSIDES 8.6 MG 2 TABLET: 8.6; 5 TABLET, FILM COATED ORAL at 08:07

## 2021-08-08 RX ADMIN — PANTOPRAZOLE SODIUM 40 MG: 40 TABLET, DELAYED RELEASE ORAL at 06:24

## 2021-08-08 RX ADMIN — MUPIROCIN: 20 OINTMENT TOPICAL at 21:40

## 2021-08-08 RX ADMIN — BUDESONIDE 0.5 MG: 0.5 SUSPENSION RESPIRATORY (INHALATION) at 20:47

## 2021-08-08 RX ADMIN — GABAPENTIN 100 MG: 100 CAPSULE ORAL at 08:07

## 2021-08-08 RX ADMIN — GABAPENTIN 100 MG: 100 CAPSULE ORAL at 21:44

## 2021-08-08 RX ADMIN — ASPIRIN 81 MG: 81 TABLET, COATED ORAL at 08:07

## 2021-08-08 RX ADMIN — IPRATROPIUM BROMIDE AND ALBUTEROL SULFATE 3 ML: 2.5; .5 SOLUTION RESPIRATORY (INHALATION) at 15:13

## 2021-08-08 RX ADMIN — METOCLOPRAMIDE 10 MG: 5 INJECTION, SOLUTION INTRAMUSCULAR; INTRAVENOUS at 15:43

## 2021-08-08 RX ADMIN — ACETAMINOPHEN 650 MG: 325 TABLET, FILM COATED ORAL at 13:03

## 2021-08-08 RX ADMIN — IPRATROPIUM BROMIDE AND ALBUTEROL SULFATE 3 ML: 2.5; .5 SOLUTION RESPIRATORY (INHALATION) at 09:00

## 2021-08-08 RX ADMIN — METOCLOPRAMIDE 10 MG: 5 INJECTION, SOLUTION INTRAMUSCULAR; INTRAVENOUS at 08:08

## 2021-08-08 RX ADMIN — ATORVASTATIN CALCIUM 40 MG: 40 TABLET, FILM COATED ORAL at 21:43

## 2021-08-08 NOTE — PLAN OF CARE
Assessment: Chong Manuel presents with functional mobility impairments which indicate the need for skilled intervention. Tolerating session today without incident. Patient with isolated pacer. On 7 L hf, sats WNL but bp dropped to 78/61 on 2nd stance so did not try to amb today.  Only able to mip x 3 before fatigued. Stood 2x by end of session. Will continue to follow and progress as tolerated. Plans on dc home with 24 hr care and HH at [place]

## 2021-08-08 NOTE — THERAPY TREATMENT NOTE
Subjective: Pt agreeable to therapeutic plan of care. RN stated pt going to get blood soon but ok to see.     Objective:     Bed mobility - N/A or Not attempted.  Transfers - Min-A and with rolling walker  Ambulation -  feet N/A or Not attempted.    Pain: 0 VAS  Education: Provided education on importance of mobility and skilled verbal / tactile cueing throughout intervention.     Assessment: Chong Manuel presents with functional mobility impairments which indicate the need for skilled intervention. Tolerating session today without incident. Patient with isolated pacer. On 7 L hf, sats WNL but bp dropped to 78/61 on 2nd stance so did not try to amb today.  Only able to mip x 3 before fatigued. Stood 2x by end of session. Will continue to follow and progress as tolerated. Plans on dc home with 24 hr care and HH at dc     Plan/Recommendations:   Pt would benefit from Home with family assist and and Home Health at discharge from facility and requires no DME at discharge.   Pt desires Home with family assist and and Home Health at discharge. Pt cooperative; agreeable to therapeutic recommendations and plan of care.     Basic Mobility 6-click:  Rollin = Total, A lot = 2, A little = 3; 4 = None  Supine>Sit:   1 = Total, A lot = 2, A little = 3; 4 = None   Sit>Stand with arms:  1 = Total, A lot = 2, A little = 3; 4 = None  Bed>Chair:   1 = Total, A lot = 2, A little = 3; 4 = None  Ambulate in room:  1 = Total, A lot = 2, A little = 3; 4 = None  3-5 Steps with railin = Total, A lot = 2, A little = 3; 4 = None  Score: 16      Post-Tx Position: Up in Chair, Alarms activated and Call light and personal items within reach  PPE: gloves, surgical mask, eyewear protection

## 2021-08-08 NOTE — PROGRESS NOTES
Postoperative day #3 AVR  vitals are stable  labs noted  sinus rhythm  incisions are clean  normal course, discontinue lines ,low-dose diuretic

## 2021-08-08 NOTE — PROGRESS NOTES
CC--aortic stenosis status post surgery doing well  Chronic kidney disease      Sub--no symptoms and patient denies any angina or dyspnea    Past Medical History:   Diagnosis Date   • Anemia    • GERD (gastroesophageal reflux disease)    • History of transfusion    • Hyperlipidemia    • Hypertension    • Lung nodules     left    • Shortness of breath 4/19/2021   • Stroke (CMS/HCC) 2011     Past Surgical History:   Procedure Laterality Date   • CARDIAC CATHETERIZATION N/A 4/27/2021    Procedure: Left Heart Cath;  Surgeon: Darian Fajardo MD;  Location: [facility] CATH INVASIVE LOCATION;  Service: Cardiovascular;  Laterality: N/A;   • CARDIAC CATHETERIZATION N/A 4/27/2021    Procedure: Right Heart Cath;  Surgeon: Darian Fajardo MD;  Location: [facility] CATH INVASIVE LOCATION;  Service: Cardiovascular;  Laterality: N/A;   • COLONOSCOPY     • COLONOSCOPY N/A 7/27/2021    Procedure: COLONOSCOPY with polypectomy;  Surgeon: CIRA Pope MD;  Location: [facility] ENDOSCOPY;  Service: Gastroenterology;  Laterality: N/A;  post op: diverticulosis, polyp   • ENDOSCOPY N/A 7/26/2021    Procedure: ESOPHAGOGASTRODUODENOSCOPY;  Surgeon: CIRA Pope MD;  Location: [facility] ENDOSCOPY;  Service: Gastroenterology;  Laterality: N/A;  HH, gastric polyps   • HERNIA REPAIR       Family History   Problem Relation Age of Onset   • Heart attack Mother    • Heart failure Mother      Social History     Tobacco Use   • Smoking status: Former Smoker   • Smokeless tobacco: Never Used   Vaping Use   • Vaping Use: Never used   Substance Use Topics   • Alcohol use: No   • Drug use: No     Review of Systems   General:  positive for fatigue and tiredness  Eyes: No redness  Cardiovascular: No chest pain, no palpitations  Respiratory:   positive for class 2 shortness of breath  Gastrointestinal: No nausea or vomiting, bleeding  Genitourinary: no hematuria or dysuria  Musculoskeletal: No arthralgia or myalgia  Skin: No rash  Neurologic: No numbness,  tingling, syncope  Hematologic/Lymphatic: No abnormal bleeding      Physical Exam  VITALS REVIEWED--blood pressure 134/65 pulse rate 82 patient is afebrile respiration 12 times a minute    General:      well developed, well nourished, in no acute distress.    Head:      normocephalic and atraumatic.    Eyes:      PERRL/EOM intact, conjunctiva and sclera clear with out nystagmus.    Neck:      no masses, thyromegaly,  trachea central with normal respiratory effort   Lungs:     Reduced breath sounds bilateral bases     Heart:       Sinus rhythm without any significant murmurs or gallops  Pulses:      pulses normal in all 4 extremities.    Extremities:       no cyanosis or clubbing--trace left pedal edema and trace right pedal edema.    Neurologic:      no focal deficits.   alert oriented x3  Skin:      intact without lesions or rashes.    Psych:      alert and cooperative; normal mood and affect; normal attention span and concentration.          CBC    Results from last 7 days   Lab Units 08/08/21 0417 08/07/21  0405 08/06/21  0608 08/05/21  1855 08/05/21  1635 08/05/21  1532 08/05/21  1234 08/05/21  1131 08/05/21  1049 08/05/21  0723 08/03/21  0936   WBC 10*3/mm3 11.20* 13.40* 11.60*  --   --  8.10  --   --  8.00  --  5.40   HEMOGLOBIN g/dL 7.6* 6.9* 7.2*  --   --  7.4*  --   --  7.2*  --  9.5*   HEMOGLOBIN, POC g/dL  --   --   --  7.1* 8.1*  --  8.0*   < >  --    < >  --    PLATELETS 10*3/mm3 158 164 167  --   --  160  --   --  155  --  291    < > = values in this interval not displayed.     BMP   Results from last 7 days   Lab Units 08/08/21 0417 08/07/21  0405 08/06/21  0608 08/05/21  1532 08/05/21  1049 08/03/21  0936   SODIUM mmol/L 131* 133* 139 139 138 137   POTASSIUM mmol/L 4.4 4.6 4.5 4.6 4.7 5.3*   CHLORIDE mmol/L 99 102 106 106 105 103   CO2 mmol/L 23.0 23.0 22.0 23.0 25.0 26.0   BUN mg/dL 25* 20 17 15 12 11   CREATININE mg/dL 1.10 1.35* 1.28* 1.46* 1.48* 1.25   GLUCOSE mg/dL 117* 124* 127* 151* 98 102*    MAGNESIUM mg/dL  --   --  3.1* 2.7* 3.1*  --    PHOSPHORUS mg/dL  --   --  5.0* 3.4 4.6*  --      Radiology(recent) XR Chest 1 View    Result Date: 8/8/2021  Postoperative changes with increased septal thickening, question pulmonary edema. Persistent bibasilar airspace opacities and bilateral pleural effusions. No significant pneumothorax.  Electronically Signed By-Nelia Torres MD On:8/8/2021 9:11 AM This report was finalized on [id number] by  Nleia Torres MD.    XR Chest 1 View    Result Date: 8/7/2021  1 small bibasilar infiltrates and pleural effusions unchanged.  Electronically Signed By-Ray Patton MD On:8/7/2021 9:52 AM This report was finalized on [id number] by  Ray Patton MD.    XR Chest 1 View    Result Date: 8/6/2021  1. Removal of the [place] catheter and midline drain. No pneumothorax. 2. Central vascular congestion and interstitial edema pattern. 3. Bibasilar airspace opacities and likely small bilateral pleural effusions.  Electronically Signed By-Chivo Hansen MD On:8/6/2021 7:07 PM This report was finalized on [id number] by  Chivo Hansen MD.        Assessment plan    Severe aortic stenosis status post bioprosthetic AVR postop day 2 doing well  Anemia  Hypertension  Chronic kidney disease mild  Prior history of stroke and emphysema  Clinically stable   Awaiting transfusion for hemodynamic improvement and clinically stable        Electronically signed by Gera Jensen MD, 08/08/21, 12:14 PM EDT.

## 2021-08-08 NOTE — PLAN OF CARE
Goal Outcome Evaluation:        Patient received one unit of blood for symptomatic anemia. Repeat Hbg is 9.0. Patient remains on 6 liters of high flow humidified nasal cannula with the following ABG after receiving a unit of blood.                PaResults for YADIEL TRAVIS (MRN [id number]) as of 8/8/2021 18:05   Ref. Range 8/8/2021 10:55 8/8/2021 11:24 8/8/2021 15:15 8/8/2021 15:15 8/8/2021 15:37   pH, Arterial Latest Ref Range: 7.350 - 7.450 pH units   7.417     pCO2, Arterial Latest Ref Range: 35.0 - 48.0 mm Hg   32.1 (L)     pO2, Arterial Latest Ref Range: 83.0 - 108.0 mm Hg   69.5 (L)     HCO3, Arterial Latest Ref Range: 21.0 - 28.0 mmol/L   20.7 (L)     Base Excess Latest Ref Range: 0.0 - 3.0 mmol/L   -3.2 (L)     O2 Saturation, Arterial Latest Ref Range: 94.0 - 98.0 %   94.2     CO2 Content Latest Ref Range: 22 - 29 mmol/L   21.7 (L)         Patient pulls 1000 consistently on his IS. He is good on his feet and can ambulate with standby assist.  AV wires and Right IJ were discontinued today. Will continue to monitor.

## 2021-08-09 LAB
ANION GAP SERPL CALCULATED.3IONS-SCNC: 10 MMOL/L (ref 5–15)
BH BB BLOOD EXPIRATION DATE: NORMAL
BH BB BLOOD TYPE BARCODE: 9500
BH BB DISPENSE STATUS: NORMAL
BH BB PRODUCT CODE: NORMAL
BH BB UNIT NUMBER: NORMAL
BUN SERPL-MCNC: 14 MG/DL (ref 8–23)
BUN/CREAT SERPL: 17.1 (ref 7–25)
CALCIUM SPEC-SCNC: 8.4 MG/DL (ref 8.6–10.5)
CHLORIDE SERPL-SCNC: 99 MMOL/L (ref 98–107)
CO2 SERPL-SCNC: 26 MMOL/L (ref 22–29)
CREAT SERPL-MCNC: 0.82 MG/DL (ref 0.76–1.27)
CROSSMATCH INTERPRETATION: NORMAL
DEPRECATED RDW RBC AUTO: 53.8 FL (ref 37–54)
ERYTHROCYTE [DISTWIDTH] IN BLOOD BY AUTOMATED COUNT: 17.8 % (ref 12.3–15.4)
GFR SERPL CREATININE-BSD FRML MDRD: 91 ML/MIN/1.73
GLUCOSE BLDC GLUCOMTR-MCNC: 170 MG/DL (ref 70–105)
GLUCOSE BLDC GLUCOMTR-MCNC: 94 MG/DL (ref 70–105)
GLUCOSE BLDC GLUCOMTR-MCNC: 95 MG/DL (ref 70–105)
GLUCOSE SERPL-MCNC: 116 MG/DL (ref 65–99)
HCT VFR BLD AUTO: 29.1 % (ref 37.5–51)
HGB BLD-MCNC: 9.7 G/DL (ref 13–17.7)
MCH RBC QN AUTO: 27.6 PG (ref 26.6–33)
MCHC RBC AUTO-ENTMCNC: 33.2 G/DL (ref 31.5–35.7)
MCV RBC AUTO: 83.1 FL (ref 79–97)
PLATELET # BLD AUTO: 223 10*3/MM3 (ref 140–450)
PMV BLD AUTO: 8.1 FL (ref 6–12)
POTASSIUM SERPL-SCNC: 4.1 MMOL/L (ref 3.5–5.2)
RBC # BLD AUTO: 3.51 10*6/MM3 (ref 4.14–5.8)
SODIUM SERPL-SCNC: 135 MMOL/L (ref 136–145)
UNIT  ABO: NORMAL
UNIT  RH: NORMAL
WBC # BLD AUTO: 11 10*3/MM3 (ref 3.4–10.8)

## 2021-08-09 PROCEDURE — 25010000002 ENOXAPARIN PER 10 MG: Performed by: NURSE PRACTITIONER

## 2021-08-09 PROCEDURE — 99232 SBSQ HOSP IP/OBS MODERATE 35: CPT | Performed by: INTERNAL MEDICINE

## 2021-08-09 PROCEDURE — 94799 UNLISTED PULMONARY SVC/PX: CPT

## 2021-08-09 PROCEDURE — 82962 GLUCOSE BLOOD TEST: CPT

## 2021-08-09 PROCEDURE — 80048 BASIC METABOLIC PNL TOTAL CA: CPT | Performed by: NURSE PRACTITIONER

## 2021-08-09 PROCEDURE — 25010000002 METOCLOPRAMIDE PER 10 MG: Performed by: THORACIC SURGERY (CARDIOTHORACIC VASCULAR SURGERY)

## 2021-08-09 PROCEDURE — 85027 COMPLETE CBC AUTOMATED: CPT | Performed by: NURSE PRACTITIONER

## 2021-08-09 PROCEDURE — 97110 THERAPEUTIC EXERCISES: CPT

## 2021-08-09 PROCEDURE — 99024 POSTOP FOLLOW-UP VISIT: CPT | Performed by: THORACIC SURGERY (CARDIOTHORACIC VASCULAR SURGERY)

## 2021-08-09 PROCEDURE — 97535 SELF CARE MNGMENT TRAINING: CPT

## 2021-08-09 RX ADMIN — MUPIROCIN: 20 OINTMENT TOPICAL at 20:34

## 2021-08-09 RX ADMIN — ENOXAPARIN SODIUM 40 MG: 40 INJECTION SUBCUTANEOUS at 15:29

## 2021-08-09 RX ADMIN — FERROUS SULFATE TAB EC 324 MG (65 MG FE EQUIVALENT) 324 MG: 324 (65 FE) TABLET DELAYED RESPONSE at 08:54

## 2021-08-09 RX ADMIN — ASPIRIN 81 MG: 81 TABLET, COATED ORAL at 08:54

## 2021-08-09 RX ADMIN — METOPROLOL TARTRATE 25 MG: 25 TABLET, FILM COATED ORAL at 20:43

## 2021-08-09 RX ADMIN — MUPIROCIN: 20 OINTMENT TOPICAL at 08:54

## 2021-08-09 RX ADMIN — GABAPENTIN 100 MG: 100 CAPSULE ORAL at 15:29

## 2021-08-09 RX ADMIN — ATORVASTATIN CALCIUM 40 MG: 40 TABLET, FILM COATED ORAL at 20:33

## 2021-08-09 RX ADMIN — METOCLOPRAMIDE 10 MG: 5 INJECTION, SOLUTION INTRAMUSCULAR; INTRAVENOUS at 03:54

## 2021-08-09 RX ADMIN — METOCLOPRAMIDE 10 MG: 5 INJECTION, SOLUTION INTRAMUSCULAR; INTRAVENOUS at 08:54

## 2021-08-09 RX ADMIN — GABAPENTIN 100 MG: 100 CAPSULE ORAL at 08:54

## 2021-08-09 RX ADMIN — METOCLOPRAMIDE 10 MG: 5 INJECTION, SOLUTION INTRAMUSCULAR; INTRAVENOUS at 20:33

## 2021-08-09 RX ADMIN — METOPROLOL TARTRATE 12.5 MG: 25 TABLET, FILM COATED ORAL at 08:54

## 2021-08-09 RX ADMIN — CHLORHEXIDINE GLUCONATE 15 ML: 1.2 SOLUTION ORAL at 08:54

## 2021-08-09 RX ADMIN — GABAPENTIN 100 MG: 100 CAPSULE ORAL at 20:33

## 2021-08-09 NOTE — THERAPY TREATMENT NOTE
Subjective: Pt agreeable to therapeutic plan of care.  Cognition: oriented to Person, Place, Time and Situation    Objective:     Bed Mobility: N/A or Not attempted.  Functional Transfers: CGA  Functional Ambulation: CGA    Lower Body Dressing: CGA  ADL Position: supported sitting  ADL Comments: chair, with cuing for sternal precautions     Upper Body Dressing: Min-A  ADL Position: supported sitting  ADL Comments: Heart Hugger    OT provided and reviewed Cardiac Rehab HEP. Pt completes 1 set x 10 reps of each exercise with fair understanding. Encouraged to complete 3x/daily to facilitate increased activity tolerance. Pt will require additional education to ensure carryover.     Pain: 1 VAS  Education: Provided education on importance of mobility and skilled verbal / tactile cueing throughout intervention.     Assessment: Chong Manuel presents with ADL impairments below baseline abilities which indicate the need for continued skilled intervention while inpatient. Pt continues to dependt on 2L HF o2 with SpO2 dropping as low as 84% with minimal exertion tasks. Pt requires max verbal cuing for sternal precautions and proper body mechanics with transfers. He demos decreased activity tolerance ,and states only intermittent assist at home (with grandson stopping in in AM and son in PM after work).  Pt with impaired safety awareness, and will benefit from IP rehab at discharge to ensure safety and increased activity tolerance. Tolerating session today without incident. Will continue to follow and progress as tolerated.     Plan/Recommendations:   Pt would benefit from Inpatient Rehabilitation placement at discharge from facility.   Pt desires Home with Home Health vs IP rehab at discharge. Pt cooperative; agreeable to therapeutic recommendations and plan of care.     Post-Tx Position: Up in Chair, Alarms activated and Call light and personal items within reach  PPE: gloves, surgical mask, eyewear protection

## 2021-08-09 NOTE — PLAN OF CARE
Goal Outcome Evaluation:  Plan of Care Reviewed With: patient           Outcome Summary: Patient on 2L NC. Barrier to discharge - patient stated he lives alone and has no one to stay with him during recovery.

## 2021-08-09 NOTE — CASE MANAGEMENT/SOCIAL WORK
Continued Stay Note  [facility]     Patient Name: Chong Manuel  MRN: [id number]  Today's Date: 8/9/2021    Admit Date: 8/5/2021    Discharge Plan     Row Name 08/09/21 1224       Plan    Plan  D/C Plan : New referral to [facility]/H and [place] for a rolling walker . Pt has home o2 .    Provided Post Acute Provider List?  Yes    Post Acute Provider List  Home Health    Delivered To  Patient    Method of Delivery  In person    Patient/Family in Agreement with Plan  yes    Plan Comments  POD 4 of a AVR/CABG        Discharge Codes    No documentation.         Met with patient in room wearing PPE: mask, face shield/goggles,     Maintained distance greater than six feet and spent less than 15 minutes in the room.        Malena Wolfe RN

## 2021-08-09 NOTE — DISCHARGE PLACEMENT REQUEST
"Yadiel Travis (76 y.o. Male)     Date of Birth Social Security Number Address Home Phone MRN    1945  [address] [place] [address] [phone] [id number]    Sabianist Marital Status          Sabianist        Admission Date Admission Type Admitting Provider Attending Provider Department, Room/Bed    8/5/21 Elective Nael Padilla MD Pagni, Sebastian, MD [facility] CARDIOVASCULAR CARE UNIT, 2215/1    Discharge Date Discharge Disposition Discharge Destination                       Attending Provider: Nael Padilla MD    Allergies: Sulfa Antibiotics    Isolation: None   Infection: None   Code Status: CPR    Ht: 167.6 cm (66\")   Wt: 75.4 kg (166 lb 3.6 oz)    Admission Cmt: None   Principal Problem: Aortic stenosis [I35.0] More...                 Active Insurance as of 8/5/2021     Primary Coverage     Payor Plan Insurance Group Employer/Plan Group    ANTHEM MEDICARE REPLACEMENT ANTHEM MEDICARE ADVANTAGE [id number]     Payor Plan Address Payor Plan Phone Number Payor Plan Fax Number Effective Dates    [address] [phone]  1/1/2019 - None Entered    [facility] [address]       Subscriber Name Subscriber Birth Date Member ID       YADIEL TRAVIS 1945 [id number]                 Emergency Contacts      (Rel.) Home Phone Work Phone Mobile Phone    MELISSA TRAVIS \"MCKAY\" (Son) [phone] -- [phone]    Pastora Rodriguez (Daughter) -- -- [phone]              "

## 2021-08-09 NOTE — PLAN OF CARE
Goal Outcome Evaluation:  Plan of Care Reviewed With: patient        Progress: improving  Outcome Summary: On 5L NC. Had two bowel movements and showered without nursing staff assistance. Hopeful to go home. Had questions about nursing to come follow up.

## 2021-08-09 NOTE — PLAN OF CARE
Chong Manuel presents with ADL impairments below baseline abilities which indicate the need for continued skilled intervention while inpatient. Pt continues to dependt on 2L HF o2 with SpO2 dropping as low as 84% with minimal exertion tasks. Pt requires max verbal cuing for sternal precautions and proper body mechanics with transfers. He demos decreased activity tolerance ,and states only intermittent assist at home (with grandson stopping in in AM and son in PM after work).  Pt with impaired safety awareness, and will benefit from IP rehab at discharge to ensure safety and increased activity tolerance. Tolerating session today without incident. Will continue to follow and progress as tolerated.

## 2021-08-09 NOTE — PROGRESS NOTES
S/P POD# 4 elective minimally invasive tissue AVR--Pagni  EF 60% (cath)    Subjective:  No c/o's, wants to go home    Received PRBCs this weekend  Down to 2L NC (home oxygen)  Wt down 2 kgs from preop      Intake/Output Summary (Last 24 hours) at 8/9/2021 1615  Last data filed at 8/9/2021 0820  Gross per 24 hour   Intake 720 ml   Output 555 ml   Net 165 ml     Temp:  [98.4 °F (36.9 °C)-99.1 °F (37.3 °C)] 98.4 °F (36.9 °C)  Heart Rate:  [78-98] 88  Resp:  [15-23] 23  BP: ()/(48-78) 148/71      Results from last 7 days   Lab Units 08/09/21  0717 08/08/21  1537 08/08/21  1515 08/08/21  0417 08/07/21  0405 08/06/21  0608 08/05/21  1131 08/05/21  1049 08/03/21  0936 08/03/21  0915   WBC 10*3/mm3 11.00*  --   --  11.20*   < > 11.60*   < > 8.00   < >  --    HEMOGLOBIN g/dL 9.7* 9.0*  --  7.6*   < > 7.2*   < > 7.2*   < >  --    HEMOGLOBIN, POC   --   --    < >  --   --   --    < >  --    < >  --    HEMATOCRIT % 29.1* 26.5*  --  23.1*   < > 22.0*   < > 21.2*   < >  --    HEMATOCRIT POC   --   --    < >  --   --   --    < >  --    < >  --    PLATELETS 10*3/mm3 223  --   --  158   < > 167   < > 155   < >  --    INR   --   --   --   --   --  1.04  --  1.19*  --  0.99    < > = values in this interval not displayed.     Results from last 7 days   Lab Units 08/09/21  0717 08/07/21  0405 08/06/21  0608   CREATININE mg/dL 0.82   < > 1.28*   POTASSIUM mmol/L 4.1   < > 4.5   SODIUM mmol/L 135*   < > 139   MAGNESIUM mg/dL  --   --  3.1*   PHOSPHORUS mg/dL  --   --  5.0*    < > = values in this interval not displayed.       Physical Exam:  Neuro intact, nad, up in chair, no family present  Tele:  SR 80s  Diminished bases, 2L 94%  Mini-sternotomy healing well  Benign abd, + BM  No edema    Assessment/Plan:  Principal Problem:    Aortic stenosis    -Severe AS, EF 60-65% s/p mini AVR (Randy)  -acute on chronic anemia of unknown source, preop Hb 9.5--PRBC 8/8  -HTN--stable  -CKD (preop creatinine 1.2-1.5)  -Hx stroke--CT head in May,  mild atrophy  -Emphysema with home oxygen use--per CT scan, can not r/o ILD, DLCO 26  -Lung nodules--c/w granulomatous disease  -Post op nausea----scopalamine  -post op leukocytosis----reactive    POD#4.  Doing well.  Back to baseline on oxygen requirements.  Pt lives alone but has family to assist him but no one can stay overnight with him.  DC planning asked to re-evaluate him for dc needs.  On asa/bb/statin.    Routine care--  D/w pt, nsg, Dr. Padilla  Unclear situation at discharge    Clare Harris, APRN  8/9/2021  16:15 EDT

## 2021-08-09 NOTE — DISCHARGE PLACEMENT REQUEST
"Yadiel Travis (76 y.o. Male)     Date of Birth Social Security Number Address Home Phone MRN    1945  [address] [place] [address] [phone] [id number]    Voodoo Marital Status          Mormonism        Admission Date Admission Type Admitting Provider Attending Provider Department, Room/Bed    8/5/21 Elective Nael Padilla MD Pagni, Sebastian, MD [facility] CARDIOVASCULAR CARE UNIT, 2215/1    Discharge Date Discharge Disposition Discharge Destination                       Attending Provider: Nael Padilla MD    Allergies: Sulfa Antibiotics    Isolation: None   Infection: None   Code Status: CPR    Ht: 167.6 cm (66\")   Wt: 75.4 kg (166 lb 3.6 oz)    Admission Cmt: None   Principal Problem: Aortic stenosis [I35.0] More...                 Active Insurance as of 8/5/2021     Primary Coverage     Payor Plan Insurance Group Employer/Plan Group    ANTHEM MEDICARE REPLACEMENT ANTHEM MEDICARE ADVANTAGE [id number]     Payor Plan Address Payor Plan Phone Number Payor Plan Fax Number Effective Dates    [address] [phone]  1/1/2019 - None Entered    [facility] [id number]       Subscriber Name Subscriber Birth Date Member ID       YADIEL TRAVIS 1945 [id number]                 Emergency Contacts      (Rel.) Home Phone Work Phone Mobile Phone    MELISSA TRAVIS \"MCKAY\" (Son) [phone] -- [phone]    Pastora Rodriguez (Daughter) -- -- [phone]              "

## 2021-08-10 ENCOUNTER — HOME HEALTH ADMISSION (OUTPATIENT)
Dept: HOME HEALTH SERVICES | Facility: HOME HEALTHCARE | Age: 76
End: 2021-08-10

## 2021-08-10 VITALS
TEMPERATURE: 97.6 F | SYSTOLIC BLOOD PRESSURE: 129 MMHG | OXYGEN SATURATION: 95 % | DIASTOLIC BLOOD PRESSURE: 70 MMHG | RESPIRATION RATE: 19 BRPM | BODY MASS INDEX: 26.81 KG/M2 | HEART RATE: 84 BPM | WEIGHT: 166.8 LBS | HEIGHT: 66 IN

## 2021-08-10 PROBLEM — Z95.2 S/P AVR: Status: ACTIVE | Noted: 2021-08-10

## 2021-08-10 LAB
ANION GAP SERPL CALCULATED.3IONS-SCNC: 7 MMOL/L (ref 5–15)
BUN SERPL-MCNC: 12 MG/DL (ref 8–23)
BUN/CREAT SERPL: 16.4 (ref 7–25)
CALCIUM SPEC-SCNC: 8.2 MG/DL (ref 8.6–10.5)
CHLORIDE SERPL-SCNC: 97 MMOL/L (ref 98–107)
CO2 SERPL-SCNC: 25 MMOL/L (ref 22–29)
CREAT SERPL-MCNC: 0.73 MG/DL (ref 0.76–1.27)
GFR SERPL CREATININE-BSD FRML MDRD: 104 ML/MIN/1.73
GLUCOSE BLDC GLUCOMTR-MCNC: 115 MG/DL (ref 70–105)
GLUCOSE BLDC GLUCOMTR-MCNC: 99 MG/DL (ref 70–105)
GLUCOSE SERPL-MCNC: 110 MG/DL (ref 65–99)
POTASSIUM SERPL-SCNC: 4 MMOL/L (ref 3.5–5.2)
SODIUM SERPL-SCNC: 129 MMOL/L (ref 136–145)

## 2021-08-10 PROCEDURE — 82962 GLUCOSE BLOOD TEST: CPT

## 2021-08-10 PROCEDURE — 25010000002 FUROSEMIDE PER 20 MG: Performed by: NURSE PRACTITIONER

## 2021-08-10 PROCEDURE — 80048 BASIC METABOLIC PNL TOTAL CA: CPT | Performed by: NURSE PRACTITIONER

## 2021-08-10 PROCEDURE — 99232 SBSQ HOSP IP/OBS MODERATE 35: CPT | Performed by: INTERNAL MEDICINE

## 2021-08-10 PROCEDURE — 97116 GAIT TRAINING THERAPY: CPT

## 2021-08-10 PROCEDURE — 25010000002 METOCLOPRAMIDE PER 10 MG: Performed by: THORACIC SURGERY (CARDIOTHORACIC VASCULAR SURGERY)

## 2021-08-10 PROCEDURE — 99024 POSTOP FOLLOW-UP VISIT: CPT | Performed by: THORACIC SURGERY (CARDIOTHORACIC VASCULAR SURGERY)

## 2021-08-10 PROCEDURE — 94799 UNLISTED PULMONARY SVC/PX: CPT

## 2021-08-10 PROCEDURE — 97530 THERAPEUTIC ACTIVITIES: CPT

## 2021-08-10 RX ORDER — POTASSIUM CHLORIDE 20 MEQ/1
20 TABLET, EXTENDED RELEASE ORAL DAILY
Status: DISCONTINUED | OUTPATIENT
Start: 2021-08-10 | End: 2021-08-10 | Stop reason: HOSPADM

## 2021-08-10 RX ORDER — ACETAMINOPHEN 325 MG/1
650 TABLET ORAL EVERY 4 HOURS PRN
Start: 2021-08-10

## 2021-08-10 RX ORDER — FUROSEMIDE 10 MG/ML
20 INJECTION INTRAMUSCULAR; INTRAVENOUS ONCE
Qty: 2 ML | Refills: 0
Start: 2021-08-10 | End: 2021-08-13

## 2021-08-10 RX ORDER — FERROUS SULFATE TAB EC 324 MG (65 MG FE EQUIVALENT) 324 (65 FE) MG
324 TABLET DELAYED RESPONSE ORAL
Qty: 30 TABLET | Refills: 2 | Status: SHIPPED | OUTPATIENT
Start: 2021-08-11 | End: 2022-02-21

## 2021-08-10 RX ORDER — FUROSEMIDE 40 MG/1
40 TABLET ORAL DAILY
Status: DISCONTINUED | OUTPATIENT
Start: 2021-08-10 | End: 2021-08-10 | Stop reason: HOSPADM

## 2021-08-10 RX ORDER — ASPIRIN 81 MG/1
81 TABLET ORAL DAILY
Qty: 30 TABLET | Refills: 3 | Status: SHIPPED | OUTPATIENT
Start: 2021-08-11

## 2021-08-10 RX ORDER — FUROSEMIDE 10 MG/ML
20 INJECTION INTRAMUSCULAR; INTRAVENOUS ONCE
Status: COMPLETED | OUTPATIENT
Start: 2021-08-10 | End: 2021-08-10

## 2021-08-10 RX ADMIN — METOPROLOL TARTRATE 25 MG: 25 TABLET, FILM COATED ORAL at 08:32

## 2021-08-10 RX ADMIN — ASPIRIN 81 MG: 81 TABLET, COATED ORAL at 08:32

## 2021-08-10 RX ADMIN — GABAPENTIN 100 MG: 100 CAPSULE ORAL at 08:32

## 2021-08-10 RX ADMIN — POTASSIUM CHLORIDE 20 MEQ: 1500 TABLET, EXTENDED RELEASE ORAL at 10:51

## 2021-08-10 RX ADMIN — CHLORHEXIDINE GLUCONATE 15 ML: 1.2 SOLUTION ORAL at 08:32

## 2021-08-10 RX ADMIN — MUPIROCIN: 20 OINTMENT TOPICAL at 08:39

## 2021-08-10 RX ADMIN — METOCLOPRAMIDE 10 MG: 5 INJECTION, SOLUTION INTRAMUSCULAR; INTRAVENOUS at 08:32

## 2021-08-10 RX ADMIN — METOCLOPRAMIDE 10 MG: 5 INJECTION, SOLUTION INTRAMUSCULAR; INTRAVENOUS at 03:48

## 2021-08-10 RX ADMIN — FERROUS SULFATE TAB EC 324 MG (65 MG FE EQUIVALENT) 324 MG: 324 (65 FE) TABLET DELAYED RESPONSE at 08:32

## 2021-08-10 RX ADMIN — FUROSEMIDE 20 MG: 10 INJECTION, SOLUTION INTRAMUSCULAR; INTRAVENOUS at 09:07

## 2021-08-10 NOTE — PLAN OF CARE
Goal Outcome Evaluation:  Plan of Care Reviewed With: patient        Progress: improving  Outcome Summary: Pt 2LNC, plans for discharge this morning - ambulated 2 times total for myself tonight, vitals stable, will continue to monitor

## 2021-08-10 NOTE — PROGRESS NOTES
LOS: 4 days   Admiting Physician- Nael Padilla MD    Reason For Followup:    Severe aortic stenosis  S/p AVR    Subjective     Patient is sitting up in chair.  No complaints    Objective     Chest tubes are out.  Hemodynamics are stable    Review of Systems:   Review of Systems   Constitutional: Negative for chills and fever.   HENT: Negative for ear discharge and nosebleeds.    Eyes: Negative for discharge and redness.   Cardiovascular: Negative for chest pain, orthopnea, palpitations, paroxysmal nocturnal dyspnea and syncope.   Respiratory: Positive for shortness of breath. Negative for cough and wheezing.    Endocrine: Negative for heat intolerance.   Skin: Negative for rash.   Musculoskeletal: Negative for arthritis and myalgias.   Gastrointestinal: Negative for abdominal pain, melena, nausea and vomiting.   Genitourinary: Negative for dysuria and hematuria.   Neurological: Negative for dizziness, light-headedness, numbness and tremors.   Psychiatric/Behavioral: Negative for depression. The patient is not nervous/anxious.          Vital Signs  Vitals:    08/09/21 1551 08/09/21 1558 08/09/21 1601 08/09/21 1748   BP:   155/71    BP Location:       Patient Position:       Pulse: 87 88 86 91   Resp:       Temp:       TempSrc:       SpO2: 92%      Weight:       Height:         Wt Readings from Last 1 Encounters:   08/09/21 75.4 kg (166 lb 3.6 oz)       Intake/Output Summary (Last 24 hours) at 8/9/2021 2029  Last data filed at 8/9/2021 1620  Gross per 24 hour   Intake 960 ml   Output 630 ml   Net 330 ml     Physical Exam:  Constitutional:       Appearance: Well-developed.   Eyes:      General: No scleral icterus.        Right eye: No discharge.   HENT:      Head: Normocephalic and atraumatic.   Neck:      Thyroid: No thyromegaly.      Lymphadenopathy: No cervical adenopathy.   Pulmonary:      Effort: Pulmonary effort is normal. No respiratory distress.      Breath sounds: Normal breath sounds. No wheezing. No  rales.   Cardiovascular:      Normal rate. Regular rhythm.      No gallop.   Edema:     Peripheral edema absent.   Abdominal:      Tenderness: There is no abdominal tenderness.   Skin:     Findings: No erythema or rash.   Neurological:      Mental Status: Alert and oriented to person, place, and time.         Results Review:   Lab Results (last 24 hours)     Procedure Component Value Units Date/Time    POC Glucose Once [[id number]]  (Normal) Collected: 08/09/21 1618    Specimen: Blood Updated: 08/09/21 1619     Glucose 94 mg/dL      Comment: Serial Number: [id number]:  [id number]       POC Glucose Once [[id number]]  (Normal) Collected: 08/09/21 1158    Specimen: Blood Updated: 08/09/21 1159     Glucose 95 mg/dL      Comment: Serial Number: [id number]:  [id number]       CBC (No Diff) [[id number]]  (Abnormal) Collected: 08/09/21 0717    Specimen: Blood Updated: 08/09/21 0958     WBC 11.00 10*3/mm3      RBC 3.51 10*6/mm3      Hemoglobin 9.7 g/dL      Hematocrit 29.1 %      MCV 83.1 fL      MCH 27.6 pg      MCHC 33.2 g/dL      RDW 17.8 %      RDW-SD 53.8 fl      MPV 8.1 fL      Platelets 223 10*3/mm3     Basic Metabolic Panel [[id number]]  (Abnormal) Collected: 08/09/21 0717    Specimen: Blood Updated: 08/09/21 0748     Glucose 116 mg/dL      BUN 14 mg/dL      Creatinine 0.82 mg/dL      Sodium 135 mmol/L      Potassium 4.1 mmol/L      Comment: Slight hemolysis detected by analyzer. Results may be affected.        Chloride 99 mmol/L      CO2 26.0 mmol/L      Calcium 8.4 mg/dL      eGFR Non African Amer 91 mL/min/1.73      BUN/Creatinine Ratio 17.1     Anion Gap 10.0 mmol/L     Narrative:      GFR Normal >60  Chronic Kidney Disease <60  Kidney Failure <15          Imaging Results (Last 72 Hours)     Procedure Component Value Units Date/Time    XR Chest 1 View [[id number]] Collected: 08/08/21 0908     Updated: 08/08/21 0913    Narrative:      DATE OF EXAM:  8/8/2021 3:52 AM     PROCEDURE:  XR CHEST 1 VW-      INDICATIONS:  Post op open heart; I35.0-Nonrheumatic aortic (valve) stenosis     COMPARISON:  Chest radiograph 08/07/2021     TECHNIQUE:   Single radiographic view of the chest was obtained.     FINDINGS:  Cardiomegaly. Midline study. Prosthetic aortic valve. Internal jugular  central venous catheter tip terminates the superior vena cava. Pleural  effusion is present larger on the right than left. There is septal  thickening which may have progressed from previous study. There is  bibasilar airspace opacities similar to previous exam. There is no  significant pneumothorax.       Impression:      Postoperative changes with increased septal thickening, question  pulmonary edema. Persistent bibasilar airspace opacities and bilateral  pleural effusions.  No significant pneumothorax.     Electronically Signed By-Nelia Torres MD On:8/8/2021 9:11 AM  This report was finalized on [id number] by  Nelia Torres MD.    XR Chest 1 View [[id number]] Collected: 08/07/21 0949     Updated: 08/07/21 0954    Narrative:      DATE OF EXAM:  8/7/2021 4:32 AM     PROCEDURE:  XR CHEST 1 VW-     INDICATIONS:  Post-Op Heart Surgery; I35.0-Nonrheumatic aortic (valve) stenosis  history smoking. History of stroke. I blood pressure. Chest surgery  follow-up. Cough today     COMPARISON:  Single frontal view chest performed on August 6, 2021     TECHNIQUE:   Single radiographic AP view of the chest was obtained.     FINDINGS:  Single frontal view chest reveals the patient is status post sternotomy.  The heart is mildly enlarged. Superior mediastinum is normal. There are  bilateral diffuse increased lung markings unchanged. There are small  infiltrates and small pleural effusions in the lung bases bilaterally  unchanged. No evidence of pneumothorax. Proximal tip of the right  internal jugular central venous line catheter projects over the vena  cava. No evidence of pneumothorax.        Impression:      1 small bibasilar infiltrates and pleural  effusions unchanged.     Electronically Signed By-Ray Patton MD On:8/7/2021 9:52 AM  This report was finalized on [id number] by  Ray Patton MD.        ECG/EMG Results (most recent)     Procedure Component Value Units Date/Time    ECG 12 Lead [[id number]] Collected: 08/05/21 1159     Updated: 08/05/21 2040     QT Interval 448 ms     Narrative:      HEART RATE= 64  bpm  RR Interval= 944  ms  NY Interval= 161  ms  P Horizontal Axis= -34  deg  P Front Axis= 70  deg  QRSD Interval= 105  ms  QT Interval= 448  ms  QRS Axis= 82  deg  T Wave Axis= 52  deg  - NORMAL ECG -  Sinus rhythm  When compared with ECG of 03-Aug-2021 9:43:47,  Significant repolarization change  Electronically Signed By: Kathleen Villagomez ([facility]) 05-Aug-2021 20:35:10  Date and Time of Study: 2021-08-05 11:59:50    ECG 12 Lead [[id number]] Collected: 08/06/21 0457     Updated: 08/06/21 1741     QT Interval 368 ms     Narrative:      HEART RATE= 81  bpm  RR Interval= 736  ms  NY Interval= 129  ms  P Horizontal Axis= -28  deg  P Front Axis= 38  deg  QRSD Interval= 87  ms  QT Interval= 368  ms  QRS Axis= 48  deg  T Wave Axis= -35  deg  - BORDERLINE ECG -  Sinus rhythm  Borderline T abnormalities, diffuse leads  When compared with ECG of 05-Aug-2021 11:59:50,  Significant repolarization change  Electronically Signed By: Gera Jensen ([facility]) 06-Aug-2021 17:40:45  Date and Time of Study: 2021-08-06 04:57:22    ECG 12 Lead [[id number]] Collected: 08/07/21 0431     Updated: 08/07/21 1604     QT Interval 361 ms     Narrative:      HEART RATE= 82  bpm  RR Interval= 732  ms  NY Interval= 115  ms  P Horizontal Axis= -19  deg  P Front Axis= 49  deg  QRSD Interval= 96  ms  QT Interval= 361  ms  QRS Axis= 41  deg  T Wave Axis= 8  deg  - BORDERLINE ECG -  Sinus rhythm  Borderline ST elevation, lateral leads  When compared with ECG of 06-Aug-2021 4:57:22,  Significant repolarization change  Electronically Signed By: Brian Castelan ([facility])  07-Aug-2021 16:02:05  Date and Time of Study: 2021-08-07 04:31:55        CBC    Results from last 7 days   Lab Units 08/09/21  0717 08/08/21  1537 08/08/21  1515 08/08/21  0417 08/07/21  0405 08/06/21  0608 08/05/21  1855 08/05/21  1635 08/05/21  1532 08/05/21  1131 08/05/21  1049 08/05/21  0723 08/03/21  0936   WBC 10*3/mm3 11.00*  --   --  11.20* 13.40* 11.60*  --   --  8.10  --  8.00  --  5.40   HEMOGLOBIN g/dL 9.7* 9.0*  --  7.6* 6.9* 7.2*  --   --  7.4*   < > 7.2*  --  9.5*   HEMOGLOBIN, POC g/dL  --   --  10.1*  --   --   --  7.1*   < >  --    < >  --    < >  --    PLATELETS 10*3/mm3 223  --   --  158 164 167  --   --  160  --  155  --  291    < > = values in this interval not displayed.     BMP   Results from last 7 days   Lab Units 08/09/21  0717 08/08/21  0417 08/07/21  0405 08/06/21  0608 08/05/21  1532 08/05/21  1049 08/03/21  0936   SODIUM mmol/L 135* 131* 133* 139 139 138 137   POTASSIUM mmol/L 4.1 4.4 4.6 4.5 4.6 4.7 5.3*   CHLORIDE mmol/L 99 99 102 106 106 105 103   CO2 mmol/L 26.0 23.0 23.0 22.0 23.0 25.0 26.0   BUN mg/dL 14 25* 20 17 15 12 11   CREATININE mg/dL 0.82 1.10 1.35* 1.28* 1.46* 1.48* 1.25   GLUCOSE mg/dL 116* 117* 124* 127* 151* 98 102*   MAGNESIUM mg/dL  --   --   --  3.1* 2.7* 3.1*  --    PHOSPHORUS mg/dL  --   --   --  5.0* 3.4 4.6*  --      CMP   Results from last 7 days   Lab Units 08/09/21  0717 08/08/21  0417 08/07/21  0405 08/06/21  0608 08/05/21  1532 08/05/21  1049 08/03/21  0936   SODIUM mmol/L 135* 131* 133* 139 139 138 137   POTASSIUM mmol/L 4.1 4.4 4.6 4.5 4.6 4.7 5.3*   CHLORIDE mmol/L 99 99 102 106 106 105 103   CO2 mmol/L 26.0 23.0 23.0 22.0 23.0 25.0 26.0   BUN mg/dL 14 25* 20 17 15 12 11   CREATININE mg/dL 0.82 1.10 1.35* 1.28* 1.46* 1.48* 1.25   GLUCOSE mg/dL 116* 117* 124* 127* 151* 98 102*   ALBUMIN g/dL  --   --   --  4.40 4.70 4.30 4.10   BILIRUBIN mg/dL  --   --   --   --   --   --  0.3   ALK PHOS U/L  --   --   --   --   --   --  38*   AST (SGOT) U/L  --   --    --   --   --   --  17   ALT (SGPT) U/L  --   --   --   --   --   --  5     Cardiac Studies:  Echo- Results for orders placed during the hospital encounter of 04/27/21    Adult Transesophageal Echo (RAFAT) W/ Cont if Necessary Per Protocol    Interpretation Summary  · Left ventricular systolic function is normal.  · Left ventricular ejection fraction is 60 to 65%  · Left ventricular wall thickness is consistent with mild concentric hypertrophy.  · Left ventricular diastolic function was normal.  · Moderate aortic valve regurgitation is present.  · Severe aortic valve stenosis is present. Aortic valve area is 0.54 cm2.  · Peak velocity of the flow distal to the aortic valve is 483.5 cm/s. Aortic valve maximum pressure gradient is 93.5 mmHg. Aortic valve mean pressure gradient is 47.4 mmHg.  · Saline test results are negative.    Stress Myoview-  Cath-      Medication Review:   Scheduled Meds:aspirin, 81 mg, Oral, Daily  aspirin, 325 mg, Oral, Once  atorvastatin, 40 mg, Oral, Nightly  budesonide, 0.5 mg, Nebulization, BID - RT  chlorhexidine, 15 mL, Mouth/Throat, Q12H  enoxaparin, 40 mg, Subcutaneous, Daily  ferrous sulfate, 324 mg, Oral, Daily With Breakfast  gabapentin, 100 mg, Oral, TID  insulin lispro, 0-7 Units, Subcutaneous, TID AC  ipratropium-albuterol, 3 mL, Nebulization, 4x Daily - RT  metoclopramide, 10 mg, Intravenous, Q6H  metoprolol tartrate, 12.5 mg, Oral, Q12H  mupirocin, , Each Nare, BID  Scopolamine, 1 patch, Transdermal, Q72H  senna-docusate sodium, 2 tablet, Oral, BID      Continuous Infusions:   PRN Meds:.•  acetaminophen **OR** acetaminophen **OR** acetaminophen  •  bisacodyl  •  bisacodyl  •  dextrose  •  dextrose  •  glucagon (human recombinant)  •  HYDROcodone-acetaminophen  •  HYDROcodone-acetaminophen  •  insulin lispro **AND** insulin lispro  •  magnesium hydroxide  •  [DISCONTINUED] Morphine **AND** naloxone  •  ondansetron  •  polyethylene glycol  •  potassium chloride **OR** potassium  chloride      Assessment/Plan   Patient Active Problem List   Diagnosis   • Acute on chronic blood loss anemia   • Essential hypertension   • Hyperlipidemia   • Chronic GERD   • CVA (cerebral vascular accident) (CMS/HCC)   • Hypertrophy of prostate without urinary obstruction and other lower urinary tract symptoms (LUTS)   • Iron deficiency anemia   • Pure hypercholesterolemia   • Solitary pulmonary nodule   • Shortness of breath   • Chest discomfort   • Aortic stenosis, severe   • Aortic stenosis   • GI bleed     MDM:    1.  Aortic stenosis/s/p AVR:    Patient is doing well.  Hemodynamics are stable.  Blood pressure is slightly high.    2.  Hypertension:    I would recommend to increase Lopressor to 25 mg twice daily.  Monitor blood pressure    3.  Hyperlipidemia:    Patient is on Lipitor.    Darian Fajardo MD  08/09/21  20:29 EDT

## 2021-08-10 NOTE — CASE MANAGEMENT/SOCIAL WORK
Case Management Discharge Note      Final Note: F H/H    Provided Post Acute Provider List?: Yes  Post Acute Provider List: Home Health  Delivered To: Patient  Method of Delivery: In person    Selected Continued Care - Discharged on 8/10/2021 Admission date: 8/5/2021 - Discharge disposition: Home-Health Care [facility] [place]                         Final Discharge Disposition Code: 06 - home with home health care

## 2021-08-10 NOTE — PLAN OF CARE
Goal Outcome Evaluation:  Assessment: Chong Manuel presents with functional mobility impairments which indicate the need for skilled intervention. Tolerating session today without incident. Pt on 2L hi-carley.  Pt BP WNL and showed no signs of orthostatic hypotension.  Pt ambulates for multiple bouts, exhibiting progress towards mobility goals.  Pt required no verbal cues for following sternal precautions throughout treatment session.  Pt has strong family support and is safe to return home with family assist and HHPT upon d/c.  Will continue to follow and progress as tolerated.     Plan/Recommendations:   Pt would benefit from Home with Home Health and Home with family assist at discharge from facility and requires no DME at discharge.   Pt desires Home with Home Health and Home with family assist at discharge. Pt cooperative; agreeable to therapeutic recommendations and plan of care.

## 2021-08-10 NOTE — PROGRESS NOTES
LOS: 5 days   Admiting Physician- Nael Padilla MD    Reason For Followup:    Severe aortic stenosis  S/p AVR    Subjective     Patient is sitting up in chair.  No complaints.  No chest pain    Objective     Hemodynamics are stable.    Review of Systems:   Review of Systems   Constitutional: Negative for chills and fever.   HENT: Negative for ear discharge and nosebleeds.    Eyes: Negative for discharge and redness.   Cardiovascular: Negative for chest pain, orthopnea, palpitations, paroxysmal nocturnal dyspnea and syncope.   Respiratory: Positive for shortness of breath. Negative for cough and wheezing.    Endocrine: Negative for heat intolerance.   Skin: Negative for rash.   Musculoskeletal: Negative for arthritis and myalgias.   Gastrointestinal: Negative for abdominal pain, melena, nausea and vomiting.   Genitourinary: Negative for dysuria and hematuria.   Neurological: Negative for dizziness, light-headedness, numbness and tremors.   Psychiatric/Behavioral: Negative for depression. The patient is not nervous/anxious.          Vital Signs  Vitals:    08/10/21 0733 08/10/21 0749 08/10/21 0800 08/10/21 0900   BP:  137/82 137/82 143/81   BP Location:  Right arm     Patient Position:  Sitting     Pulse: 91 95 94 93   Resp:  19     Temp:  97.3 °F (36.3 °C)     TempSrc:  Oral     SpO2: 94% 93% 93% 92%   Weight:       Height:         Wt Readings from Last 1 Encounters:   08/10/21 75.7 kg (166 lb 12.8 oz)       Intake/Output Summary (Last 24 hours) at 8/10/2021 1113  Last data filed at 8/10/2021 0830  Gross per 24 hour   Intake 720 ml   Output 450 ml   Net 270 ml     Physical Exam:  Constitutional:       Appearance: Well-developed.   Eyes:      General: No scleral icterus.        Right eye: No discharge.   HENT:      Head: Normocephalic and atraumatic.   Neck:      Thyroid: No thyromegaly.      Lymphadenopathy: No cervical adenopathy.   Pulmonary:      Effort: Pulmonary effort is normal. No respiratory distress.       Breath sounds: Normal breath sounds. No wheezing. No rales.   Cardiovascular:      Normal rate. Regular rhythm.      No gallop.   Edema:     Peripheral edema absent.   Abdominal:      Tenderness: There is no abdominal tenderness.   Skin:     Findings: No erythema or rash.   Neurological:      Mental Status: Alert and oriented to person, place, and time.         Results Review:   Lab Results (last 24 hours)     Procedure Component Value Units Date/Time    POC Glucose Once [[id number]]  (Abnormal) Collected: 08/10/21 0753    Specimen: Blood Updated: 08/10/21 0754     Glucose 115 mg/dL      Comment: Serial Number: [id number]:  [id number]       Basic Metabolic Panel [[id number]]  (Abnormal) Collected: 08/10/21 0319    Specimen: Blood Updated: 08/10/21 0415     Glucose 110 mg/dL      BUN 12 mg/dL      Creatinine 0.73 mg/dL      Sodium 129 mmol/L      Potassium 4.0 mmol/L      Chloride 97 mmol/L      CO2 25.0 mmol/L      Calcium 8.2 mg/dL      eGFR Non African Amer 104 mL/min/1.73      BUN/Creatinine Ratio 16.4     Anion Gap 7.0 mmol/L     Narrative:      GFR Normal >60  Chronic Kidney Disease <60  Kidney Failure <15      POC Glucose Once [[id number]]  (Abnormal) Collected: 08/09/21 2041    Specimen: Blood Updated: 08/09/21 2041     Glucose 170 mg/dL      Comment: Serial Number: [id number]:  [id number]       POC Glucose Once [[id number]]  (Normal) Collected: 08/09/21 1618    Specimen: Blood Updated: 08/09/21 1619     Glucose 94 mg/dL      Comment: Serial Number: [id number]:  [id number]           Imaging Results (Last 72 Hours)     Procedure Component Value Units Date/Time    XR Chest 1 View [[id number]] Collected: 08/08/21 0908     Updated: 08/08/21 0913    Narrative:      DATE OF EXAM:  8/8/2021 3:52 AM     PROCEDURE:  XR CHEST 1 VW-     INDICATIONS:  Post op open heart; I35.0-Nonrheumatic aortic (valve) stenosis     COMPARISON:  Chest radiograph 08/07/2021     TECHNIQUE:   Single radiographic view of  the chest was obtained.     FINDINGS:  Cardiomegaly. Midline study. Prosthetic aortic valve. Internal jugular  central venous catheter tip terminates the superior vena cava. Pleural  effusion is present larger on the right than left. There is septal  thickening which may have progressed from previous study. There is  bibasilar airspace opacities similar to previous exam. There is no  significant pneumothorax.       Impression:      Postoperative changes with increased septal thickening, question  pulmonary edema. Persistent bibasilar airspace opacities and bilateral  pleural effusions.  No significant pneumothorax.     Electronically Signed By-Nelia Torres MD On:8/8/2021 9:11 AM  This report was finalized on [id number] by  Nelia Torres MD.        ECG/EMG Results (most recent)     Procedure Component Value Units Date/Time    ECG 12 Lead [[id number]] Collected: 08/05/21 1159     Updated: 08/05/21 2040     QT Interval 448 ms     Narrative:      HEART RATE= 64  bpm  RR Interval= 944  ms  LA Interval= 161  ms  P Horizontal Axis= -34  deg  P Front Axis= 70  deg  QRSD Interval= 105  ms  QT Interval= 448  ms  QRS Axis= 82  deg  T Wave Axis= 52  deg  - NORMAL ECG -  Sinus rhythm  When compared with ECG of 03-Aug-2021 9:43:47,  Significant repolarization change  Electronically Signed By: Kathleen Villagomez ([facility]) 05-Aug-2021 20:35:10  Date and Time of Study: 2021-08-05 11:59:50    ECG 12 Lead [[id number]] Collected: 08/06/21 0457     Updated: 08/06/21 1741     QT Interval 368 ms     Narrative:      HEART RATE= 81  bpm  RR Interval= 736  ms  LA Interval= 129  ms  P Horizontal Axis= -28  deg  P Front Axis= 38  deg  QRSD Interval= 87  ms  QT Interval= 368  ms  QRS Axis= 48  deg  T Wave Axis= -35  deg  - BORDERLINE ECG -  Sinus rhythm  Borderline T abnormalities, diffuse leads  When compared with ECG of 05-Aug-2021 11:59:50,  Significant repolarization change  Electronically Signed By: Gera Jensen ([facility]) 06-Aug-2021  17:40:45  Date and Time of Study: 2021-08-06 04:57:22    ECG 12 Lead [[id number]] Collected: 08/07/21 0431     Updated: 08/07/21 1604     QT Interval 361 ms     Narrative:      HEART RATE= 82  bpm  RR Interval= 732  ms  [place] Interval= 115  ms  P Horizontal Axis= -19  deg  P Front Axis= 49  deg  QRSD Interval= 96  ms  QT Interval= 361  ms  QRS Axis= 41  deg  T Wave Axis= 8  deg  - BORDERLINE ECG -  Sinus rhythm  Borderline ST elevation, lateral leads  When compared with ECG of 06-Aug-2021 4:57:22,  Significant repolarization change  Electronically Signed By: Brian Castelan ([facility]) 07-Aug-2021 16:02:05  Date and Time of Study: 2021-08-07 04:31:55        CBC    Results from last 7 days   Lab Units 08/09/21 0717 08/08/21  1537 08/08/21  1515 08/08/21  0417 08/07/21  0405 08/06/21  0608 08/05/21  1855 08/05/21  1635 08/05/21  1532 08/05/21  1131 08/05/21  1049   WBC 10*3/mm3 11.00*  --   --  11.20* 13.40* 11.60*  --   --  8.10  --  8.00   HEMOGLOBIN g/dL 9.7* 9.0*  --  7.6* 6.9* 7.2*  --   --  7.4*   < > 7.2*   HEMOGLOBIN, POC g/dL  --   --  10.1*  --   --   --  7.1*   < >  --    < >  --    PLATELETS 10*3/mm3 223  --   --  158 164 167  --   --  160  --  155    < > = values in this interval not displayed.     BMP   Results from last 7 days   Lab Units 08/10/21  0319 08/09/21  0717 08/08/21  0417 08/07/21  0405 08/06/21  0608 08/05/21  1532 08/05/21  1049   SODIUM mmol/L 129* 135* 131* 133* 139 139 138   POTASSIUM mmol/L 4.0 4.1 4.4 4.6 4.5 4.6 4.7   CHLORIDE mmol/L 97* 99 99 102 106 106 105   CO2 mmol/L 25.0 26.0 23.0 23.0 22.0 23.0 25.0   BUN mg/dL 12 14 25* 20 17 15 12   CREATININE mg/dL 0.73* 0.82 1.10 1.35* 1.28* 1.46* 1.48*   GLUCOSE mg/dL 110* 116* 117* 124* 127* 151* 98   MAGNESIUM mg/dL  --   --   --   --  3.1* 2.7* 3.1*   PHOSPHORUS mg/dL  --   --   --   --  5.0* 3.4 4.6*     CMP   Results from last 7 days   Lab Units 08/10/21  0319 08/09/21  0717 08/08/21  0417 08/07/21  0405 08/06/21  0608 08/05/21  1532  08/05/21  1049   SODIUM mmol/L 129* 135* 131* 133* 139 139 138   POTASSIUM mmol/L 4.0 4.1 4.4 4.6 4.5 4.6 4.7   CHLORIDE mmol/L 97* 99 99 102 106 106 105   CO2 mmol/L 25.0 26.0 23.0 23.0 22.0 23.0 25.0   BUN mg/dL 12 14 25* 20 17 15 12   CREATININE mg/dL 0.73* 0.82 1.10 1.35* 1.28* 1.46* 1.48*   GLUCOSE mg/dL 110* 116* 117* 124* 127* 151* 98   ALBUMIN g/dL  --   --   --   --  4.40 4.70 4.30     Cardiac Studies:  Echo- Results for orders placed during the hospital encounter of 04/27/21    Adult Transesophageal Echo (RAFAT) W/ Cont if Necessary Per Protocol    Interpretation Summary  · Left ventricular systolic function is normal.  · Left ventricular ejection fraction is 60 to 65%  · Left ventricular wall thickness is consistent with mild concentric hypertrophy.  · Left ventricular diastolic function was normal.  · Moderate aortic valve regurgitation is present.  · Severe aortic valve stenosis is present. Aortic valve area is 0.54 cm2.  · Peak velocity of the flow distal to the aortic valve is 483.5 cm/s. Aortic valve maximum pressure gradient is 93.5 mmHg. Aortic valve mean pressure gradient is 47.4 mmHg.  · Saline test results are negative.    Stress Myoview-  Cath-      Medication Review:   Scheduled Meds:aspirin, 81 mg, Oral, Daily  aspirin, 325 mg, Oral, Once  atorvastatin, 40 mg, Oral, Nightly  budesonide, 0.5 mg, Nebulization, BID - RT  chlorhexidine, 15 mL, Mouth/Throat, Q12H  enoxaparin, 40 mg, Subcutaneous, Daily  ferrous sulfate, 324 mg, Oral, Daily With Breakfast  furosemide, 40 mg, Oral, Daily  gabapentin, 100 mg, Oral, TID  insulin lispro, 0-7 Units, Subcutaneous, TID AC  ipratropium-albuterol, 3 mL, Nebulization, 4x Daily - RT  metoclopramide, 10 mg, Intravenous, Q6H  metoprolol tartrate, 25 mg, Oral, Q12H  mupirocin, , Each Nare, BID  potassium chloride, 20 mEq, Oral, Daily  Scopolamine, 1 patch, Transdermal, Q72H  senna-docusate sodium, 2 tablet, Oral, BID      Continuous Infusions:   PRN Meds:.•   acetaminophen **OR** acetaminophen **OR** acetaminophen  •  bisacodyl  •  bisacodyl  •  dextrose  •  dextrose  •  glucagon (human recombinant)  •  HYDROcodone-acetaminophen  •  HYDROcodone-acetaminophen  •  insulin lispro **AND** insulin lispro  •  magnesium hydroxide  •  [DISCONTINUED] Morphine **AND** naloxone  •  ondansetron  •  polyethylene glycol  •  potassium chloride **OR** potassium chloride      Assessment/Plan   Patient Active Problem List   Diagnosis   • Acute on chronic blood loss anemia   • Essential hypertension   • Hyperlipidemia   • Chronic GERD   • CVA (cerebral vascular accident) (CMS/HCC)   • Hypertrophy of prostate without urinary obstruction and other lower urinary tract symptoms (LUTS)   • Iron deficiency anemia   • Pure hypercholesterolemia   • Solitary pulmonary nodule   • Shortness of breath   • Chest discomfort   • Aortic stenosis, severe   • Aortic stenosis   • GI bleed   • S/P AVR (tissue) by Dr. Padilla 8/5/2021     MDM:    1.  Aortic stenosis/s/p AVR:    Patient is doing well.  Hemodynamics are stable.  Blood pressure is slightly high.    2.  Hypertension:    I would recommend to increase Lopressor to 25 mg twice daily.  Monitor blood pressure    3.  Hyperlipidemia:    Patient is on Lipitor.    Anticipate d/c home later today  Rhythm and hemodynamics stable  OP f/u with Dr. Fajardo in 4 weeks in [place]    Patient is seen and examined and findings are verified.  Patient is feeling much better.  Patient wants to go home.    Hemodynamics are stable    Normal S1 and S2.  No pericardial rub.  Chest is clear to auscultation.  No leg edema.    His blood pressure is very well controlled.  Patient is on Lopressor 25 mg twice daily.  He tolerated well.  Patient can be discharged.  I will see the patient in 1 month    Darian Fajardo MD  08/10/21  11:13 EDT

## 2021-08-10 NOTE — DISCHARGE SUMMARY
Date of Admission: 8/5/2021  Date of Discharge: 8/10/2021    Discharge Diagnosis:   -Severe AS, EF 60-65% s/p mini AVR ([place])  -acute on chronic anemia of unknown source, preop Hb 9.5--PRBC 8/8  -HTN--stable  -CKD (preop creatinine 1.2-1.5)  -Hx stroke--CT head in May, mild atrophy  -Emphysema with home oxygen use--per CT scan, can not r/o ILD, DLCO 26  -Lung nodules--c/w granulomatous disease  -Post op nausea----scopalamine  -post op leukocytosis----reactive and resolving      Presenting Problem/History of Present Illness  Aortic stenosis [I35.0]     Hospital Course  Patient is a 76 y.o. male presented from home the morning of surgery.  On 8/5/2021, he underwent minimally invasive tissue AVR by Dr. Padilla.  Please see op note for full details.  He did experience postop nausea and vomiting that resolved before discharge home.  He has chronic unexplained anemia preop and did require transfusion postop.  By POD#5, he was down to his 2L oxygen as per home.  He stayed an additional day to make sure he was stable on his feet given he lives alone and will likely not let his family spend the night with him.  At discharge, his hgb was 9.7.  His surgical wounds are healing nicely.  [facility] nursing to follow and check BMP on Friday to re-evaluate his sodium level of 129.  Extra diuretics were given to him prior to discharge.    Procedures Performed  Per Dr. Padilla 8/5/2021  1.  Elective minimally invasive (J upper mini sternotomy ) AVR with a 25 mm magna pericardial prosthesis       Consults:   Consults     Date and Time Order Name Status Description    8/5/2021 12:22 PM Inpatient Cardiology Consult      7/25/2021  6:04 PM Inpatient Cardiology Consult Completed     7/25/2021  6:04 PM Inpatient Gastroenterology Consult Completed           Pertinent Test Results:    Lab Results   Component Value Date    WBC 11.00 (H) 08/09/2021    HGB 9.7 (L) 08/09/2021    HCT 29.1 (L) 08/09/2021    MCV 83.1 08/09/2021     08/09/2021       Lab Results   Component Value Date    GLUCOSE 110 (H) 08/10/2021    CALCIUM 8.2 (L) 08/10/2021     (L) 08/10/2021    K 4.0 08/10/2021    CO2 25.0 08/10/2021    CL 97 (L) 08/10/2021    BUN 12 08/10/2021    CREATININE 0.73 (L) 08/10/2021    EGFRIFNONA 104 08/10/2021    BCR 16.4 08/10/2021    ANIONGAP 7.0 08/10/2021     Lab Results   Component Value Date    INR 1.04 08/06/2021    PROTIME 11.5 08/06/2021         Condition on Discharge: Stable for discharge to home with F HH nsg, rolling walker, family assistance, and continued home oxygen at 2L.    Vital Signs  Temp:  [97.3 °F (36.3 °C)-99.6 °F (37.6 °C)] 97.3 °F (36.3 °C)  Heart Rate:  [78-97] 95  Resp:  [19-24] 19  BP: (103-156)/(48-82) 137/82      Discharge Disposition  Home-Health Care [facility]    Discharge Medications     Discharge Medications      New Medications      Instructions Start Date   acetaminophen 325 MG tablet  Commonly known as: TYLENOL   650 mg, Oral, Every 4 Hours PRN      aspirin 81 MG EC tablet   81 mg, Oral, Daily   Start Date: August 11, 2021     ferrous sulfate 324 (65 Fe) MG tablet delayed-release EC tablet   324 mg, Oral, Daily With Breakfast   Start Date: August 11, 2021     metoprolol tartrate 25 MG tablet  Commonly known as: LOPRESSOR   25 mg, Oral, Every 12 Hours Scheduled         Changes to Medications      Instructions Start Date   furosemide 40 MG tablet  Commonly known as: LASIX  What changed: Another medication with the same name was added. Make sure you understand how and when to take each.   40 mg, Oral, Daily      furosemide 10 MG/ML injection  Commonly known as: LASIX  What changed: You were already taking a medication with the same name, and this prescription was added. Make sure you understand how and when to take each.   20 mg, Intravenous, Once         Continue These Medications      Instructions Start Date   albuterol sulfate  (90 Base) MCG/ACT inhaler  Commonly known as: PROVENTIL HFA;VENTOLIN HFA;PROAIR HFA   2  puffs, Inhalation, Every 4 Hours PRN, Pt states has never used      pantoprazole 40 MG EC tablet  Commonly known as: PROTONIX   40 mg, Oral, Daily      potassium chloride 20 MEQ CR tablet  Commonly known as: K-DURKLOR-CON   20 mEq, Oral, Daily      simvastatin 40 MG tablet  Commonly known as: ZOCOR   40 mg, Oral, Nightly      terazosin 5 MG capsule  Commonly known as: HYTRIN   5 mg, Oral, Nightly         Stop These Medications    lisinopril 20 MG tablet  Commonly known as: PRINIVIL,ZESTRIL     lovastatin 20 MG tablet  Commonly known as: MEVACOR     mupirocin 2 % ointment  Commonly known as: Bactroban     potassium chloride ER 20 MEQ tablet controlled-release ER tablet  Commonly known as: K-TAB            Discharge Diet:   Healthy Heart Diet    Activity at Discharge:   As tolerated, no lifting > 10 pounds x 6 weeks    Follow-up Appointments  Future Appointments   Date Time Provider Department Center   9/9/2021  1:00 PM Clare Harris APRN [facility]     Additional Instructions for the Follow-ups that You Need to Schedule     Ambulatory Referral to Home Health   As directed      [facility] H/H TO EVAL AND TREAT    Order Comments: [facility] H/H TO EVAL AND TREAT     Face to Face Visit Date: 8/9/2021    Follow-up provider for Plan of Care?: I treated the patient in an acute care facility and will not continue treatment after discharge.    Follow-up provider: MARGY KENT [[id number]]    Reason/Clinical Findings: POST OP CABG    Describe mobility limitations that make leaving home difficult: weakness    Nursing/Therapeutic Services Requested: Skilled Nursing Physical Therapy    Skilled nursing orders: Post CABG care    PT orders: Strengthening    Frequency: 1 Week 1         Discharge Follow-up with PCP   As directed       Currently Documented PCP:    Garcia Johnson MD    PCP Phone Number:    [phone]     Follow Up Details: in one month         Discharge Follow-up with Specialty: Cardiology; 1 Month   As  directed      Specialty: Cardiology    Follow Up: 1 Month    Follow Up Details: Dr. Fajardo--call for an appt         Discharge Follow-up with Specified Provider: Cardiac Surgery; 6 Weeks   As directed      To: Cardiac Surgery    Follow Up: 6 Weeks    Follow Up Details: RADHA Mccarthy 9/9/2021 at 1 pm         Referral to Cardiac Rehab   As directed      Basic Metabolic Panel    Aug 13, 2021 (Approximate)      To be drawn by [employer], send to Dr. Fajardo    Order Comments: To be drawn by [facility], send to Dr. Fajardo     Release to patient: Immediate         Call MD With Problems / Concerns    Sep 10, 2021 (Approximate)      Instructions: Call for temp >101 or any surgical wound drainage    Order Comments: Instructions: Call for temp >101 or any surgical wound drainage                Test Results Pending at Discharge:  none    STS infomation:  Discharged on asa/bb/statin and diuretics.  Lisinopril will be re-evaluated at follow up appts d/t soft BP.       RADHA Whittington  08/10/21  09:06 EDT

## 2021-08-10 NOTE — THERAPY TREATMENT NOTE
Subjective: Pt agreeable to therapeutic plan of care.    Objective:     Bed mobility - N/A or Not attempted.  Transfers - SBA- RW  Ambulation - 175 ft SBA RW, 225 ft SBA RW    Cardiac Level: 4    Pain: 0 VAS  Education: Provided education on importance of mobility and skilled verbal / tactile cueing throughout intervention.     Assessment: Chong Manuel presents with functional mobility impairments which indicate the need for skilled intervention. Tolerating session today without incident. Pt on 2L hi-carley.  Pt BP WNL and showed no signs of orthostatic hypotension.  Pt ambulates for multiple bouts, exhibiting progress towards mobility goals.  Pt required no verbal cues for following sternal precautions throughout treatment session.  Pt has strong family support and is safe to return home with family assist and HHPT upon d/c.  Will continue to follow and progress as tolerated.     Plan/Recommendations:   Pt would benefit from Home with Home Health and Home with family assist at discharge from facility and requires no DME at discharge.   Pt desires Home with Home Health and Home with family assist at discharge. Pt cooperative; agreeable to therapeutic recommendations and plan of care.     Basic Mobility 6-click:  Rollin = Total, A lot = 2, A little = 3; 4 = None  Supine>Sit:   1 = Total, A lot = 2, A little = 3; 4 = None   Sit>Stand with arms:  1 = Total, A lot = 2, A little = 3; 4 = None  Bed>Chair:   1 = Total, A lot = 2, A little = 3; 4 = None  Ambulate in room:  1 = Total, A lot = 2, A little = 3; 4 = None  3-5 Steps with railin = Total, A lot = 2, A little = 3; 4 = None  Score: 16      Post-Tx Position: Up in Chair, Alarms activated and Call light and personal items within reach, family present  PPE: gloves, surgical mask, eyewear protection

## 2021-08-10 NOTE — DISCHARGE INSTRUCTIONS
Coronary Artery Bypass Grafting, Care After  This sheet gives you information about how to care for yourself after your procedure. Your doctor may also give you more specific instructions. If you have problems or questions, call your doctor.  What can I expect after the procedure?  After the procedure, it is common to:  · Feel sick to your stomach (nauseous).  · Not want to eat as much as normal (lack of appetite).  · Have trouble pooping (constipation).  · Have weakness and tiredness (fatigue).  · Feel sad (depressed) or grouchy (irritable).  · Have pain or discomfort around the cuts from surgery (incisions).  Follow these instructions at home:  Medicines  · Take over-the-counter and prescription medicines only as told by your doctor. Do not stop taking medicines or start any new medicines unless your doctor says it is okay.  · If you were prescribed an antibiotic medicine, take it as told by your doctor. Do not stop taking the antibiotic even if you start to feel better.  Incision care    · Follow instructions from your doctor about how to take care of your cuts from surgery. Make sure you:  ? Wash your hands with soap and water before and after you change your bandage (dressing). If you cannot use soap and water, use hand .  ? Change your bandage as told by your doctor.  ? Leave stitches (sutures), skin glue, or skin tape (adhesive) strips in place. They may need to stay in place for 2 weeks or longer. If tape strips get loose and curl up, you may trim the loose edges. Do not remove tape strips completely unless your doctor says it is okay.  · Make sure the surgery cuts are clean, dry, and protected.  · Check your cut areas every day for signs of infection. Check for:  ? More redness, swelling, or pain.  ? More fluid or blood.  ? Warmth.  ? Pus or a bad smell.  · If cuts were made in your legs:  ? Avoid crossing your legs.  ? Avoid sitting for long periods of time. Change positions every 30  minutes.  ? Raise (elevate) your legs when you are sitting.  Bathing  · Do not take baths, swim, or use a hot tub until your doctor says it is okay.  · Only take sponge baths. Pat the surgery cuts dry. Do not rub the cuts to dry.  · Ask your doctor when you can shower.  Eating and drinking    · Eat foods that are high in fiber, such as beans, nuts, whole grains, and raw fruits and vegetables. Any meats you eat should be lean cut. Avoid canned, processed, and fried foods. This can help prevent trouble pooping. This is also a part of a heart-healthy diet.  · Drink enough fluid to keep your pee (urine) pale yellow.  · Do not drink alcohol until you are fully recovered. Ask your doctor when it is safe to drink alcohol.  Activity  · Rest and limit your activity as told by your doctor. You may be told to:  ? Stop any activity right away if you have chest pain, shortness of breath, irregular heartbeats, or dizziness. Get help right away if you have any of these symptoms.  ? Move around often for short periods or take short walks as told by your doctor. Slowly increase your activities.  ? Avoid lifting, pushing, or pulling anything that is heavier than 10 lb (4.5 kg) for at least 6 weeks or as told by your doctor.  · Do physical therapy or a cardiac rehab (cardiac rehabilitation) program as told by your doctor.  ? Physical therapy involves doing exercises to maintain movement and build strength and endurance.  ? A cardiac rehab program includes:  § Exercise training.  § Education.  § Counseling.  · Do not drive until your doctor says it is okay.  · Ask your doctor when you can go back to work.  · Ask your doctor when you can be sexually active.  General instructions  · Do not drive or use heavy machinery while taking prescription pain medicine.  · Do not use any products that contain nicotine or tobacco. These include cigarettes, e-cigarettes, and chewing tobacco. If you need help quitting, ask your doctor.  · Take 2-3 deep  "breaths every few hours during the day while you get better. This helps expand your lungs and prevent problems.  · If you were given a device called an incentive spirometer, use it several times a day to practice deep breathing. Support your chest with a pillow or your arms when you take deep breaths or cough.  · Wear compression stockings as told by your doctor.  · Weigh yourself every day. This helps to see if your body is holding (retaining) fluid that may make your heart and lungs work harder.  · Keep all follow-up visits as told by your doctor. This is important.  Contact a doctor if:  · You have more redness, swelling, or pain around any cut.  · You have more fluid or blood coming from any cut.  · Any cut feels warm to the touch.  · You have pus or a bad smell coming from any cut.  · You have a fever.  · You have swelling in your ankles or legs.  · You have pain in your legs.  · You gain 2 lb (0.9 kg) or more a day.  · You feel sick to your stomach or you throw up (vomit).  · You have watery poop (diarrhea).  Get help right away if:  · You have chest pain that goes to your jaw or arms.  · You are short of breath.  · You have a fast or irregular heartbeat.  · You notice a \"clicking\" in your breastbone (sternum) when you move.  · You have any signs of a stroke. \"BE FAST\" is an easy way to remember the main warning signs:  ? B - Balance. Signs are dizziness, sudden trouble walking, or loss of balance.  ? E - Eyes. Signs are trouble seeing or a change in how you see.  ? F - Face. Signs are sudden weakness or loss of feeling of the face, or the face or eyelid drooping on one side.  ? A - Arms. Signs are weakness or loss of feeling in an arm. This happens suddenly and usually on one side of the body.  ? S - Speech. Signs are sudden trouble speaking, slurred speech, or trouble understanding what people say.  ? T - Time. Time to call emergency services. Write down what time symptoms started.  · You have other signs of " a stroke, such as:  ? A sudden, very bad headache with no known cause.  ? Feeling sick to your stomach.  ? Throwing up.  ? Jerky movements you cannot control (seizure).  These symptoms may be an emergency. Do not wait to see if the symptoms will go away. Get medical help right away. Call your local emergency services (911 in the [place] Do not drive yourself to the hospital.  Summary  · After the procedure, it is common to have pain or discomfort in the cuts from surgery (incisions).  · Do not take baths, swim, or use a hot tub until your doctor says it is okay.  · Slowly increase your activities. You may need physical therapy or cardiac rehab.  · Weigh yourself every day. This helps to see if your body is holding fluid.  This information is not intended to replace advice given to you by your health care provider. Make sure you discuss any questions you have with your health care provider.  Document Revised: 08/27/2019 Document Reviewed: 08/27/2019  Elsevier Patient Education © 2021 [employer]     Rhomboid Transposition Flap Text: The defect edges were debeveled with a #15 scalpel blade.  Given the location of the defect and the proximity to free margins a rhomboid transposition flap was deemed most appropriate.  Using a sterile surgical marker, an appropriate rhomboid flap was drawn incorporating the defect.    The area thus outlined was incised deep to adipose tissue with a #15 scalpel blade.  The skin margins were undermined to an appropriate distance in all directions utilizing iris scissors.

## 2021-08-11 ENCOUNTER — HOME CARE VISIT (OUTPATIENT)
Dept: HOME HEALTH SERVICES | Facility: HOME HEALTHCARE | Age: 76
End: 2021-08-11

## 2021-08-11 ENCOUNTER — READMISSION MANAGEMENT (OUTPATIENT)
Dept: CALL CENTER | Facility: HOSPITAL | Age: 76
End: 2021-08-11

## 2021-08-11 NOTE — OUTREACH NOTE
Prep Survey      Responses   [facility]   Is LACE score < 7 ?  No   Emergency Room discharge w/ pulse ox?  No   Eligibility  Readm Mgmt   Discharge diagnosis  Elective minimally invasive (J upper mini sternotomy ) AVR with a 25 mm magna pericardial prosthesis   Does the patient have one of the following disease processes/diagnoses(primary or secondary)?  Cardiothoracic surgery   Does the patient have Home health ordered?  Yes   What is the Home health agency?   [facility]   Is there a DME ordered?  Yes   What DME was ordered?  RW   Comments regarding appointments  call for apmt   Medication alerts for this patient  lasix   Prep survey completed?  Yes          Pilar Cortes RN

## 2021-08-12 ENCOUNTER — LAB REQUISITION (OUTPATIENT)
Dept: LAB | Facility: HOSPITAL | Age: 76
End: 2021-08-12

## 2021-08-12 ENCOUNTER — HOME CARE VISIT (OUTPATIENT)
Dept: HOME HEALTH SERVICES | Facility: HOME HEALTHCARE | Age: 76
End: 2021-08-12

## 2021-08-12 VITALS
TEMPERATURE: 97.7 F | BODY MASS INDEX: 26.7 KG/M2 | HEART RATE: 76 BPM | RESPIRATION RATE: 20 BRPM | OXYGEN SATURATION: 90 % | WEIGHT: 166.13 LBS | DIASTOLIC BLOOD PRESSURE: 74 MMHG | SYSTOLIC BLOOD PRESSURE: 142 MMHG | HEIGHT: 66 IN

## 2021-08-12 DIAGNOSIS — N18.9 CHRONIC KIDNEY DISEASE, UNSPECIFIED: ICD-10-CM

## 2021-08-12 LAB
ANION GAP SERPL CALCULATED.3IONS-SCNC: 13 MMOL/L (ref 5–15)
BUN SERPL-MCNC: 11 MG/DL (ref 8–23)
BUN/CREAT SERPL: 11.6 (ref 7–25)
CALCIUM SPEC-SCNC: 8.6 MG/DL (ref 8.6–10.5)
CHLORIDE SERPL-SCNC: 101 MMOL/L (ref 98–107)
CO2 SERPL-SCNC: 25 MMOL/L (ref 22–29)
CREAT SERPL-MCNC: 0.95 MG/DL (ref 0.76–1.27)
GFR SERPL CREATININE-BSD FRML MDRD: 77 ML/MIN/1.73
GLUCOSE SERPL-MCNC: 70 MG/DL (ref 65–99)
POTASSIUM SERPL-SCNC: 4.7 MMOL/L (ref 3.5–5.2)
SODIUM SERPL-SCNC: 139 MMOL/L (ref 136–145)

## 2021-08-12 PROCEDURE — 80048 BASIC METABOLIC PNL TOTAL CA: CPT | Performed by: THORACIC SURGERY (CARDIOTHORACIC VASCULAR SURGERY)

## 2021-08-12 PROCEDURE — G0299 HHS/HOSPICE OF RN EA 15 MIN: HCPCS

## 2021-08-13 ENCOUNTER — TELEPHONE (OUTPATIENT)
Dept: CARDIOLOGY | Facility: CLINIC | Age: 76
End: 2021-08-13

## 2021-08-13 ENCOUNTER — HOME CARE VISIT (OUTPATIENT)
Dept: HOME HEALTH SERVICES | Facility: HOME HEALTHCARE | Age: 76
End: 2021-08-13

## 2021-08-13 VITALS
SYSTOLIC BLOOD PRESSURE: 138 MMHG | BODY MASS INDEX: 26.71 KG/M2 | HEART RATE: 73 BPM | OXYGEN SATURATION: 91 % | TEMPERATURE: 98.2 F | DIASTOLIC BLOOD PRESSURE: 70 MMHG | RESPIRATION RATE: 18 BRPM | HEIGHT: 66 IN | WEIGHT: 166.23 LBS

## 2021-08-13 PROCEDURE — G0151 HHCP-SERV OF PT,EA 15 MIN: HCPCS

## 2021-08-13 NOTE — TELEPHONE ENCOUNTER
Nazanin with [facility] calling  Patient needs lasix 40 mg 1 daily sent to [facility] in [place] He is completely out

## 2021-08-13 NOTE — CASE COMMUNICATION
Huddle Summary POD 3 - 8/13/2021  Insurance: [employer] ([employer])  SN SOC on 8/12/2021  Patient returned home 8/10/2021 following aortic valve replacement.  PT Eval scheduled for this day (8/13/2021).  SN reminded to send note requesting auth for visits.    Participating: Chris Queen, PT, Nazanin Argueta, RN; Via electronic communication: Deonna White RN, Clinical Manager.

## 2021-08-13 NOTE — HOME HEALTH
76 year old male lives in 1 level home alone has family that will assist as needed.Retired . Daughter in law stayed with him last. night. Reports problem began in April went to local ER due to increased shortness of breath. 4/27/2021 had cardiac cath showing aortic stenosis. Increasing shortness of breath lead to surgery 8/5 with tissue valve replacement. Home 8/10/2021 Reports more soreness than pain. Incision edges well approximated no redness swelling or drainage. Drain sites x 2 scabbed and intact. no redness or drainage. Ambulated to living room upon SN arrival gait slow steady reports does have walker which he uses at times makes feel more secure. Oxygen on at 2L per cannula reports started using in April of this year. Noted to have dyspnea increases with exertion reports notices it sometimes at rest. No pedal edema noted. Reports bm 8/11/2021 Reports appetite good discussed limiting use of salt and fats in diet voiced understanding. Venipuncture using 23 G butterfly x 1 attempt left a/c blood obtained for BMP transported to [facility]. To be faxed to Dr. Fajardo. Erick. well band aid applied.     Afercare circulatory system   Aortic stenosis  HTN   CKD   Emphysema   Oxgen use  former smoker

## 2021-08-13 NOTE — CASE COMMUNICATION
PT Evaluation scheduled for 8/13/2021 cancelled by patient's caregiver/spouse due to home reportedly infested with bed bugs.  Home treatment schduled for Tuesday, 8/17/2021.  PT evaluation to be rescheduled to 8/18/2021 per caregiver request.

## 2021-08-13 NOTE — CASE COMMUNICATION
HUD SOC – HUDDLE START OF CARE    Caregiver Status: lives alone children willing to help as needed  Availability: as needed  Ability to meet patient's needs: able  Willingness: willing     Risk for Hospitalization: high     Patient's goal(s): Return to independent level of care    Services required to achieve goals: SN PT    Potential Issues for goal attainment:     Discipline Assessment Updates:     Problems identified:    Disease processes: pot. for post op complications   Acuity and severity:  Comorbidities: CKD HTN Emphysema   Level of independence with management:     Knowledge deficits: regarding post op care and restrictions   Current conditions:  Medications: knowledgeable of meds and what they are for    Care procedures:    Functional status and safety:    Mobility:suggested use of walker until stronger     Self-care:     ADLs: no lifting greater than 10 pounds mopping sweeping mowing    Any Duplication of Services: No    Environmental issues:  physical environment: safe for home health needs  set up/compatibility with patients needs  accessibility and safety 1 step into door    Equipment/Adjunct Services:  Devices for care present in the home: walker shower chair   Devices needed and not present:     Community resources involved/needed:  Dialysis:  Transportation:   Meals on Wheels:    Any additional needs:    Based upon record review and collaboration conference, the recommended frequency for this patient is:     SN----- 1 wk 5    PT----- 1 wk1

## 2021-08-13 NOTE — CASE COMMUNICATION
Previous note regarding evaluation cancellation sent in error. Patient to be seen for PT evaluation this day. 8/13/2021.

## 2021-08-14 NOTE — HOME HEALTH
PT Evaluation Summary: The patient is a 76 year old male admitted to home health services by SN on 8/12/2021 following aortic valve replacement 8/5/2021 and return home on 8/10/2021.    Past medical history includes: Aortic stenosis, HTN, CKD, Emphysema, past history of lung infection, supplemental oxygen use 2 lpm (started intimally 4/2021).    Prior Functional Level: Increasing limitations in mobility but was independence in self-care ADL and home mobility.    Social History: Lives alone, family stays with patient as needed and assists with transportation and ADL as necessary. Patient's DIL Cassi currently with patient and assists as needed.    PT Assessment this day (8/13/2021) reveals the problems of general LE weakness (lower extremities graded 4/5 throughout), impaired transfers (requires stand-by assistance), limited ambulation tolerance (100 feet with stand-by assistance), deoxygenation with gait (as low as 88% after 100 feet of ambulation).    Plan for next visit: Advance gait as able; continue to emphasize breathing during gait and activities, advance standing exercises.

## 2021-08-15 RX ORDER — FUROSEMIDE 40 MG/1
40 TABLET ORAL DAILY
Qty: 30 TABLET | Refills: 1 | Status: SHIPPED | OUTPATIENT
Start: 2021-08-15 | End: 2021-09-09

## 2021-08-16 PROCEDURE — G0180 MD CERTIFICATION HHA PATIENT: HCPCS | Performed by: THORACIC SURGERY (CARDIOTHORACIC VASCULAR SURGERY)

## 2021-08-17 ENCOUNTER — HOME CARE VISIT (OUTPATIENT)
Dept: HOME HEALTH SERVICES | Facility: HOME HEALTHCARE | Age: 76
End: 2021-08-17

## 2021-08-17 VITALS
TEMPERATURE: 98.1 F | SYSTOLIC BLOOD PRESSURE: 124 MMHG | DIASTOLIC BLOOD PRESSURE: 68 MMHG | HEART RATE: 79 BPM | RESPIRATION RATE: 18 BRPM

## 2021-08-17 PROCEDURE — G0299 HHS/HOSPICE OF RN EA 15 MIN: HCPCS

## 2021-08-19 ENCOUNTER — HOME CARE VISIT (OUTPATIENT)
Dept: HOME HEALTH SERVICES | Facility: HOME HEALTHCARE | Age: 76
End: 2021-08-19

## 2021-08-19 PROCEDURE — G0157 HHC PT ASSISTANT EA 15: HCPCS

## 2021-08-20 ENCOUNTER — READMISSION MANAGEMENT (OUTPATIENT)
Dept: CALL CENTER | Facility: HOSPITAL | Age: 76
End: 2021-08-20

## 2021-08-20 VITALS
HEART RATE: 69 BPM | DIASTOLIC BLOOD PRESSURE: 78 MMHG | OXYGEN SATURATION: 99 % | TEMPERATURE: 97.5 F | SYSTOLIC BLOOD PRESSURE: 128 MMHG

## 2021-08-20 NOTE — OUTREACH NOTE
CT Surgery Week 2 Survey      Responses   [facility] patient discharged from?  [facility]   Does the patient have one of the following disease processes/diagnoses(primary or secondary)?  Cardiothoracic surgery   Week 2 attempt successful?  Yes   Call start time  0943   Call end time  0950   Discharge diagnosis  Elective minimally invasive (J upper mini sternotomy ) AVR with a 25 mm magna pericardial prosthesis   Meds reviewed with patient/caregiver?  Yes   Is the patient having any side effects they believe may be caused by any medication additions or changes?  No   Does the patient have all medications related to this admission filled (includes all antibiotics, pain medications, cardiac medications, etc.)  Yes   Is the patient taking all medications as directed (includes completed medication regime)?  Yes   Does the patient have a primary care provider?   Yes   Does the patient have an appointment scheduled with their C/T surgeon?  Yes   Comments regarding PCP  PCP 8/18/21   Has the patient kept scheduled appointments due by today?  Yes   What is the Home health agency?   [facility]   Has home health visited the patient within 72 hours of discharge?  Yes   What DME was ordered?  RW   Has all DME been delivered?  Yes   Psychosocial issues?  No   Did the patient receive a copy of their discharge instructions?  Yes   Nursing interventions  Reviewed instructions with patient   What is the patient's perception of their health status since discharge?  Improving [Pt denies any pain. Pt states he is doing very well. ]   Nursing interventions  Nurse provided patient education   Is the patient /caregiver able to teach back basic post-op care?  Take showers only when approved by MD-sponge bathe until then, No tub bath, swimming, or hot tub until instructed by MD, Keep incision areas clean, dry and protected, Lifting as instructed by MD in discharge instructions, Continue use of incentive spirometry at least 1 week post  discharge, Drive as instructed by MD in discharge instructions   Is the patient/caregiver able to teach back signs and symptoms of incisional infection?  Increased redness, swelling or pain at the incisonal site, Increased drainage or bleeding, Incisional warmth, Pus or odor from incision, Fever   Is the patient/caregiver able to teach back steps to recovery at home?  Rest and rebuild strength, gradually increase activity, Eat a well-balance diet   Is the patient /caregiver able to teach back the importance of cardiac rehab?  Yes   Nursing interventions  Provided education on importance of cardiac rehab   If the patient is a current smoker, are they able to teach back resources for cessation?  Not a smoker   Is the patient/caregiver able to teach back the hierarchy of who to call/visit for symptoms/problems? PCP, Specialist, Home health nurse, Urgent Care, ED, 911  Yes   Week 2 call completed?  Yes          Chantell Campa RN

## 2021-08-24 ENCOUNTER — HOME CARE VISIT (OUTPATIENT)
Dept: HOME HEALTH SERVICES | Facility: HOME HEALTHCARE | Age: 76
End: 2021-08-24

## 2021-08-24 VITALS
SYSTOLIC BLOOD PRESSURE: 128 MMHG | OXYGEN SATURATION: 96 % | DIASTOLIC BLOOD PRESSURE: 72 MMHG | HEART RATE: 80 BPM | RESPIRATION RATE: 20 BRPM | TEMPERATURE: 97.7 F

## 2021-08-24 PROCEDURE — G0299 HHS/HOSPICE OF RN EA 15 MIN: HCPCS

## 2021-08-24 NOTE — HOME HEALTH
Sitting in chair upon SN arrival. Smiling reports doing well. Color pinker reports hasnt slept with oxygen for 4 days now denies shortness of breath. Incision edges well approximated. Discussed avoiding extreme heat of day reports has been walking in house when to hot as can walk in complete [place] so can make laps.

## 2021-08-26 ENCOUNTER — READMISSION MANAGEMENT (OUTPATIENT)
Dept: CALL CENTER | Facility: HOSPITAL | Age: 76
End: 2021-08-26

## 2021-08-26 ENCOUNTER — HOME CARE VISIT (OUTPATIENT)
Dept: HOME HEALTH SERVICES | Facility: HOME HEALTHCARE | Age: 76
End: 2021-08-26

## 2021-08-26 VITALS
TEMPERATURE: 98.8 F | HEART RATE: 67 BPM | RESPIRATION RATE: 18 BRPM | OXYGEN SATURATION: 98 % | SYSTOLIC BLOOD PRESSURE: 120 MMHG | DIASTOLIC BLOOD PRESSURE: 70 MMHG

## 2021-08-26 PROCEDURE — G0151 HHCP-SERV OF PT,EA 15 MIN: HCPCS

## 2021-08-26 NOTE — OUTREACH NOTE
CT Surgery Week 3 Survey      Responses   [facility] patient discharged from?  [facility]   Does the patient have one of the following disease processes/diagnoses(primary or secondary)?  Cardiothoracic surgery   Week 3 attempt successful?  Yes   Call start time  1749   Call end time  1750   Discharge diagnosis  Elective minimally invasive (J upper mini sternotomy ) AVR with a 25 mm magna pericardial prosthesis   Meds reviewed with patient/caregiver?  Yes   Is the patient having any side effects they believe may be caused by any medication additions or changes?  No   Does the patient have all medications related to this admission filled (includes all antibiotics, pain medications, cardiac medications, etc.)  Yes   Is the patient taking all medications as directed (includes completed medication regime)?  Yes   Does the patient have a primary care provider?   Yes   Does the patient have an appointment scheduled with their C/T surgeon?  Yes   Comments regarding PCP  PCP 8/18/21   Has the patient kept scheduled appointments due by today?  Yes   What is the Home health agency?   [facility]   Has home health visited the patient within 72 hours of discharge?  Yes   What DME was ordered?  RW   Has all DME been delivered?  Yes   Psychosocial issues?  No   Did the patient receive a copy of their discharge instructions?  Yes   Nursing interventions  Reviewed instructions with patient   What is the patient's perception of their health status since discharge?  Improving   Nursing interventions  Nurse provided patient education   Is the patient /caregiver able to teach back basic post-op care?  Take showers only when approved by MD-sponge bathe until then, No tub bath, swimming, or hot tub until instructed by MD, Keep incision areas clean, dry and protected, Lifting as instructed by MD in discharge instructions, Continue use of incentive spirometry at least 1 week post discharge, Drive as instructed by MD in discharge instructions    Is the patient/caregiver able to teach back signs and symptoms of incisional infection?  Increased redness, swelling or pain at the incisonal site, Increased drainage or bleeding, Incisional warmth, Pus or odor from incision, Fever   Is the patient/caregiver able to teach back steps to recovery at home?  Rest and rebuild strength, gradually increase activity, Eat a well-balance diet   Is the patient /caregiver able to teach back the importance of cardiac rehab?  Yes   Nursing interventions  Provided education on importance of cardiac rehab   If the patient is a current smoker, are they able to teach back resources for cessation?  Not a smoker   Is the patient/caregiver able to teach back the hierarchy of who to call/visit for symptoms/problems? PCP, Specialist, Home health nurse, Urgent Care, ED, 911  Yes   Week 3 call completed?  Yes          Dav Chowdhury RN

## 2021-08-26 NOTE — HOME HEALTH
pt reports feeling very well and states only a little tenderness in right chest but well managed.  Pt states he is no longer dependent on supplemental O2 and only uses wheeled walker for stability with standing ther ex.  Pt tolerated all ther ex and mobility well this date with no complaints and is prepared for PT discharge at next visit.

## 2021-09-01 ENCOUNTER — HOME CARE VISIT (OUTPATIENT)
Dept: HOME HEALTH SERVICES | Facility: HOME HEALTHCARE | Age: 76
End: 2021-09-01

## 2021-09-01 VITALS
OXYGEN SATURATION: 95 % | TEMPERATURE: 97.9 F | DIASTOLIC BLOOD PRESSURE: 70 MMHG | RESPIRATION RATE: 20 BRPM | HEART RATE: 76 BPM | SYSTOLIC BLOOD PRESSURE: 150 MMHG

## 2021-09-01 PROCEDURE — G0299 HHS/HOSPICE OF RN EA 15 MIN: HCPCS

## 2021-09-02 ENCOUNTER — HOME CARE VISIT (OUTPATIENT)
Dept: HOME HEALTH SERVICES | Facility: HOME HEALTHCARE | Age: 76
End: 2021-09-02

## 2021-09-02 VITALS
OXYGEN SATURATION: 97 % | DIASTOLIC BLOOD PRESSURE: 78 MMHG | SYSTOLIC BLOOD PRESSURE: 134 MMHG | HEIGHT: 66 IN | HEART RATE: 73 BPM | WEIGHT: 156 LBS | RESPIRATION RATE: 18 BRPM | TEMPERATURE: 98.3 F | BODY MASS INDEX: 25.07 KG/M2

## 2021-09-02 PROCEDURE — G0151 HHCP-SERV OF PT,EA 15 MIN: HCPCS

## 2021-09-02 NOTE — HOME HEALTH
Sitting in chair in good spirits reports he feels good Has been walking more denies pain sees NP next week feels like he wont need cardiac rehab thinks he is doing well. Color pink continues to weigh self daily. Appetite good. Denies pain

## 2021-09-03 ENCOUNTER — HOME CARE VISIT (OUTPATIENT)
Dept: HOME HEALTH SERVICES | Facility: HOME HEALTHCARE | Age: 76
End: 2021-09-03

## 2021-09-03 NOTE — CASE COMMUNICATION
Huddle Summary POD 3 - 9/03/2021  PT Discharged 9/2/2021 due to all goals met.  SN continues at this time with likely discharge next week.    Participating: Chris Queen, PT,  Nazanin Argueta RN; Via electronic communication: Deonna White RN, Clinical Manager;

## 2021-09-03 NOTE — HOME HEALTH
PT Discharge Summary: The patient is a 76 year old male admitted to home health services by SN on 8/12/2021 following aortic valve replacement 8/5/2021 and return home on 8/10/2021.    Initial PT Assessment (8/13/2021) revealed the problems of general LE weakness (lower extremities graded 4/5 throughout), impaired transfers (requires stand-by assistance), limited ambulation tolerance (100 feet with stand-by assistance), deoxygenation with gait (as low as 88% after 100 feet of ambulation).    The patient made very good progress meeting all established PT goals. He is able to ambulate more than 300 feet in 3 minutes without adverse response independently and is independent with his final home program of progressive ambulation.     At time of PT discharge the patient remained under the care of home health skilled nursing.     Session Notes: Patient recalls that today is last PT visit scheduled. He consents to discharge from PT as planned and acknowledges that he has one final skilled nursing visit scheduled.    Returns to surgeon's office 9/9/2021.

## 2021-09-03 NOTE — CASE COMMUNICATION
PT Discharge Summary: The patient is a 76 year old male admitted to home health services by  on 8/12/2021 following aortic valve replacement 8/5/2021 and return home on 8/10/2021.    Initial PT Assessment (8/13/2021) revealed the problems of general LE weakness (lower extremities graded 4/5 throughout), impaired transfers (requires stand-by assistance), limited ambulation tolerance (100 feet with stand-by assistance), deoxygenation with gait (as low as 88% after 100 feet of ambulation).    The patient made very good progress meeting all established PT goals. He is able to ambulate more than 300 feet in 3 minutes without adverse response independently and is independent with his final home program of progressive ambulation.     At time of PT discharge the patient remained under the care of home health skilled nursing.

## 2021-09-08 ENCOUNTER — HOME CARE VISIT (OUTPATIENT)
Dept: HOME HEALTH SERVICES | Facility: HOME HEALTHCARE | Age: 76
End: 2021-09-08

## 2021-09-08 VITALS
RESPIRATION RATE: 18 BRPM | TEMPERATURE: 97.1 F | DIASTOLIC BLOOD PRESSURE: 76 MMHG | SYSTOLIC BLOOD PRESSURE: 136 MMHG | BODY MASS INDEX: 25.66 KG/M2 | HEART RATE: 72 BPM | WEIGHT: 159 LBS | OXYGEN SATURATION: 95 %

## 2021-09-08 PROCEDURE — G0299 HHS/HOSPICE OF RN EA 15 MIN: HCPCS

## 2021-09-08 NOTE — HOME HEALTH
pt. doing well, has an appt. with NP on 9/9/21. Pt. states he is feeling well, no assistance with ADL's.  Pt. is taking daily walks down the block for exercise, denies any pain, any SOB.  Pt. is active, does his own shopping, driving.  Pt. is aware and in agreeance with discharge today from [facility].

## 2021-09-09 ENCOUNTER — OFFICE VISIT (OUTPATIENT)
Dept: CARDIAC SURGERY | Facility: CLINIC | Age: 76
End: 2021-09-09

## 2021-09-09 VITALS
TEMPERATURE: 97.8 F | DIASTOLIC BLOOD PRESSURE: 85 MMHG | BODY MASS INDEX: 25.71 KG/M2 | RESPIRATION RATE: 20 BRPM | SYSTOLIC BLOOD PRESSURE: 133 MMHG | HEART RATE: 80 BPM | OXYGEN SATURATION: 96 % | HEIGHT: 66 IN | WEIGHT: 160 LBS

## 2021-09-09 DIAGNOSIS — Z95.2 S/P AVR: Primary | ICD-10-CM

## 2021-09-09 PROCEDURE — 99024 POSTOP FOLLOW-UP VISIT: CPT | Performed by: NURSE PRACTITIONER

## 2021-09-09 NOTE — PROGRESS NOTES
CARDIOVASCULAR SURGERY FOLLOW-UP PROGRESS NOTE  Chief Complaint: Post-op Follow Up        HPI:   Dear Dr. Johnson, Garcia SCOTT MD and colleagues:    It was nice to see Chong Manuel in follow up today after cardiac surgery.  As you know, he is a 76 y.o. male with severe AS, acute on chronic anemia requiring transfusion, HTN, CKD, emphysema with home oxygen use, and hx of stroke who underwent elective minimally invasive tissue AVR at [facility] by Dr. Padilla on 8/5/2021. He did well postoperatively and continues to do well. He comes in today complaining of nothing.  His activity level has been good.  He's walking daily and doing his exercises.  He wants his nocturnal oxygen discontinued.  He hasn't wore it since his son went back home.  He has been driving short distances.  He will call to get an appt with Dr. Fajardo.      Physical Exam:         There were no vitals taken for this visit.  Heart:  regular rate and rhythm  Lungs:  clear to auscultation bilaterally  Extremities:  no edema  Incision(s):  mid chest healing well, sternum stable    Assessment/Plan:     S/P elective minimally invasive tissue AVR. Overall, he is doing well.  He will discontinue his diuretic and restart it if he develops SOA and edema.  He denies any further issues with anemia.  He feels great.  He is asking me to write an order to discontinue his nocturnal oxygen but we did not order it.  I will defer this to his PCP.    No significant post-op complications    OK to drive if not taking narcotic pain medicine  OK to begin cardiac rehab  Follow-up as scheduled with cardiology--Dr. Fajardo  Follow-up as scheduled with PCP--Dr. Johnson  Follow-up with CT surgery prn    No restrictions of activity.    Current outpatient and discharge medications have been reconciled for the patient.  Reviewed by: RADHA Aragon    Thank you for allowing me to participate in the care of your patient.    Regards,  RADHA Whittington

## 2021-10-04 ENCOUNTER — OFFICE VISIT (OUTPATIENT)
Dept: CARDIOLOGY | Facility: CLINIC | Age: 76
End: 2021-10-04

## 2021-10-04 VITALS
BODY MASS INDEX: 26.84 KG/M2 | OXYGEN SATURATION: 95 % | HEIGHT: 66 IN | DIASTOLIC BLOOD PRESSURE: 75 MMHG | HEART RATE: 78 BPM | SYSTOLIC BLOOD PRESSURE: 142 MMHG | WEIGHT: 167 LBS

## 2021-10-04 DIAGNOSIS — I63.9 CEREBROVASCULAR ACCIDENT (CVA), UNSPECIFIED MECHANISM (HCC): Primary | Chronic | ICD-10-CM

## 2021-10-04 DIAGNOSIS — E78.2 MIXED HYPERLIPIDEMIA: ICD-10-CM

## 2021-10-04 DIAGNOSIS — Z95.2 S/P AVR: ICD-10-CM

## 2021-10-04 DIAGNOSIS — I10 ESSENTIAL HYPERTENSION: ICD-10-CM

## 2021-10-04 DIAGNOSIS — I35.0 NONRHEUMATIC AORTIC VALVE STENOSIS: ICD-10-CM

## 2021-10-04 PROCEDURE — 99214 OFFICE O/P EST MOD 30 MIN: CPT | Performed by: INTERNAL MEDICINE

## 2021-10-04 NOTE — PROGRESS NOTES
Date of Office Visit: 10/04/2021  Encounter Provider: Dr. Darian Fajardo    Place of Service: [facility] CARDIOLOGY [place]  Patient Name: Chong Manuel  :1945  Garcia Johnson MD    Chief Complaint   Patient presents with   • Hospital Follow Up Visit     S/P valve replacement    • Aortic Stenosis     History of Present Illness    I am pleased to see Mr. Manuel in my office today as a follow-up    As you know, patient is 76 years old white gentleman whose past medical history is significant for hypertension, hyperlipidemia, who came today for follow-up.    In 2021, patient was presented to me for shortness of breath.  Patient underwent echocardiogram which showed normal left ventricle size and function with LVEF of 55 to 60%.  Severe aortic stenosis noted with peak gradient of 100 mmHg.  Patient underwent RAFAT which confirmed findings of severe aortic stenosis.  Subsequent cardiac catheterization showed no significant CAD and transvalvular gradient on cath was 38 mm mean.  Aortic valve area was 0.8 cm².  Patient underwent AVR and did well.    Since the discharge, patient is doing well.  Patient has resumed activities of daily life without any restriction.  His shortness of breath has improved.  Patient denies any further episode of chest pain or limitation in activity.  Patient denies any orthopnea, PND, syncope or presyncope.    I am very pleased with the patient progress.  He started feeling better.  No leg edema noted.  I would recommend to start cardiac rehab.  I will see the patient in 6 months..        Past Medical History:   Diagnosis Date   • Anemia    • GERD (gastroesophageal reflux disease)    • History of transfusion    • Hyperlipidemia    • Hypertension    • Lung nodules     left    • Shortness of breath 2021   • Stroke (HCC)          Past Surgical History:   Procedure Laterality Date   • AORTIC VALVE REPAIR/REPLACEMENT N/A 2021    Procedure: AORTIC VALVE  REPAIR/REPLACEMENT;  Surgeon: Nael Padilla MD;  Location: [facility] CVOR;  Service: Cardiothoracic;  Laterality: N/A;   • CARDIAC CATHETERIZATION N/A 4/27/2021    Procedure: Left Heart Cath;  Surgeon: Darian Fajardo MD;  Location: [facility] CATH INVASIVE LOCATION;  Service: Cardiovascular;  Laterality: N/A;   • CARDIAC CATHETERIZATION N/A 4/27/2021    Procedure: Right Heart Cath;  Surgeon: Darian Fajardo MD;  Location: [facility] CATH INVASIVE LOCATION;  Service: Cardiovascular;  Laterality: N/A;   • COLONOSCOPY     • COLONOSCOPY N/A 7/27/2021    Procedure: COLONOSCOPY with polypectomy;  Surgeon: CIRA Pope MD;  Location: [facility] ENDOSCOPY;  Service: Gastroenterology;  Laterality: N/A;  post op: diverticulosis, polyp   • ENDOSCOPY N/A 7/26/2021    Procedure: ESOPHAGOGASTRODUODENOSCOPY;  Surgeon: CIRA Pope MD;  Location: [facility] ENDOSCOPY;  Service: Gastroenterology;  Laterality: N/A;  HH, gastric polyps   • HERNIA REPAIR             Current Outpatient Medications:   •  acetaminophen (TYLENOL) 325 MG tablet, Take 2 tablets by mouth Every 4 (Four) Hours As Needed for Mild Pain ., Disp: , Rfl:   •  aspirin 81 MG EC tablet, Take 1 tablet by mouth Daily., Disp: 30 tablet, Rfl: 3  •  ferrous sulfate 324 (65 Fe) MG tablet delayed-release EC tablet, Take 1 tablet by mouth Daily With Breakfast., Disp: 30 tablet, Rfl: 2  •  metoprolol tartrate (LOPRESSOR) 25 MG tablet, Take 1 tablet by mouth Every 12 (Twelve) Hours., Disp: 60 tablet, Rfl: 3  •  pantoprazole (PROTONIX) 40 MG EC tablet, Take 40 mg by mouth Daily., Disp: , Rfl:   •  simvastatin (ZOCOR) 40 MG tablet, Take 40 mg by mouth Every Night., Disp: , Rfl:   •  terazosin (HYTRIN) 5 MG capsule, Take 5 mg by mouth Every Night., Disp: , Rfl:       Social History     Socioeconomic History   • Marital status:      Spouse name: Not on file   • Number of children: Not on file   • Years of education: Not on file   • Highest education level: Not on file   Tobacco  "Use   • Smoking status: Former Smoker   • Smokeless tobacco: Never Used   Vaping Use   • Vaping Use: Never used   Substance and Sexual Activity   • Alcohol use: No   • Drug use: No   • Sexual activity: Defer         Review of Systems   Constitutional: Negative for chills and fever.   HENT: Negative for ear discharge and nosebleeds.    Eyes: Negative for discharge and redness.   Cardiovascular: Negative for chest pain, orthopnea, palpitations, paroxysmal nocturnal dyspnea and syncope.   Respiratory: Negative for cough, shortness of breath and wheezing.    Endocrine: Negative for heat intolerance.   Skin: Negative for rash.   Musculoskeletal: Negative for arthritis and myalgias.   Gastrointestinal: Negative for abdominal pain, melena, nausea and vomiting.   Genitourinary: Negative for dysuria and hematuria.   Neurological: Negative for dizziness, light-headedness, numbness and tremors.   Psychiatric/Behavioral: Negative for depression. The patient is not nervous/anxious.        Procedures    Procedures    No orders to display           Objective:    /75   Pulse 78   Ht 167.6 cm (65.98\")   Wt 75.8 kg (167 lb)   SpO2 95%   BMI 26.97 kg/m²         Constitutional:       Appearance: Well-developed.   Eyes:      General: No scleral icterus.        Right eye: No discharge.   HENT:      Head: Normocephalic and atraumatic.   Neck:      Thyroid: No thyromegaly.      Lymphadenopathy: No cervical adenopathy.   Pulmonary:      Effort: Pulmonary effort is normal. No respiratory distress.      Breath sounds: Normal breath sounds. No wheezing. No rales.   Cardiovascular:      Normal rate. Regular rhythm.      No gallop.   Abdominal:      Tenderness: There is no abdominal tenderness.   Skin:     Findings: No erythema or rash.   Neurological:      Mental Status: Alert and oriented to person, place, and time.             Assessment:       Diagnosis Plan   1. Cerebrovascular accident (CVA), unspecified mechanism (HCC)     2. " Essential hypertension     3. Mixed hyperlipidemia     4. Nonrheumatic aortic valve stenosis     5. S/P AVR (tissue) by Dr. Padilla 8/5/2021              Plan:       MDM:    1.  Aortic stenosis s/p AVR:    Patient had severe aortic stenosis and underwent AVR.  Patient did well.  Start cardiac rehab    2.  Hypertension:    Patient is on Lopressor continue current treatment blood pressure is slightly high.  Patient is advised to monitor the blood pressure and bring the log book.    3.  Hyperlipidemia:    Patient is on pravastatin.  Repeat lipid panel in future.    4.  History of CVA:    Patient does not have any residual effects.  Recommend observation risk factor modification is recommended

## 2022-02-15 ENCOUNTER — TELEPHONE (OUTPATIENT)
Dept: CARDIOLOGY | Facility: CLINIC | Age: 77
End: 2022-02-15

## 2022-02-15 NOTE — TELEPHONE ENCOUNTER
Patient was seen in [place] ER on Sunday for HTN patient said his blood pressure was over 200, patient stated that he had taken NYQUIL last week for 3 nights and he was wondering if this would have had something to do with it. Patient stated that his blood pressure was 120/57 this morning and this was after he had took his medication. This ER doctor told him to let Dr. Fajardo know

## 2022-02-21 ENCOUNTER — OFFICE VISIT (OUTPATIENT)
Dept: CARDIOLOGY | Facility: CLINIC | Age: 77
End: 2022-02-21

## 2022-02-21 VITALS
HEIGHT: 66 IN | HEART RATE: 57 BPM | BODY MASS INDEX: 28.28 KG/M2 | SYSTOLIC BLOOD PRESSURE: 145 MMHG | WEIGHT: 176 LBS | DIASTOLIC BLOOD PRESSURE: 76 MMHG | OXYGEN SATURATION: 96 %

## 2022-02-21 DIAGNOSIS — Z95.2 S/P AVR: ICD-10-CM

## 2022-02-21 DIAGNOSIS — I35.0 AORTIC STENOSIS, SEVERE: ICD-10-CM

## 2022-02-21 DIAGNOSIS — I10 ESSENTIAL HYPERTENSION: Primary | ICD-10-CM

## 2022-02-21 DIAGNOSIS — E78.2 MIXED HYPERLIPIDEMIA: ICD-10-CM

## 2022-02-21 PROCEDURE — 99214 OFFICE O/P EST MOD 30 MIN: CPT | Performed by: INTERNAL MEDICINE

## 2022-02-21 RX ORDER — CLONIDINE HYDROCHLORIDE 0.1 MG/1
0.1 TABLET ORAL 2 TIMES DAILY
COMMUNITY
End: 2022-02-21

## 2022-02-21 RX ORDER — LOSARTAN POTASSIUM 25 MG/1
25 TABLET ORAL 2 TIMES DAILY
Qty: 90 TABLET | Refills: 1 | Status: SHIPPED | OUTPATIENT
Start: 2022-02-21 | End: 2022-02-24

## 2022-02-21 NOTE — PROGRESS NOTES
Date of Office Visit: 2022  Encounter Provider: Dr. Darian Fajardo    Place of Service: [facility] CARDIOLOGY [place]  Patient Name: Chong Manuel  :1945  Garcia Johnson MD    Chief Complaint   Patient presents with   • Hypertension     History of Present Illness    I am pleased to see Mr. Manuel in my office today as a follow-up    As you know, patient is 76 years old white gentleman whose past medical history is significant for hypertension, hyperlipidemia, who came today for follow-up.    In 2021, patient was presented to me for shortness of breath.  Patient underwent echocardiogram which showed normal left ventricle size and function with LVEF of 55 to 60%.  Severe aortic stenosis noted with peak gradient of 100 mmHg.  Patient underwent RAFAT which confirmed findings of severe aortic stenosis.  Subsequent cardiac catheterization showed no significant CAD and transvalvular gradient on cath was 38 mm mean.  Aortic valve area was 0.8 cm².  Patient underwent AVR and did well.    Since the previous visit, patient brought his blood pressure logbook and his blood pressure is running low.  He had 1 episode in which blood pressure was greater than 200.  Patient denies any chest pain or tightness or heaviness.  Dizziness was reported.  No syncope or presyncope.    I would recommend to start losartan 25 mg twice daily.  I would discontinue clonidine.  I am concerned about his relative bradycardia.  Patient is advised to monitor the blood pressure and bring the logbook.      Past Medical History:   Diagnosis Date   • Anemia    • GERD (gastroesophageal reflux disease)    • History of transfusion    • Hyperlipidemia    • Hypertension    • Lung nodules     left    • Shortness of breath 2021   • Stroke (HCC)          Past Surgical History:   Procedure Laterality Date   • AORTIC VALVE REPAIR/REPLACEMENT N/A 2021    Procedure: AORTIC VALVE REPAIR/REPLACEMENT;  Surgeon: Nael Padilla  MD;  [facility];  Service: Cardiothoracic;  Laterality: N/A;   • CARDIAC CATHETERIZATION N/A 4/27/2021    Procedure: Left Heart Cath;  Surgeon: Darian Fajardo MD;  [facility];  Service: Cardiovascular;  Laterality: N/A;   • CARDIAC CATHETERIZATION N/A 4/27/2021    Procedure: Right Heart Cath;  Surgeon: Darian Fajardo MD;  [facility];  Service: Cardiovascular;  Laterality: N/A;   • COLONOSCOPY     • COLONOSCOPY N/A 7/27/2021    Procedure: COLONOSCOPY with polypectomy;  Surgeon: CIRA Pope MD;  [facility];  Service: Gastroenterology;  Laterality: N/A;  post op: diverticulosis, polyp   • ENDOSCOPY N/A 7/26/2021    Procedure: ESOPHAGOGASTRODUODENOSCOPY;  Surgeon: CIRA Pope MD;  [facility];  Service: Gastroenterology;  Laterality: N/A;  HH, gastric polyps   • HERNIA REPAIR             Current Outpatient Medications:   •  acetaminophen (TYLENOL) 325 MG tablet, Take 2 tablets by mouth Every 4 (Four) Hours As Needed for Mild Pain ., Disp: , Rfl:   •  aspirin 81 MG EC tablet, Take 1 tablet by mouth Daily., Disp: 30 tablet, Rfl: 3  •  metoprolol tartrate (LOPRESSOR) 25 MG tablet, Take 1 tablet by mouth Every 12 (Twelve) Hours., Disp: 60 tablet, Rfl: 3  •  pantoprazole (PROTONIX) 40 MG EC tablet, Take 40 mg by mouth Daily., Disp: , Rfl:   •  simvastatin (ZOCOR) 40 MG tablet, Take 40 mg by mouth Every Night., Disp: , Rfl:   •  terazosin (HYTRIN) 5 MG capsule, Take 5 mg by mouth Every Night., Disp: , Rfl:   •  losartan (Cozaar) 25 MG tablet, Take 1 tablet by mouth 2 (Two) Times a Day., Disp: 90 tablet, Rfl: 1      Social History     Socioeconomic History   • Marital status:    Tobacco Use   • Smoking status: Former Smoker   • Smokeless tobacco: Never Used   Vaping Use   • Vaping Use: Never used   Substance and Sexual Activity   • Alcohol use: No   • Drug use: No   • Sexual activity: Defer         Review of  "Systems   Constitutional: Negative for chills and fever.   HENT: Negative for ear discharge and nosebleeds.    Eyes: Negative for discharge and redness.   Cardiovascular: Negative for chest pain, orthopnea, palpitations, paroxysmal nocturnal dyspnea and syncope.   Respiratory: Positive for shortness of breath. Negative for cough and wheezing.    Endocrine: Negative for heat intolerance.   Skin: Negative for rash.   Musculoskeletal: Positive for arthritis and joint pain. Negative for myalgias.   Gastrointestinal: Negative for abdominal pain, melena, nausea and vomiting.   Genitourinary: Negative for dysuria and hematuria.   Neurological: Positive for dizziness. Negative for light-headedness, numbness and tremors.   Psychiatric/Behavioral: Negative for depression. The patient is not nervous/anxious.        Procedures    Procedures    No orders to display           Objective:    /76   Pulse 57   Ht 167.6 cm (65.98\")   Wt 79.8 kg (176 lb)   SpO2 96%   BMI 28.42 kg/m²         Constitutional:       Appearance: Well-developed.   Eyes:      General: No scleral icterus.        Right eye: No discharge.   HENT:      Head: Normocephalic and atraumatic.   Neck:      Thyroid: No thyromegaly.      Lymphadenopathy: No cervical adenopathy.   Pulmonary:      Effort: Pulmonary effort is normal. No respiratory distress.      Breath sounds: Normal breath sounds. No wheezing. No rales.   Cardiovascular:      Normal rate. Regular rhythm.      No gallop.   Abdominal:      Tenderness: There is no abdominal tenderness.   Skin:     Findings: No erythema or rash.   Neurological:      Mental Status: Alert and oriented to person, place, and time.             Assessment:       Diagnosis Plan   1. Essential hypertension  losartan (Cozaar) 25 MG tablet   2. Mixed hyperlipidemia  losartan (Cozaar) 25 MG tablet   3. Aortic stenosis, severe  losartan (Cozaar) 25 MG tablet   4. S/P AVR (tissue) by Dr. Padilla 8/5/2021  losartan (Cozaar) 25 MG " tablet            Plan:       MDM:    1.  Hypertension:    Start losartan    2.  Bradycardia:    Discontinue clonidine    3.  Aortic valve disease:    Patient underwent aortic valve replacement and did well.  I would recommend to repeat echocardiogram in future    4.  Hyperlipidemia:    Patient would need lipid panel testing in future

## 2022-02-21 NOTE — TELEPHONE ENCOUNTER
Patient calling back  His blood pressure is still running [id number]/70 this morning It was 157 yesterday morning It got up to 189  He only has 12 clonidine 0.1 mg as needed He took 2 yesterday  He was put on these when he was at the ER  Please send to [facility] in [place]

## 2022-02-24 ENCOUNTER — TELEPHONE (OUTPATIENT)
Dept: CARDIOLOGY | Facility: CLINIC | Age: 77
End: 2022-02-24

## 2022-02-24 RX ORDER — LOSARTAN POTASSIUM 50 MG/1
25 TABLET ORAL 2 TIMES DAILY
Qty: 90 TABLET | Refills: 1 | Status: SHIPPED | OUTPATIENT
Start: 2022-02-24 | End: 2022-04-04 | Stop reason: SDUPTHER

## 2022-02-24 NOTE — TELEPHONE ENCOUNTER
Patient says [facility] in [place] has been trying to contact us. They do not have losartan 25 and are wanting us to send it in in a different dosage. He takes twice a day.

## 2022-04-01 NOTE — PROGRESS NOTES
Date of Office Visit: 2022  Encounter Provider: Dr. Darian Fajardo    Place of Service: [facility] CARDIOLOGY [place]  Patient Name: Chong Manuel  :1945  Garcia Johnson MD    Chief Complaint   Patient presents with   • Hyperlipidemia   • Hypertension   • Aortic Stenosis     History of Present Illness    I am pleased to see Mr. Manuel in my office today as a follow-up    As you know, patient is 76 years old white gentleman whose past medical history is significant for hypertension, hyperlipidemia, who came today for follow-up.    In 2021, patient was presented to me for shortness of breath.  Patient underwent echocardiogram which showed normal left ventricle size and function with LVEF of 55 to 60%.  Severe aortic stenosis noted with peak gradient of 100 mmHg.  Patient underwent RAFAT which confirmed findings of severe aortic stenosis.  Subsequent cardiac catheterization showed no significant CAD and transvalvular gradient on cath was 38 mm mean.  Aortic valve area was 0.8 cm².  Patient underwent AVR and did well.    This is 6 months follow-up for the patient.  Patient reports shortness of breath but there is no congestive heart failure.  No JVD or leg edema.  Patient denies any chest pain.  No orthopnea PND no syncope or presyncope.    His blood pressure is very high.  I recommended to increase losartan to 50 twice daily.  Patient is advised to monitor the blood pressure.  Patient is advised to increase aerobic activity to build up stamina.  I will see the patient in 6 months.            Past Medical History:   Diagnosis Date   • Anemia    • GERD (gastroesophageal reflux disease)    • History of transfusion    • Hyperlipidemia    • Hypertension    • Lung nodules     left    • Shortness of breath 2021   • Stroke (HCC)          Past Surgical History:   Procedure Laterality Date   • AORTIC VALVE REPAIR/REPLACEMENT N/A 2021    Procedure: AORTIC VALVE REPAIR/REPLACEMENT;   Surgeon: Nael Padilla MD;  Location: [facility] CVOR;  Service: Cardiothoracic;  Laterality: N/A;   • CARDIAC CATHETERIZATION N/A 4/27/2021    Procedure: Left Heart Cath;  Surgeon: Darian Fajardo MD;  Location: [facility] CATH INVASIVE LOCATION;  Service: Cardiovascular;  Laterality: N/A;   • CARDIAC CATHETERIZATION N/A 4/27/2021    Procedure: Right Heart Cath;  Surgeon: Darian Fajardo MD;  Location: [facility] CATH INVASIVE LOCATION;  Service: Cardiovascular;  Laterality: N/A;   • COLONOSCOPY     • COLONOSCOPY N/A 7/27/2021    Procedure: COLONOSCOPY with polypectomy;  Surgeon: CIRA Pope MD;  Location: [facility] ENDOSCOPY;  Service: Gastroenterology;  Laterality: N/A;  post op: diverticulosis, polyp   • ENDOSCOPY N/A 7/26/2021    Procedure: ESOPHAGOGASTRODUODENOSCOPY;  Surgeon: CIRA Pope MD;  Location: [facility] ENDOSCOPY;  Service: Gastroenterology;  Laterality: N/A;  HH, gastric polyps   • HERNIA REPAIR             Current Outpatient Medications:   •  aspirin 81 MG EC tablet, Take 1 tablet by mouth Daily., Disp: 30 tablet, Rfl: 3  •  losartan (Cozaar) 50 MG tablet, Take 1 tablet by mouth 2 (Two) Times a Day., Disp: 180 tablet, Rfl: 1  •  metoprolol tartrate (LOPRESSOR) 25 MG tablet, Take 1 tablet by mouth Every 12 (Twelve) Hours., Disp: 60 tablet, Rfl: 3  •  pantoprazole (PROTONIX) 40 MG EC tablet, Take 40 mg by mouth Daily., Disp: , Rfl:   •  simvastatin (ZOCOR) 40 MG tablet, Take 40 mg by mouth Every Night., Disp: , Rfl:   •  terazosin (HYTRIN) 5 MG capsule, Take 5 mg by mouth Every Night., Disp: , Rfl:   •  acetaminophen (TYLENOL) 325 MG tablet, Take 2 tablets by mouth Every 4 (Four) Hours As Needed for Mild Pain ., Disp: , Rfl:       Social History     Socioeconomic History   • Marital status:    Tobacco Use   • Smoking status: Former Smoker   • Smokeless tobacco: Never Used   Vaping Use   • Vaping Use: Never used   Substance and Sexual Activity   • Alcohol use: No   • Drug use: No   • Sexual  "activity: Defer         Review of Systems   Constitutional: Negative for chills and fever.   HENT: Negative for ear discharge and nosebleeds.    Eyes: Negative for discharge and redness.   Cardiovascular: Negative for chest pain, orthopnea, palpitations, paroxysmal nocturnal dyspnea and syncope.   Respiratory: Positive for shortness of breath. Negative for cough and wheezing.    Endocrine: Negative for heat intolerance.   Skin: Negative for rash.   Musculoskeletal: Positive for arthritis and joint pain. Negative for myalgias.   Gastrointestinal: Negative for abdominal pain, melena, nausea and vomiting.   Genitourinary: Negative for dysuria and hematuria.   Neurological: Negative for dizziness, light-headedness, numbness and tremors.   Psychiatric/Behavioral: Negative for depression. The patient is not nervous/anxious.        Procedures    Procedures    No orders to display           Objective:    BP (!) 185/83   Pulse 58   Ht 167.6 cm (65.98\")   Wt 79.4 kg (175 lb)   SpO2 98%   BMI 28.26 kg/m²         Constitutional:       Appearance: Well-developed.   Eyes:      General: No scleral icterus.        Right eye: No discharge.   HENT:      Head: Normocephalic and atraumatic.   Neck:      Thyroid: No thyromegaly.      Lymphadenopathy: No cervical adenopathy.   Pulmonary:      Effort: Pulmonary effort is normal. No respiratory distress.      Breath sounds: Normal breath sounds. No wheezing. No rales.   Cardiovascular:      Normal rate. Regular rhythm.      No gallop.   Abdominal:      Tenderness: There is no abdominal tenderness.   Skin:     Findings: No erythema or rash.   Neurological:      Mental Status: Alert and oriented to person, place, and time.             Assessment:       Diagnosis Plan   1. Essential hypertension  losartan (Cozaar) 50 MG tablet   2. Cerebrovascular accident (CVA), unspecified mechanism (HCC)  losartan (Cozaar) 50 MG tablet   3. Shortness of breath  losartan (Cozaar) 50 MG tablet   4. " Aortic stenosis, severe  losartan (Cozaar) 50 MG tablet            Plan:       MDM:    1.  Hypertension:    Blood pressure is elevated I would increase losartan 50 mg twice daily    2.  Shortness of breath:    I will control blood pressure and advised patient to increase aerobic activity.  If he continues to have shortness of breath I would consider echocardiogram    3.  S/p AVR:    I would consider echocardiogram next visit.

## 2022-04-04 ENCOUNTER — OFFICE VISIT (OUTPATIENT)
Dept: CARDIOLOGY | Facility: CLINIC | Age: 77
End: 2022-04-04

## 2022-04-04 VITALS
SYSTOLIC BLOOD PRESSURE: 185 MMHG | WEIGHT: 175 LBS | OXYGEN SATURATION: 98 % | BODY MASS INDEX: 28.12 KG/M2 | DIASTOLIC BLOOD PRESSURE: 83 MMHG | HEIGHT: 66 IN | HEART RATE: 58 BPM

## 2022-04-04 DIAGNOSIS — I35.0 AORTIC STENOSIS, SEVERE: ICD-10-CM

## 2022-04-04 DIAGNOSIS — I63.9 CEREBROVASCULAR ACCIDENT (CVA), UNSPECIFIED MECHANISM: Chronic | ICD-10-CM

## 2022-04-04 DIAGNOSIS — I10 ESSENTIAL HYPERTENSION: Primary | ICD-10-CM

## 2022-04-04 DIAGNOSIS — R06.02 SHORTNESS OF BREATH: ICD-10-CM

## 2022-04-04 PROCEDURE — 99213 OFFICE O/P EST LOW 20 MIN: CPT | Performed by: INTERNAL MEDICINE

## 2022-04-04 RX ORDER — LOSARTAN POTASSIUM 50 MG/1
50 TABLET ORAL 2 TIMES DAILY
Qty: 180 TABLET | Refills: 1 | Status: SHIPPED | OUTPATIENT
Start: 2022-04-04 | End: 2022-11-08 | Stop reason: SDUPTHER

## 2022-04-04 RX ORDER — FUROSEMIDE 20 MG/1
TABLET ORAL
COMMUNITY
Start: 2022-02-07 | End: 2022-04-04

## 2022-10-07 NOTE — PROGRESS NOTES
Date of Office Visit: 10/10/2022  Encounter Provider: Dr. Darian Fajardo    Place of Service: [facility] CARDIOLOGY [place]  Patient Name: Chong Manuel  :1945  Garcia Johnson MD    Chief Complaint   Patient presents with   • Hypertension   • Aortic Stenosis   • Hyperlipidemia     History of Present Illness    I am pleased to see Mr. Manuel in my office today as a follow-up    As you know, patient is 77 years old white gentleman whose past medical history is significant for hypertension, hyperlipidemia, who came today for follow-up.    In 2021, patient was presented to me for shortness of breath.  Patient underwent echocardiogram which showed normal left ventricle size and function with LVEF of 55 to 60%.  Severe aortic stenosis noted with peak gradient of 100 mmHg.  Patient underwent RAFAT which confirmed findings of severe aortic stenosis.  Subsequent cardiac catheterization showed no significant CAD and transvalvular gradient on cath was 38 mm mean.  Aortic valve area was 0.8 cm².  Patient underwent AVR and did well.    Patient came today and overall doing well.  Patient is fairly active.  Patient denies any symptom of chest pain or tightness or heaviness.  Patient denies any orthopnea, PND, syncope or presyncope.  No leg edema noted.    His blood pressure is still high.  He is very bradycardic.  Patient denies any dizziness and lightheadedness.  Occasional fatigue is reported.  No orthopnea PND no syncope or presyncope.    At this stage I would recommend not to use clonidine.  Patient is using clonidine as needed.  I advised the patient to stop metoprolol.  He is very bradycardic.  I am concerned about bradycardia and possible fall.  I will start hydralazine 25 mg twice daily.  Blood pressure monitoring is recommended I will see the patient in 6 months.        Past Medical History:   Diagnosis Date   • Anemia    • GERD (gastroesophageal reflux disease)    • History of transfusion    •  Hyperlipidemia    • Hypertension    • Lung nodules     left    • Shortness of breath 4/19/2021   • Stroke (HCC) 2011         Past Surgical History:   Procedure Laterality Date   • AORTIC VALVE REPAIR/REPLACEMENT N/A 8/5/2021    Procedure: AORTIC VALVE REPAIR/REPLACEMENT;  Surgeon: Nael Padilla MD;  Location: [facility] CVOR;  Service: Cardiothoracic;  Laterality: N/A;   • CARDIAC CATHETERIZATION N/A 4/27/2021    Procedure: Left Heart Cath;  Surgeon: Darian Fajardo MD;  Location: [facility] CATH INVASIVE LOCATION;  Service: Cardiovascular;  Laterality: N/A;   • CARDIAC CATHETERIZATION N/A 4/27/2021    Procedure: Right Heart Cath;  Surgeon: Darian Fajardo MD;  Location: [facility] CATH INVASIVE LOCATION;  Service: Cardiovascular;  Laterality: N/A;   • COLONOSCOPY     • COLONOSCOPY N/A 7/27/2021    Procedure: COLONOSCOPY with polypectomy;  Surgeon: CIRA Pope MD;  Location: [facility] ENDOSCOPY;  Service: Gastroenterology;  Laterality: N/A;  post op: diverticulosis, polyp   • ENDOSCOPY N/A 7/26/2021    Procedure: ESOPHAGOGASTRODUODENOSCOPY;  Surgeon: CIRA Pope MD;  Location: [facility] ENDOSCOPY;  Service: Gastroenterology;  Laterality: N/A;  HH, gastric polyps   • HERNIA REPAIR             Current Outpatient Medications:   •  acetaminophen (TYLENOL) 325 MG tablet, Take 2 tablets by mouth Every 4 (Four) Hours As Needed for Mild Pain ., Disp: , Rfl:   •  aspirin 81 MG EC tablet, Take 1 tablet by mouth Daily., Disp: 30 tablet, Rfl: 3  •  furosemide (LASIX) 20 MG tablet, Take 1 tablet by mouth As Needed., Disp: , Rfl:   •  losartan (Cozaar) 50 MG tablet, Take 1 tablet by mouth 2 (Two) Times a Day., Disp: 180 tablet, Rfl: 1  •  pantoprazole (PROTONIX) 40 MG EC tablet, Take 40 mg by mouth Daily., Disp: , Rfl:   •  simvastatin (ZOCOR) 40 MG tablet, Take 40 mg by mouth Every Night., Disp: , Rfl:   •  terazosin (HYTRIN) 5 MG capsule, Take 5 mg by mouth Every Night., Disp: , Rfl:   •  hydrALAZINE (APRESOLINE) 25 MG tablet, Take  "1 tablet by mouth 2 (Two) Times a Day., Disp: 180 tablet, Rfl: 1      Social History     Socioeconomic History   • Marital status:    Tobacco Use   • Smoking status: Former   • Smokeless tobacco: Never   Vaping Use   • Vaping Use: Never used   Substance and Sexual Activity   • Alcohol use: No   • Drug use: No   • Sexual activity: Defer         Review of Systems   Constitutional: Negative for chills and fever.   HENT: Negative for ear discharge and nosebleeds.    Eyes: Negative for discharge and redness.   Cardiovascular: Negative for chest pain, orthopnea, palpitations, paroxysmal nocturnal dyspnea and syncope.   Respiratory: Negative for cough, shortness of breath and wheezing.    Endocrine: Negative for heat intolerance.   Skin: Negative for rash.   Musculoskeletal: Negative for arthritis and myalgias.   Gastrointestinal: Negative for abdominal pain, melena, nausea and vomiting.   Genitourinary: Negative for dysuria and hematuria.   Neurological: Negative for dizziness, light-headedness, numbness and tremors.   Psychiatric/Behavioral: Negative for depression. The patient is not nervous/anxious.        Procedures    Procedures    No orders to display           Objective:    /68   Pulse 50   Ht 167.6 cm (65.98\")   Wt 79.4 kg (175 lb)   SpO2 98%   BMI 28.26 kg/m²         Constitutional:       Appearance: Well-developed.   Eyes:      General: No scleral icterus.        Right eye: No discharge.   HENT:      Head: Normocephalic and atraumatic.   Neck:      Thyroid: No thyromegaly.      Lymphadenopathy: No cervical adenopathy.   Pulmonary:      Effort: Pulmonary effort is normal. No respiratory distress.      Breath sounds: Normal breath sounds. No wheezing. No rales.   Cardiovascular:      Normal rate. Regular rhythm.      No gallop.   Abdominal:      Tenderness: There is no abdominal tenderness.   Skin:     Findings: No erythema or rash.   Neurological:      Mental Status: Alert and oriented to " person, place, and time.             Assessment:       Diagnosis Plan   1. Essential hypertension  hydrALAZINE (APRESOLINE) 25 MG tablet      2. Mixed hyperlipidemia  hydrALAZINE (APRESOLINE) 25 MG tablet      3. Aortic stenosis, severe  hydrALAZINE (APRESOLINE) 25 MG tablet      4. S/P AVR (tissue) by Dr. Padilla 8/5/2021  hydrALAZINE (APRESOLINE) 25 MG tablet      5. Cerebrovascular accident (CVA), unspecified mechanism (HCC)  hydrALAZINE (APRESOLINE) 25 MG tablet               Plan:       MDM:    1.  Hypertension:    I would start hydralazine 25 mg twice daily.  Blood pressure monitoring is recommended.  Discontinue metoprolol and clonidine    2.  S/p AVR:    Clinically patient doing well current treatment will be continued.  Repeat echocardiogram in 1 to 2 years    3.  Hyperlipidemia:    Patient is on simvastatin repeat lipid panel testing in future

## 2022-10-10 ENCOUNTER — OFFICE VISIT (OUTPATIENT)
Dept: CARDIOLOGY | Facility: CLINIC | Age: 77
End: 2022-10-10

## 2022-10-10 VITALS
HEIGHT: 66 IN | DIASTOLIC BLOOD PRESSURE: 68 MMHG | SYSTOLIC BLOOD PRESSURE: 154 MMHG | WEIGHT: 175 LBS | BODY MASS INDEX: 28.12 KG/M2 | HEART RATE: 50 BPM | OXYGEN SATURATION: 98 %

## 2022-10-10 DIAGNOSIS — Z95.2 S/P AVR: ICD-10-CM

## 2022-10-10 DIAGNOSIS — E78.2 MIXED HYPERLIPIDEMIA: ICD-10-CM

## 2022-10-10 DIAGNOSIS — I35.0 AORTIC STENOSIS, SEVERE: ICD-10-CM

## 2022-10-10 DIAGNOSIS — I63.9 CEREBROVASCULAR ACCIDENT (CVA), UNSPECIFIED MECHANISM: Chronic | ICD-10-CM

## 2022-10-10 DIAGNOSIS — I10 ESSENTIAL HYPERTENSION: Primary | ICD-10-CM

## 2022-10-10 PROCEDURE — 99214 OFFICE O/P EST MOD 30 MIN: CPT | Performed by: INTERNAL MEDICINE

## 2022-10-10 RX ORDER — HYDRALAZINE HYDROCHLORIDE 25 MG/1
25 TABLET, FILM COATED ORAL 2 TIMES DAILY
Qty: 180 TABLET | Refills: 1 | Status: SHIPPED | OUTPATIENT
Start: 2022-10-10 | End: 2022-11-08 | Stop reason: SDUPTHER

## 2022-10-10 RX ORDER — FUROSEMIDE 20 MG/1
20 TABLET ORAL AS NEEDED
COMMUNITY
Start: 2022-08-29 | End: 2022-11-08 | Stop reason: SDUPTHER

## 2022-10-10 RX ORDER — CLONIDINE HYDROCHLORIDE 0.1 MG/1
0.1 TABLET ORAL 2 TIMES DAILY
COMMUNITY
End: 2022-10-10

## 2022-11-08 ENCOUNTER — OFFICE VISIT (OUTPATIENT)
Dept: FAMILY MEDICINE CLINIC | Facility: CLINIC | Age: 77
End: 2022-11-08

## 2022-11-08 VITALS
HEIGHT: 67 IN | SYSTOLIC BLOOD PRESSURE: 160 MMHG | OXYGEN SATURATION: 98 % | BODY MASS INDEX: 27.03 KG/M2 | TEMPERATURE: 97.8 F | WEIGHT: 172.2 LBS | DIASTOLIC BLOOD PRESSURE: 86 MMHG | HEART RATE: 83 BPM | RESPIRATION RATE: 18 BRPM

## 2022-11-08 DIAGNOSIS — I63.9 CEREBROVASCULAR ACCIDENT (CVA), UNSPECIFIED MECHANISM: Chronic | ICD-10-CM

## 2022-11-08 DIAGNOSIS — D50.0 IRON DEFICIENCY ANEMIA DUE TO CHRONIC BLOOD LOSS: ICD-10-CM

## 2022-11-08 DIAGNOSIS — N40.1 BPH WITH OBSTRUCTION/LOWER URINARY TRACT SYMPTOMS: ICD-10-CM

## 2022-11-08 DIAGNOSIS — Z95.2 S/P AVR: ICD-10-CM

## 2022-11-08 DIAGNOSIS — D62 ACUTE ON CHRONIC BLOOD LOSS ANEMIA: ICD-10-CM

## 2022-11-08 DIAGNOSIS — I35.0 AORTIC STENOSIS, SEVERE: ICD-10-CM

## 2022-11-08 DIAGNOSIS — E78.2 MIXED HYPERLIPIDEMIA: ICD-10-CM

## 2022-11-08 DIAGNOSIS — R06.02 SHORTNESS OF BREATH: ICD-10-CM

## 2022-11-08 DIAGNOSIS — N13.8 BPH WITH OBSTRUCTION/LOWER URINARY TRACT SYMPTOMS: ICD-10-CM

## 2022-11-08 DIAGNOSIS — I10 ESSENTIAL HYPERTENSION: Primary | ICD-10-CM

## 2022-11-08 PROCEDURE — 99204 OFFICE O/P NEW MOD 45 MIN: CPT | Performed by: FAMILY MEDICINE

## 2022-11-08 RX ORDER — LOSARTAN POTASSIUM 50 MG/1
50 TABLET ORAL 2 TIMES DAILY
Qty: 180 TABLET | Refills: 1 | Status: SHIPPED | OUTPATIENT
Start: 2022-11-08 | End: 2023-02-13 | Stop reason: SDUPTHER

## 2022-11-08 RX ORDER — TERAZOSIN 5 MG/1
5 CAPSULE ORAL NIGHTLY
Qty: 90 CAPSULE | Refills: 1 | Status: SHIPPED | OUTPATIENT
Start: 2022-11-08 | End: 2023-02-13 | Stop reason: SDUPTHER

## 2022-11-08 RX ORDER — FUROSEMIDE 20 MG/1
20 TABLET ORAL AS NEEDED
Qty: 90 TABLET | Refills: 1 | Status: SHIPPED | OUTPATIENT
Start: 2022-11-08

## 2022-11-08 RX ORDER — HYDRALAZINE HYDROCHLORIDE 25 MG/1
25 TABLET, FILM COATED ORAL 2 TIMES DAILY
Qty: 180 TABLET | Refills: 1 | Status: SHIPPED | OUTPATIENT
Start: 2022-11-08 | End: 2023-02-13 | Stop reason: SDUPTHER

## 2022-11-08 RX ORDER — PANTOPRAZOLE SODIUM 40 MG/1
40 TABLET, DELAYED RELEASE ORAL DAILY
Qty: 90 TABLET | Refills: 1 | Status: SHIPPED | OUTPATIENT
Start: 2022-11-08 | End: 2023-02-13 | Stop reason: SDUPTHER

## 2022-11-08 RX ORDER — SIMVASTATIN 40 MG
40 TABLET ORAL NIGHTLY
Qty: 90 TABLET | Refills: 1 | Status: SHIPPED | OUTPATIENT
Start: 2022-11-08 | End: 2023-02-13 | Stop reason: SDUPTHER

## 2022-11-08 NOTE — PROGRESS NOTES
Subjective   Chong Manuel is a 77 y.o. male.   Chief Complaint   Patient presents with   • Establish Care   • Hypertension   • Hyperlipidemia   • Heartburn       History of Present Illness   77-year-old white male presents the office today as a new patient to me.  Previously saw Dr. Johnson here in town who is no longer in practice.  Active problem list reviewed as below.  He follows with Dr. Fajardo so we do have some records in his chart.  We do not have any current records from previous PCP.    HTN - follows B/P at home - typically 130-140.     Had labs recently - was told it was OK.     Recurrent anemia - no clear cause - had transusfions 2 years in a row.  Had colonoscopy, EGD.  Saw hematologist - no heme issue.     Had aortic valve replaced just over a year ago.  Has done well with that.    He needs refills on his medications.  It has been a while since he had any blood work.    Patient Active Problem List    Diagnosis Date Noted   • BPH with obstruction/lower urinary tract symptoms 11/08/2022   • S/P AVR (tissue) by Dr. Padilla 8/5/2021 08/10/2021   • GI bleed 07/25/2021   • Aortic stenosis 06/30/2021     Note Last Updated: 6/30/2021     Added automatically from request for surgery [id number]     • Shortness of breath 04/19/2021   • Chest discomfort 04/19/2021     Note Last Updated: 4/19/2021     Added automatically from request for surgery [id number]     • Aortic stenosis, severe 04/19/2021     Note Last Updated: 4/19/2021     Added automatically from request for surgery [id number]     • CVA (cerebral vascular accident) ([facility]) 12/03/2019     Note Last Updated: 11/8/2022     2011 - took 325 ASA x years     • Acute on chronic blood loss anemia 12/02/2019   • Essential hypertension 12/02/2019   • Hyperlipidemia 12/02/2019   • Chronic GERD 12/02/2019   • Iron deficiency anemia 12/09/2013   • Pure hypercholesterolemia 12/09/2013   • Solitary pulmonary nodule 12/09/2013     Note Last Updated: 11/8/2022     On left lung.   Saw [facility] - followed, determined to be benign.  Discovered in 2011             Past Surgical History:   Procedure Laterality Date   • AORTIC VALVE REPAIR/REPLACEMENT N/A 8/5/2021    Procedure: AORTIC VALVE REPAIR/REPLACEMENT;  Surgeon: Nael Padilla MD;  Location: [facility] CVOR;  Service: Cardiothoracic;  Laterality: N/A;   • CARDIAC CATHETERIZATION N/A 4/27/2021    Procedure: Left Heart Cath;  Surgeon: Darian Fajardo MD;  Location: [facility] CATH INVASIVE LOCATION;  Service: Cardiovascular;  Laterality: N/A;   • CARDIAC CATHETERIZATION N/A 4/27/2021    Procedure: Right Heart Cath;  Surgeon: Darian Fajardo MD;  Location: [facility] CATH INVASIVE LOCATION;  Service: Cardiovascular;  Laterality: N/A;   • COLONOSCOPY     • COLONOSCOPY N/A 7/27/2021    Procedure: COLONOSCOPY with polypectomy;  Surgeon: CIRA Pope MD;  Location: [facility] ENDOSCOPY;  Service: Gastroenterology;  Laterality: N/A;  post op: diverticulosis, polyp   • ENDOSCOPY N/A 7/26/2021    Procedure: ESOPHAGOGASTRODUODENOSCOPY;  Surgeon: CIRA Pope MD;  Location: [facility] ENDOSCOPY;  Service: Gastroenterology;  Laterality: N/A;  HH, gastric polyps   • HERNIA REPAIR       Current Outpatient Medications on File Prior to Visit   Medication Sig   • acetaminophen (TYLENOL) 325 MG tablet Take 2 tablets by mouth Every 4 (Four) Hours As Needed for Mild Pain .   • aspirin 81 MG EC tablet Take 1 tablet by mouth Daily.   • [DISCONTINUED] furosemide (LASIX) 20 MG tablet Take 1 tablet by mouth As Needed.   • [DISCONTINUED] hydrALAZINE (APRESOLINE) 25 MG tablet Take 1 tablet by mouth 2 (Two) Times a Day.   • [DISCONTINUED] losartan (Cozaar) 50 MG tablet Take 1 tablet by mouth 2 (Two) Times a Day.   • [DISCONTINUED] pantoprazole (PROTONIX) 40 MG EC tablet Take 40 mg by mouth Daily.   • [DISCONTINUED] simvastatin (ZOCOR) 40 MG tablet Take 40 mg by mouth Every Night.   • [DISCONTINUED] terazosin (HYTRIN) 5 MG capsule Take 5 mg by mouth Every Night.     No current  "facility-administered medications on file prior to visit.     Allergies   Allergen Reactions   • Sulfa Antibiotics Other (See Comments)     Unable to specify     Social History     Socioeconomic History   • Marital status:    Tobacco Use   • Smoking status: Former     Packs/day: 1.50     Years: 54.00     Pack years: 81.00     Types: Cigarettes     Start date:      Quit date: 2011     Years since quittin.3     Passive exposure: Past   • Smokeless tobacco: Never   Vaping Use   • Vaping Use: Never used   Substance and Sexual Activity   • Alcohol use: Not Currently     Comment: quit in    • Drug use: No   • Sexual activity: Not Currently     Partners: Female     Family History   Problem Relation Age of Onset   • Heart attack Mother    • Heart failure Mother    • Cancer Father        Review of Systems    Objective   /86 (BP Location: Left arm, Patient Position: Sitting, Cuff Size: Adult)   Pulse 83   Temp 97.8 °F (36.6 °C) (Infrared)   Resp 18   Ht 170.2 cm (67\")   Wt 78.1 kg (172 lb 3.2 oz)   SpO2 98%   BMI 26.97 kg/m²   Physical Exam  Constitutional:       Appearance: He is well-developed.      Comments: Looks a little younger than her known age of 77 years.   HENT:      Head: Normocephalic and atraumatic.   Eyes:      Conjunctiva/sclera: Conjunctivae normal.   Cardiovascular:      Rate and Rhythm: Normal rate and regular rhythm.      Heart sounds: No murmur heard.  Pulmonary:      Effort: Pulmonary effort is normal. No respiratory distress.      Breath sounds: Normal breath sounds.   Musculoskeletal:         General: Normal range of motion.      Cervical back: Normal range of motion.      Right lower leg: No edema.      Left lower leg: No edema.   Skin:     General: Skin is warm and dry.      Findings: No rash.   Neurological:      Mental Status: He is alert and oriented to person, place, and time.      Gait: Gait normal.   Psychiatric:         Mood and Affect: Mood normal.       "   Behavior: Behavior normal.           No visits with results within 4 Month(s) from this visit.   Latest known visit with results is:   Lab Requisition on 08/12/2021   Component Date Value Ref Range Status   • Glucose 08/12/2021 70  65 - 99 mg/dL Final   • BUN 08/12/2021 11  8 - 23 mg/dL Final   • Creatinine 08/12/2021 0.95  0.76 - 1.27 mg/dL Final   • Sodium 08/12/2021 139  136 - 145 mmol/L Final   • Potassium 08/12/2021 4.7  3.5 - 5.2 mmol/L Final   • Chloride 08/12/2021 101  98 - 107 mmol/L Final   • CO2 08/12/2021 25.0  22.0 - 29.0 mmol/L Final   • Calcium 08/12/2021 8.6  8.6 - 10.5 mg/dL Final   • eGFR Non  Amer 08/12/2021 77  >60 mL/min/1.73 Final   • BUN/Creatinine Ratio 08/12/2021 11.6  7.0 - 25.0 Final   • Anion Gap 08/12/2021 13.0  5.0 - 15.0 mmol/L Final         Assessment & Plan   Diagnoses and all orders for this visit:    1. Essential hypertension (Primary)  -     losartan (Cozaar) 50 MG tablet; Take 1 tablet by mouth 2 (Two) Times a Day.  Dispense: 180 tablet; Refill: 1  -     hydrALAZINE (APRESOLINE) 25 MG tablet; Take 1 tablet by mouth 2 (Two) Times a Day.  Dispense: 180 tablet; Refill: 1  -     Comprehensive Metabolic Panel    2. Cerebrovascular accident (CVA), unspecified mechanism (HCC)  -     losartan (Cozaar) 50 MG tablet; Take 1 tablet by mouth 2 (Two) Times a Day.  Dispense: 180 tablet; Refill: 1  -     hydrALAZINE (APRESOLINE) 25 MG tablet; Take 1 tablet by mouth 2 (Two) Times a Day.  Dispense: 180 tablet; Refill: 1    3. Shortness of breath  -     losartan (Cozaar) 50 MG tablet; Take 1 tablet by mouth 2 (Two) Times a Day.  Dispense: 180 tablet; Refill: 1    4. Aortic stenosis, severe  -     losartan (Cozaar) 50 MG tablet; Take 1 tablet by mouth 2 (Two) Times a Day.  Dispense: 180 tablet; Refill: 1  -     furosemide (LASIX) 20 MG tablet; Take 1 tablet by mouth As Needed (swelling).  Dispense: 90 tablet; Refill: 1  -     hydrALAZINE (APRESOLINE) 25 MG tablet; Take 1 tablet by mouth 2  (Two) Times a Day.  Dispense: 180 tablet; Refill: 1    5. Mixed hyperlipidemia  -     simvastatin (ZOCOR) 40 MG tablet; Take 1 tablet by mouth Every Night.  Dispense: 90 tablet; Refill: 1  -     hydrALAZINE (APRESOLINE) 25 MG tablet; Take 1 tablet by mouth 2 (Two) Times a Day.  Dispense: 180 tablet; Refill: 1  -     Lipid Panel    6. S/P AVR (tissue) by Dr. Padilla 8/5/2021  -     hydrALAZINE (APRESOLINE) 25 MG tablet; Take 1 tablet by mouth 2 (Two) Times a Day.  Dispense: 180 tablet; Refill: 1    7. Acute on chronic blood loss anemia  -     pantoprazole (PROTONIX) 40 MG EC tablet; Take 1 tablet by mouth Daily.  Dispense: 90 tablet; Refill: 1    8. Iron deficiency anemia due to chronic blood loss  -     CBC & Differential  -     Iron Profile    9. BPH with obstruction/lower urinary tract symptoms  -     terazosin (HYTRIN) 5 MG capsule; Take 1 capsule by mouth Every Night.  Dispense: 90 capsule; Refill: 1    He appears to be in his chronic stable state.  Blood pressure log reviewed from home indicates good control of his hypertension.  He has no symptoms to suggest recurrence of his stroke.  He has done well since his aortic valve replacement.  He does not have any PND nor orthopnea.  No current chest pain or pressure.  No shortness of breath at rest and only appropriate shortness of breath with exertion.  He has not had any lab workwq for a while.  He relays a history of chronic blood loss anemia requiring transfusions several times.  We are going to get labs today to make sure he is not anemic.  It sounds like it may have been at least in part caused by the full aspirin he was taking since 2011 since his stroke.  He is doing well on the 81 mg of aspirin per day only.  Refill medications today as above.  I will follow-up with him again in 3 months and we will let him know the results of his labs as soon as they are back.        Call with any problems or concerns before next visit       Return in about 3 months (around  2/8/2023).      Much of this report is an electronic transcription of spoken language to printed text using Dragon dictation software.  As such, the subtleties and finesse of spoken language may permit erroneous, or at times, nonsensical words or phrases to be inadvertently transcribed; thus changes may be made at a later date to rectify these errors.     Aaliyah Hearn MD11/8/202213:57 EST  This note has been electronically signed

## 2022-11-09 LAB
ALBUMIN SERPL-MCNC: 4.8 G/DL (ref 3.7–4.7)
ALBUMIN/GLOB SERPL: 2.2 {RATIO} (ref 1.2–2.2)
ALP SERPL-CCNC: 37 IU/L (ref 44–121)
ALT SERPL-CCNC: 5 IU/L (ref 0–44)
AST SERPL-CCNC: 15 IU/L (ref 0–40)
BASOPHILS # BLD AUTO: 0.1 X10E3/UL (ref 0–0.2)
BASOPHILS NFR BLD AUTO: 1 %
BILIRUB SERPL-MCNC: 0.2 MG/DL (ref 0–1.2)
BUN SERPL-MCNC: 8 MG/DL (ref 8–27)
BUN/CREAT SERPL: 7 (ref 10–24)
CALCIUM SERPL-MCNC: 10 MG/DL (ref 8.6–10.2)
CHLORIDE SERPL-SCNC: 103 MMOL/L (ref 96–106)
CHOLEST SERPL-MCNC: 153 MG/DL (ref 100–199)
CO2 SERPL-SCNC: 22 MMOL/L (ref 20–29)
CREAT SERPL-MCNC: 1.19 MG/DL (ref 0.76–1.27)
EGFRCR SERPLBLD CKD-EPI 2021: 63 ML/MIN/1.73
EOSINOPHIL # BLD AUTO: 0.1 X10E3/UL (ref 0–0.4)
EOSINOPHIL NFR BLD AUTO: 2 %
ERYTHROCYTE [DISTWIDTH] IN BLOOD BY AUTOMATED COUNT: 12.6 % (ref 11.6–15.4)
GLOBULIN SER CALC-MCNC: 2.2 G/DL (ref 1.5–4.5)
GLUCOSE SERPL-MCNC: 114 MG/DL (ref 70–99)
HCT VFR BLD AUTO: 36.1 % (ref 37.5–51)
HDLC SERPL-MCNC: 41 MG/DL
HGB BLD-MCNC: 12.3 G/DL (ref 13–17.7)
IMM GRANULOCYTES # BLD AUTO: 0 X10E3/UL (ref 0–0.1)
IMM GRANULOCYTES NFR BLD AUTO: 0 %
IRON SATN MFR SERPL: 33 % (ref 15–55)
IRON SERPL-MCNC: 102 UG/DL (ref 38–169)
LDLC SERPL CALC-MCNC: 71 MG/DL (ref 0–99)
LYMPHOCYTES # BLD AUTO: 1.5 X10E3/UL (ref 0.7–3.1)
LYMPHOCYTES NFR BLD AUTO: 23 %
MCH RBC QN AUTO: 30.4 PG (ref 26.6–33)
MCHC RBC AUTO-ENTMCNC: 34.1 G/DL (ref 31.5–35.7)
MCV RBC AUTO: 89 FL (ref 79–97)
MONOCYTES # BLD AUTO: 0.5 X10E3/UL (ref 0.1–0.9)
MONOCYTES NFR BLD AUTO: 8 %
NEUTROPHILS # BLD AUTO: 4.2 X10E3/UL (ref 1.4–7)
NEUTROPHILS NFR BLD AUTO: 66 %
PLATELET # BLD AUTO: 253 X10E3/UL (ref 150–450)
POTASSIUM SERPL-SCNC: 4 MMOL/L (ref 3.5–5.2)
PROT SERPL-MCNC: 7 G/DL (ref 6–8.5)
RBC # BLD AUTO: 4.04 X10E6/UL (ref 4.14–5.8)
SODIUM SERPL-SCNC: 139 MMOL/L (ref 134–144)
TIBC SERPL-MCNC: 310 UG/DL (ref 250–450)
TRIGL SERPL-MCNC: 252 MG/DL (ref 0–149)
UIBC SERPL-MCNC: 208 UG/DL (ref 111–343)
VLDLC SERPL CALC-MCNC: 41 MG/DL (ref 5–40)
WBC # BLD AUTO: 6.4 X10E3/UL (ref 3.4–10.8)

## 2023-02-13 ENCOUNTER — OFFICE VISIT (OUTPATIENT)
Dept: FAMILY MEDICINE CLINIC | Facility: CLINIC | Age: 78
End: 2023-02-13
Payer: MEDICARE

## 2023-02-13 VITALS
HEART RATE: 76 BPM | WEIGHT: 173 LBS | BODY MASS INDEX: 27.8 KG/M2 | DIASTOLIC BLOOD PRESSURE: 82 MMHG | OXYGEN SATURATION: 97 % | TEMPERATURE: 97.8 F | SYSTOLIC BLOOD PRESSURE: 170 MMHG | RESPIRATION RATE: 18 BRPM | HEIGHT: 66 IN

## 2023-02-13 DIAGNOSIS — I35.0 AORTIC STENOSIS, SEVERE: ICD-10-CM

## 2023-02-13 DIAGNOSIS — I10 ESSENTIAL HYPERTENSION: Primary | ICD-10-CM

## 2023-02-13 DIAGNOSIS — N40.1 BPH WITH OBSTRUCTION/LOWER URINARY TRACT SYMPTOMS: ICD-10-CM

## 2023-02-13 DIAGNOSIS — N13.8 BPH WITH OBSTRUCTION/LOWER URINARY TRACT SYMPTOMS: ICD-10-CM

## 2023-02-13 DIAGNOSIS — I63.9 CEREBROVASCULAR ACCIDENT (CVA), UNSPECIFIED MECHANISM: Chronic | ICD-10-CM

## 2023-02-13 DIAGNOSIS — R06.02 SHORTNESS OF BREATH: ICD-10-CM

## 2023-02-13 DIAGNOSIS — D62 ACUTE ON CHRONIC BLOOD LOSS ANEMIA: ICD-10-CM

## 2023-02-13 DIAGNOSIS — Z95.2 S/P AVR: ICD-10-CM

## 2023-02-13 DIAGNOSIS — M15.9 PRIMARY OSTEOARTHRITIS INVOLVING MULTIPLE JOINTS: ICD-10-CM

## 2023-02-13 DIAGNOSIS — E78.2 MIXED HYPERLIPIDEMIA: ICD-10-CM

## 2023-02-13 PROBLEM — M15.0 PRIMARY OSTEOARTHRITIS INVOLVING MULTIPLE JOINTS: Status: ACTIVE | Noted: 2023-02-13

## 2023-02-13 PROCEDURE — 99214 OFFICE O/P EST MOD 30 MIN: CPT | Performed by: FAMILY MEDICINE

## 2023-02-13 RX ORDER — MELOXICAM 7.5 MG/1
7.5 TABLET ORAL DAILY
Qty: 90 TABLET | Refills: 1 | Status: SHIPPED | OUTPATIENT
Start: 2023-02-13

## 2023-02-13 RX ORDER — LOSARTAN POTASSIUM 50 MG/1
50 TABLET ORAL 2 TIMES DAILY
Qty: 180 TABLET | Refills: 3 | Status: SHIPPED | OUTPATIENT
Start: 2023-02-13

## 2023-02-13 RX ORDER — SIMVASTATIN 40 MG
40 TABLET ORAL NIGHTLY
Qty: 90 TABLET | Refills: 3 | Status: SHIPPED | OUTPATIENT
Start: 2023-02-13

## 2023-02-13 RX ORDER — HYDRALAZINE HYDROCHLORIDE 25 MG/1
25 TABLET, FILM COATED ORAL 2 TIMES DAILY
Qty: 180 TABLET | Refills: 3 | Status: SHIPPED | OUTPATIENT
Start: 2023-02-13

## 2023-02-13 RX ORDER — PANTOPRAZOLE SODIUM 40 MG/1
40 TABLET, DELAYED RELEASE ORAL DAILY
Qty: 90 TABLET | Refills: 3 | Status: SHIPPED | OUTPATIENT
Start: 2023-02-13

## 2023-02-13 RX ORDER — TERAZOSIN 5 MG/1
5 CAPSULE ORAL NIGHTLY
Qty: 90 CAPSULE | Refills: 3 | Status: SHIPPED | OUTPATIENT
Start: 2023-02-13

## 2023-02-13 NOTE — PROGRESS NOTES
Subjective   Chong Manuel is a 77 y.o. male.   Chief Complaint   Patient presents with   • Hypertension   • Cerebrovascular Accident   • Anemia   • Hyperlipidemia       History of Present Illness   77-year-old white male with past medical history as below comes in today for follow-up.  I met him 3 months ago as a new patient.  No records provided from previous PCP.  I refilled all of his medications and we did extensive lab work.  He was a little bit anemic, but his hemoglobin is better than it had been for several years at 12.3.  Iron levels were good.  Lipid panel was good, blood sugar was 114 but otherwise CMP was grossly normal.    HTN- blood pressure is quite high today at 170/82.  Reports his blood pressure this morning was 118 systolic.  He reports his blood pressures are always high in the doctor's office.  He feels fine.  No chest pain, no shortness of breath, no PND.    History of stroke-no stroke like symptoms.     History of aortic stenosis with aortic valve repair- no swelling in his feet.  No orthopnea.  No signs of fluid overload.  Urinating well.    He has multilocation osteoarthritis.  He requests something to take for the arthritis.  He was told Celebrex would be a good choice for him, but it is so expensive.  He wants to try some thing else.      Patient Active Problem List    Diagnosis Date Noted   • Primary osteoarthritis involving multiple joints 02/13/2023   • BPH with obstruction/lower urinary tract symptoms 11/08/2022   • S/P AVR (tissue) by Dr. Padilla 8/5/2021 08/10/2021   • GI bleed 07/25/2021   • Aortic stenosis 06/30/2021     Note Last Updated: 6/30/2021     Added automatically from request for surgery [id number]     • Shortness of breath 04/19/2021   • Chest discomfort 04/19/2021     Note Last Updated: 4/19/2021     Added automatically from request for surgery [id number]     • Aortic stenosis, severe 04/19/2021     Note Last Updated: 4/19/2021     Added automatically from request for  surgery [id number]     • CVA (cerebral vascular accident) (HCC) 12/03/2019     Note Last Updated: 11/8/2022     2011 - took 325 ASA x years     • Acute on chronic blood loss anemia 12/02/2019   • Essential hypertension 12/02/2019   • Hyperlipidemia 12/02/2019   • Chronic GERD 12/02/2019   • Iron deficiency anemia 12/09/2013   • Pure hypercholesterolemia 12/09/2013   • Solitary pulmonary nodule 12/09/2013     Note Last Updated: 11/8/2022     On left lung.  Saw [facility] - followed, determined to be benign.  Discovered in 2011             Past Surgical History:   Procedure Laterality Date   • AORTIC VALVE REPAIR/REPLACEMENT N/A 8/5/2021    Procedure: AORTIC VALVE REPAIR/REPLACEMENT;  Surgeon: Nael Padilla MD;  Location: [facility] CV[facility];  Service: Cardiothoracic;  Laterality: N/A;   • CARDIAC CATHETERIZATION N/A 4/27/2021    Procedure: Left Heart Cath;  Surgeon: Darian Fajardo MD;  Location: [facility] CATH INVASIVE LOCATION;  Service: Cardiovascular;  Laterality: N/A;   • CARDIAC CATHETERIZATION N/A 4/27/2021    Procedure: Right Heart Cath;  Surgeon: Darian Fajardo MD;  Location: [facility] CATH INVASIVE LOCATION;  Service: Cardiovascular;  Laterality: N/A;   • COLONOSCOPY     • COLONOSCOPY N/A 7/27/2021    Procedure: COLONOSCOPY with polypectomy;  Surgeon: CIRA Pope MD;  Location: [facility] ENDOSCOPY;  Service: Gastroenterology;  Laterality: N/A;  post op: diverticulosis, polyp   • ENDOSCOPY N/A 7/26/2021    Procedure: ESOPHAGOGASTRODUODENOSCOPY;  Surgeon: CIRA Pope MD;  Location: [facility] ENDOSCOPY;  Service: Gastroenterology;  Laterality: N/A;  HH, gastric polyps   • HERNIA REPAIR       Current Outpatient Medications on File Prior to Visit   Medication Sig   • acetaminophen (TYLENOL) 325 MG tablet Take 2 tablets by mouth Every 4 (Four) Hours As Needed for Mild Pain .   • aspirin 81 MG EC tablet Take 1 tablet by mouth Daily.   • furosemide (LASIX) 20 MG tablet Take 1 tablet by mouth As Needed (swelling).   •  "[DISCONTINUED] hydrALAZINE (APRESOLINE) 25 MG tablet Take 1 tablet by mouth 2 (Two) Times a Day.   • [DISCONTINUED] losartan (Cozaar) 50 MG tablet Take 1 tablet by mouth 2 (Two) Times a Day.   • [DISCONTINUED] pantoprazole (PROTONIX) 40 MG EC tablet Take 1 tablet by mouth Daily.   • [DISCONTINUED] simvastatin (ZOCOR) 40 MG tablet Take 1 tablet by mouth Every Night.   • [DISCONTINUED] terazosin (HYTRIN) 5 MG capsule Take 1 capsule by mouth Every Night.     No current facility-administered medications on file prior to visit.     Allergies   Allergen Reactions   • Sulfa Antibiotics Other (See Comments)     Unable to specify     Social History     Socioeconomic History   • Marital status:    Tobacco Use   • Smoking status: Former     Packs/day: 1.50     Years: 54.00     Pack years: 81.00     Types: Cigarettes     Start date:      Quit date: 2011     Years since quittin.6     Passive exposure: Past   • Smokeless tobacco: Never   Vaping Use   • Vaping Use: Never used   Substance and Sexual Activity   • Alcohol use: Not Currently     Comment: quit in    • Drug use: No   • Sexual activity: Not Currently     Partners: Female     Family History   Problem Relation Age of Onset   • Heart attack Mother    • Heart failure Mother    • Cancer Father        Review of Systems    Objective   /82 (BP Location: Left arm, Patient Position: Sitting, Cuff Size: Adult)   Pulse 76   Temp 97.8 °F (36.6 °C) (Infrared)   Resp 18   Ht 167.6 cm (65.98\")   Wt 78.5 kg (173 lb)   SpO2 97%   BMI 27.94 kg/m²   Physical Exam  Constitutional:       Appearance: He is well-developed.      Comments: Well-developed elderly white male-no distress.  Looks younger than her known age of 77 years.   HENT:      Head: Normocephalic and atraumatic.   Eyes:      Conjunctiva/sclera: Conjunctivae normal.   Cardiovascular:      Rate and Rhythm: Normal rate and regular rhythm.      Heart sounds: No murmur heard.  Pulmonary:      " Effort: Pulmonary effort is normal. No respiratory distress.      Breath sounds: Normal breath sounds.   Musculoskeletal:         General: Normal range of motion.      Cervical back: Normal range of motion.      Right lower leg: No edema.      Left lower leg: No edema.   Skin:     General: Skin is warm and dry.      Findings: No rash.   Neurological:      Mental Status: He is alert and oriented to person, place, and time. Mental status is at baseline.      Gait: Gait abnormal (slow, steady.  ).   Psychiatric:         Mood and Affect: Mood normal.         Behavior: Behavior normal.           Office Visit on 11/08/2022   Component Date Value Ref Range Status   • Glucose 11/08/2022 114 (H)  70 - 99 mg/dL Final   • BUN 11/08/2022 8  8 - 27 mg/dL Final   • Creatinine 11/08/2022 1.19  0.76 - 1.27 mg/dL Final   • EGFR Result 11/08/2022 63  >59 mL/min/1.73 Final   • BUN/Creatinine Ratio 11/08/2022 7 (L)  10 - 24 Final   • Sodium 11/08/2022 139  134 - 144 mmol/L Final   • Potassium 11/08/2022 4.0  3.5 - 5.2 mmol/L Final   • Chloride 11/08/2022 103  96 - 106 mmol/L Final   • Total CO2 11/08/2022 22  20 - 29 mmol/L Final   • Calcium 11/08/2022 10.0  8.6 - 10.2 mg/dL Final   • Total Protein 11/08/2022 7.0  6.0 - 8.5 g/dL Final   • Albumin 11/08/2022 4.8 (H)  3.7 - 4.7 g/dL Final   • Globulin 11/08/2022 2.2  1.5 - 4.5 g/dL Final   • A/G Ratio 11/08/2022 2.2  1.2 - 2.2 Final   • Total Bilirubin 11/08/2022 0.2  0.0 - 1.2 mg/dL Final   • Alkaline Phosphatase 11/08/2022 37 (L)  44 - 121 IU/L Final   • AST (SGOT) 11/08/2022 15  0 - 40 IU/L Final   • ALT (SGPT) 11/08/2022 5  0 - 44 IU/L Final   • WBC 11/08/2022 6.4  3.4 - 10.8 x10E3/uL Final   • RBC 11/08/2022 4.04 (L)  4.14 - 5.80 x10E6/uL Final   • Hemoglobin 11/08/2022 12.3 (L)  13.0 - 17.7 g/dL Final   • Hematocrit 11/08/2022 36.1 (L)  37.5 - 51.0 % Final   • MCV 11/08/2022 89  79 - 97 fL Final   • MCH 11/08/2022 30.4  26.6 - 33.0 pg Final   • MCHC 11/08/2022 34.1  31.5 - 35.7  g/dL Final   • RDW 11/08/2022 12.6  11.6 - 15.4 % Final   • Platelets 11/08/2022 253  150 - 450 x10E3/uL Final   • Neutrophil Rel % 11/08/2022 66  Not Estab. % Final   • Lymphocyte Rel % 11/08/2022 23  Not Estab. % Final   • Monocyte Rel % 11/08/2022 8  Not Estab. % Final   • Eosinophil Rel % 11/08/2022 2  Not Estab. % Final   • Basophil Rel % 11/08/2022 1  Not Estab. % Final   • Neutrophils Absolute 11/08/2022 4.2  1.4 - 7.0 x10E3/uL Final   • Lymphocytes Absolute 11/08/2022 1.5  0.7 - 3.1 x10E3/uL Final   • Monocytes Absolute 11/08/2022 0.5  0.1 - 0.9 x10E3/uL Final   • Eosinophils Absolute 11/08/2022 0.1  0.0 - 0.4 x10E3/uL Final   • Basophils Absolute 11/08/2022 0.1  0.0 - 0.2 x10E3/uL Final   • Immature Granulocyte Rel % 11/08/2022 0  Not Estab. % Final   • Immature Grans Absolute 11/08/2022 0.0  0.0 - 0.1 x10E3/uL Final   • Total Cholesterol 11/08/2022 153  100 - 199 mg/dL Final   • Triglycerides 11/08/2022 252 (H)  0 - 149 mg/dL Final   • HDL Cholesterol 11/08/2022 41  >39 mg/dL Final   • VLDL Cholesterol Alcon 11/08/2022 41 (H)  5 - 40 mg/dL Final   • LDL Chol Calc ([facility]) 11/08/2022 71  0 - 99 mg/dL Final   • TIBC 11/08/2022 310  250 - 450 ug/dL Final   • UIBC 11/08/2022 208  111 - 343 ug/dL Final   • Iron 11/08/2022 102  38 - 169 ug/dL Final   • Iron Saturation 11/08/2022 33  15 - 55 % Final         Assessment & Plan   Diagnoses and all orders for this visit:    1. Essential hypertension (Primary)  -     hydrALAZINE (APRESOLINE) 25 MG tablet; Take 1 tablet by mouth 2 (Two) Times a Day.  Dispense: 180 tablet; Refill: 3  -     losartan (Cozaar) 50 MG tablet; Take 1 tablet by mouth 2 (Two) Times a Day.  Dispense: 180 tablet; Refill: 3    2. Cerebrovascular accident (CVA), unspecified mechanism (HCC)  -     hydrALAZINE (APRESOLINE) 25 MG tablet; Take 1 tablet by mouth 2 (Two) Times a Day.  Dispense: 180 tablet; Refill: 3  -     losartan (Cozaar) 50 MG tablet; Take 1 tablet by mouth 2 (Two) Times a Day.  Dispense:  180 tablet; Refill: 3    3. Acute on chronic blood loss anemia  -     pantoprazole (PROTONIX) 40 MG EC tablet; Take 1 tablet by mouth Daily.  Dispense: 90 tablet; Refill: 3    4. Aortic stenosis, severe  -     hydrALAZINE (APRESOLINE) 25 MG tablet; Take 1 tablet by mouth 2 (Two) Times a Day.  Dispense: 180 tablet; Refill: 3  -     losartan (Cozaar) 50 MG tablet; Take 1 tablet by mouth 2 (Two) Times a Day.  Dispense: 180 tablet; Refill: 3    5. Mixed hyperlipidemia  -     hydrALAZINE (APRESOLINE) 25 MG tablet; Take 1 tablet by mouth 2 (Two) Times a Day.  Dispense: 180 tablet; Refill: 3  -     simvastatin (ZOCOR) 40 MG tablet; Take 1 tablet by mouth Every Night.  Dispense: 90 tablet; Refill: 3    6. S/P AVR (tissue) by Dr. Padilla 8/5/2021  -     hydrALAZINE (APRESOLINE) 25 MG tablet; Take 1 tablet by mouth 2 (Two) Times a Day.  Dispense: 180 tablet; Refill: 3    7. Shortness of breath  -     losartan (Cozaar) 50 MG tablet; Take 1 tablet by mouth 2 (Two) Times a Day.  Dispense: 180 tablet; Refill: 3    8. BPH with obstruction/lower urinary tract symptoms  -     terazosin (HYTRIN) 5 MG capsule; Take 1 capsule by mouth Every Night.  Dispense: 90 capsule; Refill: 3    9. Primary osteoarthritis involving multiple joints  -     meloxicam (Mobic) 7.5 MG tablet; Take 1 tablet by mouth Daily.  Dispense: 90 tablet; Refill: 1    Status of multiple chronic medical problems reviewed today.  Everything looks stable.  He needs refills on all of his medications.  I refilled everything at the current doses.  His labs from 3 months ago were excellent.  We will plan to see him again in 3 months to make sure he is still doing well, check his blood pressure, etc.  We will try meloxicam 7.5 mg/day.  I think a lower dose would be safer given his high blood pressure.  He is tolerating all medicines without side effects.  Anticipate doing lab work once a year.        Call with any problems or concerns before next visit       Return in about 3  months (around 5/13/2023).      Much of this report is an electronic transcription of spoken language to printed text using Dragon dictation software.  As such, the subtleties and finesse of spoken language may permit erroneous, or at times, nonsensical words or phrases to be inadvertently transcribed; thus changes may be made at a later date to rectify these errors.     Aaliyah Hearn MD2/13/202313:27 EST  This note has been electronically signed

## 2023-04-20 ENCOUNTER — OFFICE VISIT (OUTPATIENT)
Dept: FAMILY MEDICINE CLINIC | Facility: CLINIC | Age: 78
End: 2023-04-20
Payer: MEDICARE

## 2023-04-20 ENCOUNTER — TELEPHONE (OUTPATIENT)
Dept: FAMILY MEDICINE CLINIC | Facility: CLINIC | Age: 78
End: 2023-04-20
Payer: MEDICARE

## 2023-04-20 VITALS
DIASTOLIC BLOOD PRESSURE: 63 MMHG | HEART RATE: 105 BPM | BODY MASS INDEX: 27.58 KG/M2 | OXYGEN SATURATION: 97 % | TEMPERATURE: 97.7 F | HEIGHT: 66 IN | SYSTOLIC BLOOD PRESSURE: 119 MMHG | WEIGHT: 171.6 LBS | RESPIRATION RATE: 18 BRPM

## 2023-04-20 DIAGNOSIS — R06.02 SOB (SHORTNESS OF BREATH): ICD-10-CM

## 2023-04-20 DIAGNOSIS — J18.9 PNEUMONIA OF BOTH LOWER LOBES DUE TO INFECTIOUS ORGANISM: Primary | ICD-10-CM

## 2023-04-20 DIAGNOSIS — R05.1 ACUTE COUGH: ICD-10-CM

## 2023-04-20 PROCEDURE — 3078F DIAST BP <80 MM HG: CPT | Performed by: NURSE PRACTITIONER

## 2023-04-20 PROCEDURE — 3074F SYST BP LT 130 MM HG: CPT | Performed by: NURSE PRACTITIONER

## 2023-04-20 PROCEDURE — 99213 OFFICE O/P EST LOW 20 MIN: CPT | Performed by: NURSE PRACTITIONER

## 2023-04-20 PROCEDURE — 1159F MED LIST DOCD IN RCRD: CPT | Performed by: NURSE PRACTITIONER

## 2023-04-20 PROCEDURE — 1160F RVW MEDS BY RX/DR IN RCRD: CPT | Performed by: NURSE PRACTITIONER

## 2023-04-20 RX ORDER — ALBUTEROL SULFATE 90 UG/1
2 AEROSOL, METERED RESPIRATORY (INHALATION) EVERY 4 HOURS PRN
Qty: 18 G | Refills: 0
Start: 2023-04-20

## 2023-04-20 RX ORDER — METHYLPREDNISOLONE 4 MG/1
TABLET ORAL
Qty: 21 EACH | Refills: 0 | Status: SHIPPED | OUTPATIENT
Start: 2023-04-20 | End: 2023-04-25

## 2023-04-20 RX ORDER — CLARITHROMYCIN 500 MG/1
500 TABLET, COATED ORAL 2 TIMES DAILY
Qty: 20 TABLET | Refills: 0 | Status: SHIPPED | OUTPATIENT
Start: 2023-04-20

## 2023-04-20 RX ORDER — GUAIFENESIN AND CODEINE PHOSPHATE 100; 10 MG/5ML; MG/5ML
5 SOLUTION ORAL 3 TIMES DAILY PRN
Qty: 50 ML | Refills: 0 | Status: SHIPPED | OUTPATIENT
Start: 2023-04-20

## 2023-04-20 NOTE — TELEPHONE ENCOUNTER
Caller: [facility] [address] [place] [address], [place] - [address] [facility] - [phone]  - [phone] FX    Relationship to patient: Pharmacy    Best call back number: [phone]  Patient is needing: PHARMACY IS CALLING TO INFORM PROVIDER THAT IS A MEDICATION CONFLICT BETWEEN   simvastatin (ZOCOR) 40 MG tablet AND   BIAXIN 500MG

## 2023-04-20 NOTE — TELEPHONE ENCOUNTER
I am aware.  Please notify pharmacy and call patient to stop his cholesterol medicine for a few days while taking the antibiotic.

## 2023-04-20 NOTE — PROGRESS NOTES
"Chief Complaint  Shortness of Breath, Heartburn, and Cough    Subjective          Chong Manuel presents to [facility]      History of Present Illness    Chong is a 77 year old male patient of Dr. Aaliyah Hearn who presents today with SOB, reflux, and cough.     Patient reports GERD flared up a little over a week ago.  He has not been avoiding foods that trigger this and also been late-night eating right before bed.  He also admits to not propping up even though he is aware that he should.  He denies orthopnea, pedal edema. Patient indicates with a typical GERD flareup he coughs for a few days and then it goes away however this time his cough has stayed persistent.  He does indicate he is short of breath with rest and with activity.  He did mow his lawn Tuesday which made his cough a little worse.  He reports cough is worse during the daytime and is productive and yellow.  He denies any fevers but has felt chilled in the evening.  He is taking Mucinex which has helped.       Objective     Vital Signs:   /63 (BP Location: Left arm, Patient Position: Sitting, Cuff Size: Adult)   Pulse 105   Temp 97.7 °F (36.5 °C) (Infrared)   Resp 18   Ht 167.6 cm (65.98\")   Wt 77.8 kg (171 lb 9.6 oz)   SpO2 97%   BMI 27.71 kg/m²           Physical Exam  Vitals reviewed.   Constitutional:       Appearance: He is well-developed.      Comments: Wearing a face mask     HENT:      Head: Normocephalic and atraumatic.      Nose: Nose normal.      Mouth/Throat:      Mouth: Mucous membranes are moist.      Pharynx: Oropharynx is clear.   Eyes:      Conjunctiva/sclera: Conjunctivae normal.      Pupils: Pupils are equal, round, and reactive to light.   Cardiovascular:      Rate and Rhythm: Normal rate and regular rhythm.      Pulses: Normal pulses.   Pulmonary:      Effort: Pulmonary effort is normal. No respiratory distress.      Breath sounds: Normal breath sounds.   Musculoskeletal:    "      General: Normal range of motion.      Cervical back: Normal range of motion.      Right lower leg: No edema.      Left lower leg: No edema.   Lymphadenopathy:      Head:      Right side of head: No submental, submandibular, tonsillar, preauricular, posterior auricular or occipital adenopathy.      Left side of head: No submental, submandibular, tonsillar, preauricular, posterior auricular or occipital adenopathy.   Skin:     General: Skin is warm and dry.      Findings: No rash.   Neurological:      General: No focal deficit present.      Mental Status: He is alert and oriented to person, place, and time.   Psychiatric:         Attention and Perception: Attention and perception normal.         Behavior: Behavior normal.         Cognition and Memory: Cognition normal.                Result Review :                                   Assessment and Plan      Diagnoses and all orders for this visit:    1. Pneumonia of both lower lobes due to infectious organism (Primary)  -     clarithromycin (BIAXIN) 500 MG tablet; Take 1 tablet by mouth 2 (Two) Times a Day.  Dispense: 20 tablet; Refill: 0  -     methylPREDNISolone (MEDROL) 4 MG dose pack; Take as directed on package instructions.  Dispense: 21 each; Refill: 0  -     guaiFENesin-codeine (GUAIFENESIN AC) 100-10 MG/5ML liquid; Take 5 mL by mouth 3 (Three) Times a Day As Needed for Cough or Congestion.  Dispense: 50 mL; Refill: 0    2. SOB (shortness of breath)  -     XR Chest 2 View  -     albuterol sulfate  (90 Base) MCG/ACT inhaler; Inhale 2 puffs Every 4 (Four) Hours As Needed for Shortness of Air.  Dispense: 18 g; Refill: 0  -     methylPREDNISolone (MEDROL) 4 MG dose pack; Take as directed on package instructions.  Dispense: 21 each; Refill: 0    3. Acute cough  -     XR Chest 2 View  -     guaiFENesin-codeine (GUAIFENESIN AC) 100-10 MG/5ML liquid; Take 5 mL by mouth 3 (Three) Times a Day As Needed for Cough or Congestion.  Dispense: 50 mL; Refill:  0        Obtaining chest x-ray to evaluate patient although lungs are CTA on examination and no other symptoms present.  Patient reports cough but no cough during exam.  Due to patient GERD flareup it is possible he could have aspirated causing some inflammation or infection. Patient offered albuterol inhaler today but reports he has 1 at home and declined prescription.  Placed order in chart but did not send to pharmacy.     Patient does have a history of aortic stenosis with AVR.  No signs of fluid overload.  Advised patient if he develops any pedal swelling frothy sputum, worsening SOB, chest pain to go to ER.  Going to treat for atypical pneumonia with Biaxin, Medrol pack and guaifenesin.  Patient to return in a week if no improvement.          Follow Up       No follow-ups on file.      Patient was given instructions and counseling regarding his condition or for health maintenance advice. Please see specific information pulled into the AVS if appropriate.     Jennifer Cedillo, APRN4/20/202310:24 EDT  This note has been electronically signed

## 2023-04-28 PROBLEM — E78.2 MIXED HYPERLIPIDEMIA: Status: ACTIVE | Noted: 2019-12-02

## 2023-04-28 NOTE — PROGRESS NOTES
Date of Office Visit: 2023  Encounter Provider: Dr. Darian Fajardo  Place of Service: [facility]  Patient Name: Chong Manuel  :1945  Aaliyah Hearn MD    Chief Complaint   Patient presents with   • Hypertension   • Hyperlipidemia   • Follow-up     History of Present Illness    I am pleased to see Mr. Manuel in my office today as a follow-up    As you know, patient is 77 years old white gentleman whose past medical history is significant for hypertension, hyperlipidemia, who came today for follow-up.    In 2021, patient was presented to me for shortness of breath.  Patient underwent echocardiogram which showed normal left ventricle size and function with LVEF of 55 to 60%.  Severe aortic stenosis noted with peak gradient of 100 mmHg.  Patient underwent RAFAT which confirmed findings of severe aortic stenosis.  Subsequent cardiac catheterization showed no significant CAD and transvalvular gradient on cath was 38 mm mean.  Aortic valve area was 0.8 cm².  Patient underwent AVR and did well.    Patient came today for follow-up.  Patient denies any symptom of chest pain or tightness or heaviness.  No shortness of breath.  No orthopnea PND no leg edema.    EKG showed normal sinus rhythm.  No ST changes.  No change from previous EKG    His blood pressure is still elevated.  I would increase hydralazine to 25 mg twice daily.  Blood pressure monitoring is recommended.  I will repeat echocardiogram next year.        Past Medical History:   Diagnosis Date   • Anemia    • GERD (gastroesophageal reflux disease)    • History of transfusion    • Hyperlipidemia    • Hypertension    • Lung nodules     left    • Shortness of breath 2021   • Stroke          Past Surgical History:   Procedure Laterality Date   • AORTIC VALVE REPAIR/REPLACEMENT N/A 2021    Procedure: AORTIC VALVE REPAIR/REPLACEMENT;  Surgeon: Nael Padilla MD;  Location: [facility];  Service:  Cardiothoracic;  Laterality: N/A;   • CARDIAC CATHETERIZATION N/A 4/27/2021    Procedure: Left Heart Cath;  Surgeon: Darian Fajardo MD;  Location: [facility] CATH INVASIVE LOCATION;  Service: Cardiovascular;  Laterality: N/A;   • CARDIAC CATHETERIZATION N/A 4/27/2021    Procedure: Right Heart Cath;  Surgeon: Darian Fajardo MD;  Location: [facility] CATH INVASIVE LOCATION;  Service: Cardiovascular;  Laterality: N/A;   • COLONOSCOPY     • COLONOSCOPY N/A 7/27/2021    Procedure: COLONOSCOPY with polypectomy;  Surgeon: CIRA Pope MD;  Location: [facility] ENDOSCOPY;  Service: Gastroenterology;  Laterality: N/A;  post op: diverticulosis, polyp   • ENDOSCOPY N/A 7/26/2021    Procedure: ESOPHAGOGASTRODUODENOSCOPY;  Surgeon: CIRA Pope MD;  Location: [facility] ENDOSCOPY;  Service: Gastroenterology;  Laterality: N/A;  HH, gastric polyps   • HERNIA REPAIR             Current Outpatient Medications:   •  acetaminophen (TYLENOL) 325 MG tablet, Take 2 tablets by mouth Every 4 (Four) Hours As Needed for Mild Pain ., Disp: , Rfl:   •  aspirin 81 MG EC tablet, Take 1 tablet by mouth Daily., Disp: 30 tablet, Rfl: 3  •  hydrALAZINE (APRESOLINE) 25 MG tablet, Take 1 tablet by mouth 2 (Two) Times a Day., Disp: 180 tablet, Rfl: 3  •  losartan (Cozaar) 50 MG tablet, Take 1 tablet by mouth 2 (Two) Times a Day. (Patient taking differently: Take 25 mg by mouth 2 (Two) Times a Day.), Disp: 180 tablet, Rfl: 3  •  pantoprazole (PROTONIX) 40 MG EC tablet, Take 1 tablet by mouth Daily., Disp: 90 tablet, Rfl: 3  •  simvastatin (ZOCOR) 40 MG tablet, Take 1 tablet by mouth Every Night., Disp: 90 tablet, Rfl: 3  •  terazosin (HYTRIN) 5 MG capsule, Take 1 capsule by mouth Every Night., Disp: 90 capsule, Rfl: 3      Social History     Socioeconomic History   • Marital status:    Tobacco Use   • Smoking status: Former     Packs/day: 1.50     Years: 54.00     Pack years: 81.00     Types: Cigarettes     Start date: 1957     Quit date: 6/21/2011     " Years since quittin.8     Passive exposure: Past   • Smokeless tobacco: Never   Vaping Use   • Vaping Use: Never used   Substance and Sexual Activity   • Alcohol use: Not Currently     Comment: quit in    • Drug use: No   • Sexual activity: Not Currently     Partners: Female         Review of Systems   Constitutional: Negative for chills and fever.   HENT: Negative for ear discharge and nosebleeds.    Eyes: Negative for discharge and redness.   Cardiovascular: Negative for chest pain, orthopnea, palpitations, paroxysmal nocturnal dyspnea and syncope.   Respiratory: Positive for shortness of breath. Negative for cough and wheezing.    Endocrine: Negative for heat intolerance.   Skin: Negative for rash.   Musculoskeletal: Negative for arthritis and myalgias.   Gastrointestinal: Negative for abdominal pain, melena, nausea and vomiting.   Genitourinary: Negative for dysuria and hematuria.   Neurological: Negative for dizziness, light-headedness, numbness and tremors.   Psychiatric/Behavioral: Negative for depression. The patient is not nervous/anxious.        Procedures    Procedures    No orders to display           Objective:    /73 (BP Location: Right arm, Patient Position: Sitting, Cuff Size: Large Adult)   Pulse 72   Ht 167.6 cm (65.98\")   Wt 77.1 kg (170 lb)   SpO2 96%   BMI 27.45 kg/m²         Constitutional:       Appearance: Well-developed.   Eyes:      General: No scleral icterus.        Right eye: No discharge.   HENT:      Head: Normocephalic and atraumatic.   Neck:      Thyroid: No thyromegaly.      Lymphadenopathy: No cervical adenopathy.   Pulmonary:      Effort: Pulmonary effort is normal. No respiratory distress.      Breath sounds: Normal breath sounds. No wheezing. No rales.   Cardiovascular:      Normal rate. Regular rhythm.      No gallop.   Abdominal:      Tenderness: There is no abdominal tenderness.   Skin:     Findings: No erythema or rash.   Neurological:      Mental " Status: Alert and oriented to person, place, and time.             Assessment:       Diagnosis Plan   1. Cerebrovascular accident (CVA), unspecified mechanism        2. Essential hypertension        3. Mixed hyperlipidemia        4. Nonrheumatic aortic valve stenosis        5. Shortness of breath                 Plan:       MDM:    1.  Aortic stenosis:    Patient had AVR bioprosthetic.  Repeat echocardiogram next year    2.  Hypertension:    I would increase hydralazine to 25 mg every 8.    3.  Hyperlipidemia:    Patient is on simvastatin.  Repeat lipid panel testing.

## 2023-05-01 ENCOUNTER — OFFICE VISIT (OUTPATIENT)
Dept: CARDIOLOGY | Facility: CLINIC | Age: 78
End: 2023-05-01
Payer: MEDICARE

## 2023-05-01 VITALS
SYSTOLIC BLOOD PRESSURE: 153 MMHG | HEART RATE: 72 BPM | OXYGEN SATURATION: 96 % | BODY MASS INDEX: 27.32 KG/M2 | WEIGHT: 170 LBS | HEIGHT: 66 IN | DIASTOLIC BLOOD PRESSURE: 73 MMHG

## 2023-05-01 DIAGNOSIS — I10 ESSENTIAL HYPERTENSION: ICD-10-CM

## 2023-05-01 DIAGNOSIS — I63.9 CEREBROVASCULAR ACCIDENT (CVA), UNSPECIFIED MECHANISM: Primary | Chronic | ICD-10-CM

## 2023-05-01 DIAGNOSIS — E78.2 MIXED HYPERLIPIDEMIA: ICD-10-CM

## 2023-05-01 DIAGNOSIS — R06.02 SHORTNESS OF BREATH: ICD-10-CM

## 2023-05-01 DIAGNOSIS — I35.0 NONRHEUMATIC AORTIC VALVE STENOSIS: ICD-10-CM

## 2023-05-15 ENCOUNTER — OFFICE VISIT (OUTPATIENT)
Dept: FAMILY MEDICINE CLINIC | Facility: CLINIC | Age: 78
End: 2023-05-15
Payer: MEDICARE

## 2023-05-15 VITALS
RESPIRATION RATE: 18 BRPM | HEIGHT: 66 IN | BODY MASS INDEX: 27.38 KG/M2 | SYSTOLIC BLOOD PRESSURE: 170 MMHG | HEART RATE: 67 BPM | WEIGHT: 170.4 LBS | DIASTOLIC BLOOD PRESSURE: 68 MMHG | TEMPERATURE: 97.3 F

## 2023-05-15 DIAGNOSIS — I10 WHITE COAT SYNDROME WITH DIAGNOSIS OF HYPERTENSION: ICD-10-CM

## 2023-05-15 DIAGNOSIS — Z95.2 S/P AVR: ICD-10-CM

## 2023-05-15 DIAGNOSIS — K21.9 CHRONIC GERD: ICD-10-CM

## 2023-05-15 DIAGNOSIS — E78.00 PURE HYPERCHOLESTEROLEMIA: ICD-10-CM

## 2023-05-15 DIAGNOSIS — I10 ESSENTIAL HYPERTENSION: Primary | ICD-10-CM

## 2023-05-15 DIAGNOSIS — I35.0 AORTIC STENOSIS, SEVERE: ICD-10-CM

## 2023-05-15 PROCEDURE — 3078F DIAST BP <80 MM HG: CPT | Performed by: FAMILY MEDICINE

## 2023-05-15 PROCEDURE — 99213 OFFICE O/P EST LOW 20 MIN: CPT | Performed by: FAMILY MEDICINE

## 2023-05-15 PROCEDURE — 3077F SYST BP >= 140 MM HG: CPT | Performed by: FAMILY MEDICINE

## 2023-05-15 PROCEDURE — 1160F RVW MEDS BY RX/DR IN RCRD: CPT | Performed by: FAMILY MEDICINE

## 2023-05-15 PROCEDURE — 1159F MED LIST DOCD IN RCRD: CPT | Performed by: FAMILY MEDICINE

## 2023-05-15 NOTE — PROGRESS NOTES
Subjective   Chong Manuel is a 77 y.o. male.   Chief Complaint   Patient presents with   • Hypertension   • Hyperlipidemia       History of Present Illness   Presents to the office today to follow-up on chronic medical problems for active problem list as below.  I last saw him about 3 months ago and he transferred here about 6 months ago.  We did extensive labs 6 months ago in November.    HTN-blood pressure is high today at 185/84.  On recheck he is 170/68.    He saw Dr. Fajardo 2 weeks ago.  He recommended increasing hydralazine to 25 mg twice a day and repeating an echocardiogram in a year.  He reports that his blood pressure this morning was 128 before he even took his medications.  He tells me his blood pressures at home are always below 140.    He was in here and saw Saundra on April 20, just about a month ago for pneumonia.  He had bibasilar opacities on chest x-ray.  He feels fine again.  No cough.  He really thinks that he had reflux at night.  Since that time he is also lost a few pounds, he is not eating as late and he is propping himself up on a pillow to minimize the reflux symptoms.  He is happy with this.  He is also on pantoprazole and takes that every day.  Patient Active Problem List    Diagnosis Date Noted   • White coat syndrome with diagnosis of hypertension 05/15/2023   • Pneumonia of both lower lobes due to infectious organism 04/20/2023   • Acute cough 04/20/2023   • SOB (shortness of breath) 04/20/2023   • Primary osteoarthritis involving multiple joints 02/13/2023   • BPH with obstruction/lower urinary tract symptoms 11/08/2022   • S/P AVR (tissue) by Dr. Padilla 8/5/2021 08/10/2021   • GI bleed 07/25/2021   • Aortic stenosis 06/30/2021     Note Last Updated: 6/30/2021     Added automatically from request for surgery [id number]     • Shortness of breath 04/19/2021   • Chest discomfort 04/19/2021     Note Last Updated: 4/19/2021     Added automatically from request for surgery [id number]     •  Aortic stenosis, severe 04/19/2021     Note Last Updated: 4/19/2021     Added automatically from request for surgery [id number]     • CVA (cerebral vascular accident) 12/03/2019     Note Last Updated: 11/8/2022 2011 - took 325 ASA x years     • Acute on chronic blood loss anemia 12/02/2019   • Essential hypertension 12/02/2019   • Mixed hyperlipidemia 12/02/2019   • Chronic GERD 12/02/2019   • Iron deficiency anemia 12/09/2013   • Pure hypercholesterolemia 12/09/2013   • Solitary pulmonary nodule 12/09/2013     Note Last Updated: 11/8/2022     On left lung.  Saw [facility] - followed, determined to be benign.  Discovered in 2011             Past Surgical History:   Procedure Laterality Date   • AORTIC VALVE REPAIR/REPLACEMENT N/A 8/5/2021    Procedure: AORTIC VALVE REPAIR/REPLACEMENT;  Surgeon: Nael Padilla MD;  Location: [facility];  Service: Cardiothoracic;  Laterality: N/A;   • CARDIAC CATHETERIZATION N/A 4/27/2021    Procedure: Left Heart Cath;  Surgeon: Darian Fajardo MD;  Location: [facility] CATH INVASIVE LOCATION;  Service: Cardiovascular;  Laterality: N/A;   • CARDIAC CATHETERIZATION N/A 4/27/2021    Procedure: Right Heart Cath;  Surgeon: Darian Fajardo MD;  Location: [facility] CATH INVASIVE LOCATION;  Service: Cardiovascular;  Laterality: N/A;   • COLONOSCOPY     • COLONOSCOPY N/A 7/27/2021    Procedure: COLONOSCOPY with polypectomy;  Surgeon: CIRA Pope MD;  Location: [facility] ENDOSCOPY;  Service: Gastroenterology;  Laterality: N/A;  post op: diverticulosis, polyp   • ENDOSCOPY N/A 7/26/2021    Procedure: ESOPHAGOGASTRODUODENOSCOPY;  Surgeon: CIRA Pope MD;  Location: [facility] ENDOSCOPY;  Service: Gastroenterology;  Laterality: N/A;  HH, gastric polyps   • HERNIA REPAIR       Current Outpatient Medications on File Prior to Visit   Medication Sig   • aspirin 81 MG EC tablet Take 1 tablet by mouth Daily.   • hydrALAZINE (APRESOLINE) 25 MG tablet Take 1 tablet by mouth 2 (Two) Times a Day.   • losartan  "(Cozaar) 50 MG tablet Take 1 tablet by mouth 2 (Two) Times a Day.   • pantoprazole (PROTONIX) 40 MG EC tablet Take 1 tablet by mouth Daily.   • simvastatin (ZOCOR) 40 MG tablet Take 1 tablet by mouth Every Night.   • terazosin (HYTRIN) 5 MG capsule Take 1 capsule by mouth Every Night.   • acetaminophen (TYLENOL) 325 MG tablet Take 2 tablets by mouth Every 4 (Four) Hours As Needed for Mild Pain .     No current facility-administered medications on file prior to visit.     Allergies   Allergen Reactions   • Sulfa Antibiotics Other (See Comments)     Unable to specify     Social History     Socioeconomic History   • Marital status:    Tobacco Use   • Smoking status: Former     Packs/day: 1.50     Years: 54.00     Pack years: 81.00     Types: Cigarettes     Start date:      Quit date: 2011     Years since quittin.9     Passive exposure: Past   • Smokeless tobacco: Never   Vaping Use   • Vaping Use: Never used   Substance and Sexual Activity   • Alcohol use: Not Currently     Comment: quit in    • Drug use: No   • Sexual activity: Not Currently     Partners: Female     Family History   Problem Relation Age of Onset   • Heart attack Mother    • Heart failure Mother    • Cancer Father        Review of Systems    Objective   /68 (BP Location: Right arm, Patient Position: Sitting, Cuff Size: Adult)   Pulse 67   Temp 97.3 °F (36.3 °C) (Infrared)   Resp 18   Ht 167.6 cm (65.98\")   Wt 77.3 kg (170 lb 6.4 oz)   BMI 27.52 kg/m²   Physical Exam  Constitutional:       Appearance: He is well-developed. He is not toxic-appearing.      Comments: Robust appearing elderly WM.  Age belies known age of 77 years     HENT:      Head: Normocephalic and atraumatic.   Eyes:      Conjunctiva/sclera: Conjunctivae normal.   Cardiovascular:      Rate and Rhythm: Normal rate and regular rhythm.      Heart sounds: Murmur (stable) heard.    Systolic murmur is present with a grade of 3/6.  Pulmonary:      Effort: " Pulmonary effort is normal. No respiratory distress.      Breath sounds: Normal breath sounds.   Musculoskeletal:         General: Normal range of motion.      Cervical back: Normal range of motion.      Right lower leg: No edema.      Left lower leg: No edema.   Skin:     General: Skin is warm and dry.      Findings: No rash.   Neurological:      Mental Status: He is alert and oriented to person, place, and time.      Gait: Gait normal.   Psychiatric:         Mood and Affect: Mood normal.         Behavior: Behavior normal.           No visits with results within 4 Month(s) from this visit.   Latest known visit with results is:   Office Visit on 11/08/2022   Component Date Value Ref Range Status   • Glucose 11/08/2022 114 (H)  70 - 99 mg/dL Final   • BUN 11/08/2022 8  8 - 27 mg/dL Final   • Creatinine 11/08/2022 1.19  0.76 - 1.27 mg/dL Final   • EGFR Result 11/08/2022 63  >59 mL/min/1.73 Final   • BUN/Creatinine Ratio 11/08/2022 7 (L)  10 - 24 Final   • Sodium 11/08/2022 139  134 - 144 mmol/L Final   • Potassium 11/08/2022 4.0  3.5 - 5.2 mmol/L Final   • Chloride 11/08/2022 103  96 - 106 mmol/L Final   • Total CO2 11/08/2022 22  20 - 29 mmol/L Final   • Calcium 11/08/2022 10.0  8.6 - 10.2 mg/dL Final   • Total Protein 11/08/2022 7.0  6.0 - 8.5 g/dL Final   • Albumin 11/08/2022 4.8 (H)  3.7 - 4.7 g/dL Final   • Globulin 11/08/2022 2.2  1.5 - 4.5 g/dL Final   • A/G Ratio 11/08/2022 2.2  1.2 - 2.2 Final   • Total Bilirubin 11/08/2022 0.2  0.0 - 1.2 mg/dL Final   • Alkaline Phosphatase 11/08/2022 37 (L)  44 - 121 IU/L Final   • AST (SGOT) 11/08/2022 15  0 - 40 IU/L Final   • ALT (SGPT) 11/08/2022 5  0 - 44 IU/L Final   • WBC 11/08/2022 6.4  3.4 - 10.8 x10E3/uL Final   • RBC 11/08/2022 4.04 (L)  4.14 - 5.80 x10E6/uL Final   • Hemoglobin 11/08/2022 12.3 (L)  13.0 - 17.7 g/dL Final   • Hematocrit 11/08/2022 36.1 (L)  37.5 - 51.0 % Final   • MCV 11/08/2022 89  79 - 97 fL Final   • MCH 11/08/2022 30.4  26.6 - 33.0 pg Final    • MCHC 11/08/2022 34.1  31.5 - 35.7 g/dL Final   • RDW 11/08/2022 12.6  11.6 - 15.4 % Final   • Platelets 11/08/2022 253  150 - 450 x10E3/uL Final   • Neutrophil Rel % 11/08/2022 66  Not Estab. % Final   • Lymphocyte Rel % 11/08/2022 23  Not Estab. % Final   • Monocyte Rel % 11/08/2022 8  Not Estab. % Final   • Eosinophil Rel % 11/08/2022 2  Not Estab. % Final   • Basophil Rel % 11/08/2022 1  Not Estab. % Final   • Neutrophils Absolute 11/08/2022 4.2  1.4 - 7.0 x10E3/uL Final   • Lymphocytes Absolute 11/08/2022 1.5  0.7 - 3.1 x10E3/uL Final   • Monocytes Absolute 11/08/2022 0.5  0.1 - 0.9 x10E3/uL Final   • Eosinophils Absolute 11/08/2022 0.1  0.0 - 0.4 x10E3/uL Final   • Basophils Absolute 11/08/2022 0.1  0.0 - 0.2 x10E3/uL Final   • Immature Granulocyte Rel % 11/08/2022 0  Not Estab. % Final   • Immature Grans Absolute 11/08/2022 0.0  0.0 - 0.1 x10E3/uL Final   • Total Cholesterol 11/08/2022 153  100 - 199 mg/dL Final   • Triglycerides 11/08/2022 252 (H)  0 - 149 mg/dL Final   • HDL Cholesterol 11/08/2022 41  >39 mg/dL Final   • VLDL Cholesterol Alcon 11/08/2022 41 (H)  5 - 40 mg/dL Final   • LDL Chol Calc ([facility]) 11/08/2022 71  0 - 99 mg/dL Final   • TIBC 11/08/2022 310  250 - 450 ug/dL Final   • UIBC 11/08/2022 208  111 - 343 ug/dL Final   • Iron 11/08/2022 102  38 - 169 ug/dL Final   • Iron Saturation 11/08/2022 33  15 - 55 % Final         Assessment & Plan   Diagnoses and all orders for this visit:    1. Essential hypertension (Primary)    2. Aortic stenosis, severe    3. Pure hypercholesterolemia    4. S/P AVR (tissue) by Dr. Padilla 8/5/2021    5. Chronic GERD    He looks good, he feels good.  His vital signs other than his blood pressure are good.  Lungs are clear.  He may have had a bilateral pneumonia.  Those symptoms are resolved.  He is not having symptoms of reflux anymore.  He has made the above changes and that has helped and he is content.  If things worsen, we can always change PPIs, look into an upper  GI or possibly even consider having him see a surgeon for an endoscopic fundoplication.  That would be an extreme response, though.  I think he clearly has an element of whitecoat syndrome.  I am going to add that to his chart.  He checks his blood pressure every day.  I definitely think he should continue that, keeping clear log of those numbers so they will be available for anyone evaluating his blood pressure.  We are going to do labs once a year.  I will see him back in November and we will check his labs at that point.  He will then need a lipid panel, CMP, CBC.  He has had no evidence of recurrent upper GI bleeding since the event 4 years ago.  Continue current blood pressure medicines at current doses.  Continue Zocor at 40 mg/day.  Continue daily aspirin.  Keep follow-up with Dr. Fajardo per his recommendations.  Ankush tells me that he is going to do an echocardiogram in the fall.          Call with any problems or concerns before next visit       Return in about 6 months (around 11/15/2023).      Much of this report is an electronic transcription of spoken language to printed text using Dragon dictation software.  As such, the subtleties and finesse of spoken language may permit erroneous, or at times, nonsensical words or phrases to be inadvertently transcribed; thus changes may be made at a later date to rectify these errors.     Aaliyah Hearn MD5/15/251257:15 EDT  This note has been electronically signed

## 2023-08-21 ENCOUNTER — OFFICE VISIT (OUTPATIENT)
Dept: FAMILY MEDICINE CLINIC | Facility: CLINIC | Age: 78
End: 2023-08-21
Payer: MEDICARE

## 2023-08-21 VITALS
SYSTOLIC BLOOD PRESSURE: 156 MMHG | HEIGHT: 66 IN | BODY MASS INDEX: 27.19 KG/M2 | WEIGHT: 169.2 LBS | RESPIRATION RATE: 18 BRPM | DIASTOLIC BLOOD PRESSURE: 77 MMHG | HEART RATE: 65 BPM | OXYGEN SATURATION: 96 %

## 2023-08-21 DIAGNOSIS — I10 ESSENTIAL HYPERTENSION: Primary | ICD-10-CM

## 2023-08-21 DIAGNOSIS — Z95.2 S/P AVR: ICD-10-CM

## 2023-08-21 DIAGNOSIS — E78.00 PURE HYPERCHOLESTEROLEMIA: ICD-10-CM

## 2023-08-21 DIAGNOSIS — I35.0 AORTIC STENOSIS, SEVERE: ICD-10-CM

## 2023-08-21 PROCEDURE — 3078F DIAST BP <80 MM HG: CPT | Performed by: FAMILY MEDICINE

## 2023-08-21 PROCEDURE — 99213 OFFICE O/P EST LOW 20 MIN: CPT | Performed by: FAMILY MEDICINE

## 2023-08-21 PROCEDURE — 3077F SYST BP >= 140 MM HG: CPT | Performed by: FAMILY MEDICINE

## 2023-08-21 NOTE — PROGRESS NOTES
Subjective   Chong Manuel is a 78 y.o. male.   Chief Complaint   Patient presents with    Hypertension    Hyperlipidemia       History of Present Illness   Presents to the office today fourth schedule says his 3-month follow-up.  I did indeed see him 3 months ago.  I thought we are going to follow-up again in 6 months.    At the last visit he reported his reflux symptoms were better.  Blood pressure was high, but he has element of whitecoat syndrome.  In office today B/p 156/77.  Home readings taken daily at home.  Average is 120's.  Machine told him he had an irregular heart beat.    Last lab work was in November 2022.  I planned to see him in November and get a lipid panel, CMP, CBC at that time.  He had a history of upper GI bleeding.  He had an aortic valve replacement by Dr. Padilla in August 2021 due to severe aortic stenosis.  Had been asymptomatic.    Dr. Fajardo is his cardiologist and he was working to get his blood pressure down.  Ankush said that he was going to order an echocardiogram, but I do not see 1.  He tells me his blood pressure monitor told him he had an irregular heartbeat.    Patient Active Problem List    Diagnosis Date Noted    White coat syndrome with diagnosis of hypertension 05/15/2023    Pneumonia of both lower lobes due to infectious organism 04/20/2023    Acute cough 04/20/2023    SOB (shortness of breath) 04/20/2023    Primary osteoarthritis involving multiple joints 02/13/2023    BPH with obstruction/lower urinary tract symptoms 11/08/2022    S/P AVR (tissue) by Dr. Padilla 8/5/2021 08/10/2021    GI bleed 07/25/2021    Aortic stenosis 06/30/2021     Note Last Updated: 6/30/2021     Added automatically from request for surgery [id number]      Shortness of breath 04/19/2021    Chest discomfort 04/19/2021     Note Last Updated: 4/19/2021     Added automatically from request for surgery [id number]      Aortic stenosis, severe 04/19/2021     Note Last Updated: 4/19/2021     Added automatically  from request for surgery [id number]      CVA (cerebral vascular accident) 12/03/2019     Note Last Updated: 11/8/2022     2011 - took 325 ASA x years      Acute on chronic blood loss anemia 12/02/2019    Essential hypertension 12/02/2019    Mixed hyperlipidemia 12/02/2019    Chronic GERD 12/02/2019    Iron deficiency anemia 12/09/2013    Pure hypercholesterolemia 12/09/2013    Solitary pulmonary nodule 12/09/2013     Note Last Updated: 11/8/2022     On left lung.  Saw KPA - followed, determined to be benign.  Discovered in 2011             Past Surgical History:   Procedure Laterality Date    AORTIC VALVE REPAIR/REPLACEMENT N/A 8/5/2021    Procedure: AORTIC VALVE REPAIR/REPLACEMENT;  Surgeon: Nael Padilla MD;  Location: [facility] CVOR;  Service: Cardiothoracic;  Laterality: N/A;    CARDIAC CATHETERIZATION N/A 4/27/2021    Procedure: Left Heart Cath;  Surgeon: Darian Fajardo MD;  Location: [facility] CATH INVASIVE LOCATION;  Service: Cardiovascular;  Laterality: N/A;    CARDIAC CATHETERIZATION N/A 4/27/2021    Procedure: Right Heart Cath;  Surgeon: Darian Fajardo MD;  Location: [facility] CATH INVASIVE LOCATION;  Service: Cardiovascular;  Laterality: N/A;    COLONOSCOPY      COLONOSCOPY N/A 7/27/2021    Procedure: COLONOSCOPY with polypectomy;  Surgeon: CIRA Pope MD;  Location: [facility] ENDOSCOPY;  Service: Gastroenterology;  Laterality: N/A;  post op: diverticulosis, polyp    ENDOSCOPY N/A 7/26/2021    Procedure: ESOPHAGOGASTRODUODENOSCOPY;  Surgeon: CIRA Pope MD;  Location: [facility] ENDOSCOPY;  Service: Gastroenterology;  Laterality: N/A;  HH, gastric polyps    HERNIA REPAIR       Current Outpatient Medications on File Prior to Visit   Medication Sig    acetaminophen (TYLENOL) 325 MG tablet Take 2 tablets by mouth Every 4 (Four) Hours As Needed for Mild Pain .    aspirin 81 MG EC tablet Take 1 tablet by mouth Daily.    hydrALAZINE (APRESOLINE) 25 MG tablet Take 1 tablet by mouth 2 (Two) Times a Day.    losartan  "(Cozaar) 50 MG tablet Take 1 tablet by mouth 2 (Two) Times a Day.    pantoprazole (PROTONIX) 40 MG EC tablet Take 1 tablet by mouth Daily.    simvastatin (ZOCOR) 40 MG tablet Take 1 tablet by mouth Every Night.    terazosin (HYTRIN) 5 MG capsule Take 1 capsule by mouth Every Night.     No current facility-administered medications on file prior to visit.     Allergies   Allergen Reactions    Sulfa Antibiotics Other (See Comments)     Unable to specify     Social History     Socioeconomic History    Marital status:    Tobacco Use    Smoking status: Former     Packs/day: 1.50     Years: 54.00     Pack years: 81.00     Types: Cigarettes     Start date:      Quit date: 2011     Years since quittin.1     Passive exposure: Past    Smokeless tobacco: Never   Vaping Use    Vaping Use: Never used   Substance and Sexual Activity    Alcohol use: Not Currently     Comment: quit in     Drug use: No    Sexual activity: Not Currently     Partners: Female     Family History   Problem Relation Age of Onset    Heart attack Mother     Heart failure Mother     Cancer Father        Review of Systems    Objective   /77 (BP Location: Right arm, Patient Position: Sitting, Cuff Size: Adult)   Pulse 65   Resp 18   Ht 167.6 cm (65.98\")   Wt 76.7 kg (169 lb 3.2 oz)   SpO2 96%   BMI 27.33 kg/mý   Physical Exam  Constitutional:       Appearance: He is well-developed. He is not toxic-appearing.      Comments: Robust elderly WM, NAD   HENT:      Head: Normocephalic and atraumatic.   Eyes:      Conjunctiva/sclera: Conjunctivae normal.   Cardiovascular:      Rate and Rhythm: Normal rate and regular rhythm.      Heart sounds: No murmur heard.  Pulmonary:      Effort: Pulmonary effort is normal. No respiratory distress.      Breath sounds: Normal breath sounds.   Musculoskeletal:         General: Normal range of motion.      Cervical back: Normal range of motion.      Right lower leg: No edema.      Left lower leg: " No edema.   Skin:     General: Skin is warm and dry.      Findings: No rash.   Neurological:      General: No focal deficit present.      Mental Status: He is alert and oriented to person, place, and time.      Gait: Gait normal.   Psychiatric:         Behavior: Behavior normal.         No visits with results within 4 Month(s) from this visit.   Latest known visit with results is:   Office Visit on 11/08/2022   Component Date Value Ref Range Status    Glucose 11/08/2022 114 (H)  70 - 99 mg/dL Final    BUN 11/08/2022 8  8 - 27 mg/dL Final    Creatinine 11/08/2022 1.19  0.76 - 1.27 mg/dL Final    EGFR Result 11/08/2022 63  >59 mL/min/1.73 Final    BUN/Creatinine Ratio 11/08/2022 7 (L)  10 - 24 Final    Sodium 11/08/2022 139  134 - 144 mmol/L Final    Potassium 11/08/2022 4.0  3.5 - 5.2 mmol/L Final    Chloride 11/08/2022 103  96 - 106 mmol/L Final    Total CO2 11/08/2022 22  20 - 29 mmol/L Final    Calcium 11/08/2022 10.0  8.6 - 10.2 mg/dL Final    Total Protein 11/08/2022 7.0  6.0 - 8.5 g/dL Final    Albumin 11/08/2022 4.8 (H)  3.7 - 4.7 g/dL Final    Globulin 11/08/2022 2.2  1.5 - 4.5 g/dL Final    A/G Ratio 11/08/2022 2.2  1.2 - 2.2 Final    Total Bilirubin 11/08/2022 0.2  0.0 - 1.2 mg/dL Final    Alkaline Phosphatase 11/08/2022 37 (L)  44 - 121 IU/L Final    AST (SGOT) 11/08/2022 15  0 - 40 IU/L Final    ALT (SGPT) 11/08/2022 5  0 - 44 IU/L Final    WBC 11/08/2022 6.4  3.4 - 10.8 x10E3/uL Final    RBC 11/08/2022 4.04 (L)  4.14 - 5.80 x10E6/uL Final    Hemoglobin 11/08/2022 12.3 (L)  13.0 - 17.7 g/dL Final    Hematocrit 11/08/2022 36.1 (L)  37.5 - 51.0 % Final    MCV 11/08/2022 89  79 - 97 fL Final    MCH 11/08/2022 30.4  26.6 - 33.0 pg Final    MCHC 11/08/2022 34.1  31.5 - 35.7 g/dL Final    RDW 11/08/2022 12.6  11.6 - 15.4 % Final    Platelets 11/08/2022 253  150 - 450 x10E3/uL Final    Neutrophil Rel % 11/08/2022 66  Not Estab. % Final    Lymphocyte Rel % 11/08/2022 23  Not Estab. % Final    Monocyte Rel %  11/08/2022 8  Not Estab. % Final    Eosinophil Rel % 11/08/2022 2  Not Estab. % Final    Basophil Rel % 11/08/2022 1  Not Estab. % Final    Neutrophils Absolute 11/08/2022 4.2  1.4 - 7.0 x10E3/uL Final    Lymphocytes Absolute 11/08/2022 1.5  0.7 - 3.1 x10E3/uL Final    Monocytes Absolute 11/08/2022 0.5  0.1 - 0.9 x10E3/uL Final    Eosinophils Absolute 11/08/2022 0.1  0.0 - 0.4 x10E3/uL Final    Basophils Absolute 11/08/2022 0.1  0.0 - 0.2 x10E3/uL Final    Immature Granulocyte Rel % 11/08/2022 0  Not Estab. % Final    Immature Grans Absolute 11/08/2022 0.0  0.0 - 0.1 x10E3/uL Final    Total Cholesterol 11/08/2022 153  100 - 199 mg/dL Final    Triglycerides 11/08/2022 252 (H)  0 - 149 mg/dL Final    HDL Cholesterol 11/08/2022 41  >39 mg/dL Final    VLDL Cholesterol Alcon 11/08/2022 41 (H)  5 - 40 mg/dL Final    LDL Chol Calc (NIH) 11/08/2022 71  0 - 99 mg/dL Final    TIBC 11/08/2022 310  250 - 450 ug/dL Final    UIBC 11/08/2022 208  111 - 343 ug/dL Final    Iron 11/08/2022 102  38 - 169 ug/dL Final    Iron Saturation 11/08/2022 33  15 - 55 % Final         Assessment & Plan   Diagnoses and all orders for this visit:    1. Essential hypertension (Primary)  -     Comprehensive Metabolic Panel; Future  -     CBC & Differential; Future    2. Pure hypercholesterolemia  -     Lipid Panel; Future    3. S/P AVR    4. Aortic stenosis, severe    Elderly white male-exam is normal.  I do not hear any irregularity today.  I offered an EKG.  He does not want to do that today.  We will wait till he sees his cardiologist in November.  We will see him back in November for Medicare wellness with lab work a few days before.  Overall, I spent 20 minutes on the day of service with Alex discussing all the above, reviewing his blood pressure log, discussing the upcoming plan.        Call with any problems or concerns before next visit       Return in about 3 months (around 11/21/2023), or for Medicare Cecilia, for with labs a few days  before.      Much of this report is an electronic transcription of spoken language to printed text using Dragon dictation software.  As such, the subtleties and finesse of spoken language may permit erroneous, or at times, nonsensical words or phrases to be inadvertently transcribed; thus changes may be made at a later date to rectify these errors.     Aaliyah Hearn MD8/21/202313:17 EDT  This note has been electronically signed

## 2023-10-25 ENCOUNTER — NURSE TRIAGE (OUTPATIENT)
Dept: CALL CENTER | Facility: HOSPITAL | Age: 78
End: 2023-10-25
Payer: MEDICARE

## 2023-10-25 ENCOUNTER — OFFICE VISIT (OUTPATIENT)
Dept: FAMILY MEDICINE CLINIC | Facility: CLINIC | Age: 78
End: 2023-10-25
Payer: MEDICARE

## 2023-10-25 VITALS
WEIGHT: 169 LBS | HEIGHT: 66 IN | TEMPERATURE: 98.2 F | RESPIRATION RATE: 18 BRPM | OXYGEN SATURATION: 94 % | HEART RATE: 80 BPM | BODY MASS INDEX: 27.16 KG/M2 | SYSTOLIC BLOOD PRESSURE: 175 MMHG | DIASTOLIC BLOOD PRESSURE: 75 MMHG

## 2023-10-25 DIAGNOSIS — J40 BRONCHITIS: Primary | ICD-10-CM

## 2023-10-25 DIAGNOSIS — R06.02 SOB (SHORTNESS OF BREATH): ICD-10-CM

## 2023-10-25 DIAGNOSIS — R05.1 ACUTE COUGH: ICD-10-CM

## 2023-10-25 PROCEDURE — 1159F MED LIST DOCD IN RCRD: CPT | Performed by: FAMILY MEDICINE

## 2023-10-25 PROCEDURE — 1160F RVW MEDS BY RX/DR IN RCRD: CPT | Performed by: FAMILY MEDICINE

## 2023-10-25 PROCEDURE — 3077F SYST BP >= 140 MM HG: CPT | Performed by: FAMILY MEDICINE

## 2023-10-25 PROCEDURE — 3078F DIAST BP <80 MM HG: CPT | Performed by: FAMILY MEDICINE

## 2023-10-25 PROCEDURE — 99214 OFFICE O/P EST MOD 30 MIN: CPT | Performed by: FAMILY MEDICINE

## 2023-10-25 RX ORDER — PREDNISONE 20 MG/1
TABLET ORAL
Qty: 19 TABLET | Refills: 0 | Status: SHIPPED | OUTPATIENT
Start: 2023-10-25

## 2023-10-25 RX ORDER — AZITHROMYCIN 250 MG/1
TABLET, FILM COATED ORAL
Qty: 6 TABLET | Refills: 0 | Status: SHIPPED | OUTPATIENT
Start: 2023-10-25

## 2023-10-25 NOTE — PROGRESS NOTES
Subjective   Chong Manuel is a 78 y.o. male.   Chief Complaint   Patient presents with    URI       History of Present Illness   Presents to the office today as a same-day add-on with a upper respiratory illness that seem to start for 5 days ago.  He had chills in the evening for about 2 hours each of those days.  He felt okay the other hours of the day.  He has been working outside a lot recently.  He started getting short of breath with working over the past 2 or 3 days.  He began coughing up yellow phlegm this morning.  Has not noticed any fevers.  He had pneumonia back in the spring.        Patient Active Problem List    Diagnosis Date Noted    White coat syndrome with diagnosis of hypertension 05/15/2023    Pneumonia of both lower lobes due to infectious organism 04/20/2023    Acute cough 04/20/2023    SOB (shortness of breath) 04/20/2023    Primary osteoarthritis involving multiple joints 02/13/2023    BPH with obstruction/lower urinary tract symptoms 11/08/2022    S/P AVR (tissue) by Dr. Padilla 8/5/2021 08/10/2021    GI bleed 07/25/2021    Aortic stenosis 06/30/2021     Note Last Updated: 6/30/2021     Added automatically from request for surgery [id number]      Shortness of breath 04/19/2021    Chest discomfort 04/19/2021     Note Last Updated: 4/19/2021     Added automatically from request for surgery [id number]      Aortic stenosis, severe 04/19/2021     Note Last Updated: 4/19/2021     Added automatically from request for surgery [id number]      CVA (cerebral vascular accident) 12/03/2019     Note Last Updated: 11/8/2022     2011 - took 325 ASA x years      Acute on chronic blood loss anemia 12/02/2019    Essential hypertension 12/02/2019    Mixed hyperlipidemia 12/02/2019    Chronic GERD 12/02/2019    Iron deficiency anemia 12/09/2013    Pure hypercholesterolemia 12/09/2013    Solitary pulmonary nodule 12/09/2013     Note Last Updated: 11/8/2022     On left lung.  Saw KPA - followed, determined to be  benign.  Discovered in 2011             Past Surgical History:   Procedure Laterality Date    AORTIC VALVE REPAIR/REPLACEMENT N/A 8/5/2021    Procedure: AORTIC VALVE REPAIR/REPLACEMENT;  Surgeon: Nael Padilla MD;  Location: [facility] CVOR;  Service: Cardiothoracic;  Laterality: N/A;    CARDIAC CATHETERIZATION N/A 4/27/2021    Procedure: Left Heart Cath;  Surgeon: Darian Fajardo MD;  Location: [facility] CATH INVASIVE LOCATION;  Service: Cardiovascular;  Laterality: N/A;    CARDIAC CATHETERIZATION N/A 4/27/2021    Procedure: Right Heart Cath;  Surgeon: Darian Fajardo MD;  Location: [facility] CATH INVASIVE LOCATION;  Service: Cardiovascular;  Laterality: N/A;    COLONOSCOPY      COLONOSCOPY N/A 7/27/2021    Procedure: COLONOSCOPY with polypectomy;  Surgeon: CIRA Pope MD;  Location: [facility] ENDOSCOPY;  Service: Gastroenterology;  Laterality: N/A;  post op: diverticulosis, polyp    ENDOSCOPY N/A 7/26/2021    Procedure: ESOPHAGOGASTRODUODENOSCOPY;  Surgeon: CIRA Pope MD;  Location: [facility] ENDOSCOPY;  Service: Gastroenterology;  Laterality: N/A;  HH, gastric polyps    HERNIA REPAIR       Current Outpatient Medications on File Prior to Visit   Medication Sig    acetaminophen (TYLENOL) 325 MG tablet Take 2 tablets by mouth Every 4 (Four) Hours As Needed for Mild Pain .    aspirin 81 MG EC tablet Take 1 tablet by mouth Daily.    hydrALAZINE (APRESOLINE) 25 MG tablet Take 1 tablet by mouth 2 (Two) Times a Day.    losartan (Cozaar) 50 MG tablet Take 1 tablet by mouth 2 (Two) Times a Day.    pantoprazole (PROTONIX) 40 MG EC tablet Take 1 tablet by mouth Daily.    simvastatin (ZOCOR) 40 MG tablet Take 1 tablet by mouth Every Night.    terazosin (HYTRIN) 5 MG capsule Take 1 capsule by mouth Every Night.     No current facility-administered medications on file prior to visit.     Allergies   Allergen Reactions    Sulfa Antibiotics Other (See Comments)     Unable to specify     Social History     Socioeconomic History     "Marital status:    Tobacco Use    Smoking status: Former     Packs/day: 1.50     Years: 54.00     Additional pack years: 0.00     Total pack years: 81.00     Types: Cigarettes     Start date:      Quit date: 2011     Years since quittin.3     Passive exposure: Past    Smokeless tobacco: Never   Vaping Use    Vaping Use: Never used   Substance and Sexual Activity    Alcohol use: Not Currently     Comment: quit in     Drug use: No    Sexual activity: Not Currently     Partners: Female     Family History   Problem Relation Age of Onset    Heart attack Mother     Heart failure Mother     Cancer Father        Review of Systems    Objective   /75 (BP Location: Right arm, Patient Position: Sitting, Cuff Size: Adult)   Pulse 80   Temp 98.2 °F (36.8 °C) (Infrared)   Resp 18   Ht 167.6 cm (65.98\")   Wt 76.7 kg (169 lb)   SpO2 94%   BMI 27.29 kg/m²   Physical Exam  Constitutional:       Appearance: He is well-developed. He is not ill-appearing.      Comments: Elderly white male-looks younger than known age of 78 years.     HENT:      Head: Normocephalic and atraumatic.   Eyes:      Conjunctiva/sclera: Conjunctivae normal.   Cardiovascular:      Rate and Rhythm: Normal rate and regular rhythm.      Heart sounds: No murmur heard.  Pulmonary:      Effort: Pulmonary effort is normal. Prolonged expiration present. No accessory muscle usage or respiratory distress.      Breath sounds: Decreased breath sounds present. No wheezing, rhonchi or rales.   Musculoskeletal:         General: Normal range of motion.      Cervical back: Normal range of motion.      Right lower leg: No edema.      Left lower leg: No edema.   Skin:     General: Skin is warm and dry.      Coloration: Skin is not pale.      Findings: No rash.   Neurological:      Mental Status: He is alert and oriented to person, place, and time.   Psychiatric:         Behavior: Behavior normal.           No visits with results within 4 " Month(s) from this visit.   Latest known visit with results is:   Office Visit on 11/08/2022   Component Date Value Ref Range Status    Glucose 11/08/2022 114 (H)  70 - 99 mg/dL Final    BUN 11/08/2022 8  8 - 27 mg/dL Final    Creatinine 11/08/2022 1.19  0.76 - 1.27 mg/dL Final    EGFR Result 11/08/2022 63  >59 mL/min/1.73 Final    BUN/Creatinine Ratio 11/08/2022 7 (L)  10 - 24 Final    Sodium 11/08/2022 139  134 - 144 mmol/L Final    Potassium 11/08/2022 4.0  3.5 - 5.2 mmol/L Final    Chloride 11/08/2022 103  96 - 106 mmol/L Final    Total CO2 11/08/2022 22  20 - 29 mmol/L Final    Calcium 11/08/2022 10.0  8.6 - 10.2 mg/dL Final    Total Protein 11/08/2022 7.0  6.0 - 8.5 g/dL Final    Albumin 11/08/2022 4.8 (H)  3.7 - 4.7 g/dL Final    Globulin 11/08/2022 2.2  1.5 - 4.5 g/dL Final    A/G Ratio 11/08/2022 2.2  1.2 - 2.2 Final    Total Bilirubin 11/08/2022 0.2  0.0 - 1.2 mg/dL Final    Alkaline Phosphatase 11/08/2022 37 (L)  44 - 121 IU/L Final    AST (SGOT) 11/08/2022 15  0 - 40 IU/L Final    ALT (SGPT) 11/08/2022 5  0 - 44 IU/L Final    WBC 11/08/2022 6.4  3.4 - 10.8 x10E3/uL Final    RBC 11/08/2022 4.04 (L)  4.14 - 5.80 x10E6/uL Final    Hemoglobin 11/08/2022 12.3 (L)  13.0 - 17.7 g/dL Final    Hematocrit 11/08/2022 36.1 (L)  37.5 - 51.0 % Final    MCV 11/08/2022 89  79 - 97 fL Final    MCH 11/08/2022 30.4  26.6 - 33.0 pg Final    MCHC 11/08/2022 34.1  31.5 - 35.7 g/dL Final    RDW 11/08/2022 12.6  11.6 - 15.4 % Final    Platelets 11/08/2022 253  150 - 450 x10E3/uL Final    Neutrophil Rel % 11/08/2022 66  Not Estab. % Final    Lymphocyte Rel % 11/08/2022 23  Not Estab. % Final    Monocyte Rel % 11/08/2022 8  Not Estab. % Final    Eosinophil Rel % 11/08/2022 2  Not Estab. % Final    Basophil Rel % 11/08/2022 1  Not Estab. % Final    Neutrophils Absolute 11/08/2022 4.2  1.4 - 7.0 x10E3/uL Final    Lymphocytes Absolute 11/08/2022 1.5  0.7 - 3.1 x10E3/uL Final    Monocytes Absolute 11/08/2022 0.5  0.1 - 0.9 x10E3/uL  Final    Eosinophils Absolute 11/08/2022 0.1  0.0 - 0.4 x10E3/uL Final    Basophils Absolute 11/08/2022 0.1  0.0 - 0.2 x10E3/uL Final    Immature Granulocyte Rel % 11/08/2022 0  Not Estab. % Final    Immature Grans Absolute 11/08/2022 0.0  0.0 - 0.1 x10E3/uL Final    Total Cholesterol 11/08/2022 153  100 - 199 mg/dL Final    Triglycerides 11/08/2022 252 (H)  0 - 149 mg/dL Final    HDL Cholesterol 11/08/2022 41  >39 mg/dL Final    VLDL Cholesterol Alcon 11/08/2022 41 (H)  5 - 40 mg/dL Final    LDL Chol Calc (NIH) 11/08/2022 71  0 - 99 mg/dL Final    TIBC 11/08/2022 310  250 - 450 ug/dL Final    UIBC 11/08/2022 208  111 - 343 ug/dL Final    Iron 11/08/2022 102  38 - 169 ug/dL Final    Iron Saturation 11/08/2022 33  15 - 55 % Final             Assessment & Plan   Diagnoses and all orders for this visit:    1. Bronchitis (Primary)    2. Acute cough  -     XR Chest PA & Lateral; Future    3. SOB (shortness of breath)  -     azithromycin (Zithromax Z-Rob) 250 MG tablet; Take 2 tablets by mouth on day 1, then 1 tablet daily on days 2-5  Dispense: 6 tablet; Refill: 0  -     predniSONE (DELTASONE) 20 MG tablet; 3 per day x 3 days. 2 per day x 3 days, 1 per day x 2 days, then 1/2 per day x 4 day  Dispense: 19 tablet; Refill: 0    We did a chest x-ray in the office today.  I do not see any obvious infiltrate.  We will await radiology official reading.  I think he probably has bronchitis.  Lungs did not sound too bad.  Vital signs were all okay.  We will treat him with a course of azithromycin.  He has had very good experiences with this in the past.  We will also give him a burst of steroids and taper it since he has shortness of breath.  He is not having any chest pains, PND, orthopnea.  Nothing that sounds cardiac.  No swelling in his legs.  He started taking Mucinex a few days ago.  We will have him continue to do that for a few more days.  I will follow-up with him when the official chest x-ray report is back.  Let me know  if symptoms worsen or fail to improve as expected.  Keep follow-up as planned next month        Call with any problems or concerns before next visit       Return if symptoms worsen or fail to improve.      Much of this report is an electronic transcription of spoken language to printed text using Dragon dictation software.  As such, the subtleties and finesse of spoken language may permit erroneous, or at times, nonsensical words or phrases to be inadvertently transcribed; thus changes may be made at a later date to rectify these errors.     Aaliyah Hearn MD10/25/065863:13 EDT  This note has been electronically signed

## 2023-10-25 NOTE — TELEPHONE ENCOUNTER
"Hub- He thinks he has a lung infection. It started on Monday. He is short of breath at rest, coughing up yellow mucus. He would like an office visit time. Calling the back line for [place] office- [phone]- called the back line speaking with Pam. Advised urgent care- No openings this week  declined ER and triage. Does not desire urgent care, unsure what the caller plans will be.  Reason for Disposition   Caller requesting an appointment, triage offered and declined    Additional Information   Negative: Lab calling with strep throat test results and triager can call in prescription   Negative: Lab calling with urinalysis test results and triager can call in prescription   Negative: Medication questions   Negative: Medication renewal and refill questions   Negative: Pre-operative or pre-procedural questions   Negative: ED call to PCP (i.e., primary care provider; doctor, NP, or PA)   Negative: Doctor (or NP/PA) call to PCP   Negative: Call about patient who is currently hospitalized   Negative: Lab or radiology calling with CRITICAL test results   Negative: [1] Follow-up call from patient regarding patient's clinical status AND [2] information urgent   Negative: [1] Caller requests to speak ONLY to PCP AND [2] URGENT question   Negative: [1] Caller requests to speak to PCP now AND [2] won't tell us reason for call  (Exception: If 10 pm to 6 am, caller must first discuss reason for the call.)   Negative: Notification of hospital admission   Negative: Notification of death   Negative: Caller requesting lab results  (Exception: Routine or non-urgent lab result.)   Negative: Lab or radiology calling with test results   Negative: [1] Follow-up call from patient regarding patient's clinical status AND [2] information NON-URGENT   Negative: [1] Caller requests to speak ONLY to PCP AND [2] NON-URGENT question    Answer Assessment - Initial Assessment Questions  1. REASON FOR CALL or QUESTION: \"What is your reason for " "calling today?\" or \"How can I best  help you?\" or \"What question do you have that I can help answer?\"      Office visit for lung infection   2. CALLER: Document the source of call. (e.g., laboratory, patient).      Patient.    Protocols used: PCP Call - No Triage-ADULT-    "

## 2023-10-25 NOTE — TELEPHONE ENCOUNTER
Hub- He thinks he has a lung infection. It started on Monday. He is short of breath at rest, coughing up yellow mucus. He would like an office visit time. Calling the back line for [place] office- [phone]- called the back line speaking with Pam. Advised urgent care- No openings this week  declined ER and triage. Does not desire urgent care, unsure what the caller plans will be.

## 2023-11-03 NOTE — PROGRESS NOTES
Date of Office Visit: 2023  Encounter Provider: Dr. Darian Fajardo  Place of Service: [facility] CARDIOLOGY [place]  Patient Name: Chong Manuel  :1945  Aaliyah Hearn MD    Chief Complaint   Patient presents with    Hypertension    Hyperlipidemia    Follow-up     History of Present Illness    I am pleased to see Mr. Manuel in my office today as a follow-up    As you know, patient is 78 years old white gentleman whose past medical history is significant for hypertension, hyperlipidemia, who came today for follow-up.    In 2021, patient was presented to me for shortness of breath.  Patient underwent echocardiogram which showed normal left ventricle size and function with LVEF of 55 to 60%.  Severe aortic stenosis noted with peak gradient of 100 mmHg.  Patient underwent RAFAT which confirmed findings of severe aortic stenosis.  Subsequent cardiac catheterization showed no significant CAD and transvalvular gradient on cath was 38 mm mean.  Aortic valve area was 0.8 cm².  Patient underwent AVR and did well.    Patient came today and overall doing well.  He brought his blood pressure logbook and all the blood pressure readings are below 140 mmHg.  Patient denies any symptom of chest pain or tightness or heaviness.  No orthopnea.  No syncope or presyncope noted.  No palpitation.    EKG showed sinus rhythm patient has incomplete right bundle branch block.    At this stage I am overall pleased with the patient progress his blood pressure is controlled with addition of hydralazine blood pressure is within desirable range please level of activity repeat echocardiogram next year      Past Medical History:   Diagnosis Date    Anemia     GERD (gastroesophageal reflux disease)     History of transfusion     Hyperlipidemia     Hypertension     Lung nodules     left     Shortness of breath 2021    Stroke          Past Surgical History:   Procedure Laterality Date    AORTIC VALVE  REPAIR/REPLACEMENT N/A 8/5/2021    Procedure: AORTIC VALVE REPAIR/REPLACEMENT;  Surgeon: Nael Padilla MD;  Location: [facility] CVOR;  Service: Cardiothoracic;  Laterality: N/A;    CARDIAC CATHETERIZATION N/A 4/27/2021    Procedure: Left Heart Cath;  Surgeon: Darian Fajardo MD;  Location: [facility] CATH INVASIVE LOCATION;  Service: Cardiovascular;  Laterality: N/A;    CARDIAC CATHETERIZATION N/A 4/27/2021    Procedure: Right Heart Cath;  Surgeon: Darian Fajardo MD;  Location: [facility] CATH INVASIVE LOCATION;  Service: Cardiovascular;  Laterality: N/A;    COLONOSCOPY      COLONOSCOPY N/A 7/27/2021    Procedure: COLONOSCOPY with polypectomy;  Surgeon: CIRA Pope MD;  Location: [facility] ENDOSCOPY;  Service: Gastroenterology;  Laterality: N/A;  post op: diverticulosis, polyp    ENDOSCOPY N/A 7/26/2021    Procedure: ESOPHAGOGASTRODUODENOSCOPY;  Surgeon: CIRA Pope MD;  Location: [facility] ENDOSCOPY;  Service: Gastroenterology;  Laterality: N/A;  HH, gastric polyps    HERNIA REPAIR             Current Outpatient Medications:     acetaminophen (TYLENOL) 325 MG tablet, Take 2 tablets by mouth Every 4 (Four) Hours As Needed for Mild Pain ., Disp: , Rfl:     aspirin 81 MG EC tablet, Take 1 tablet by mouth Daily., Disp: 30 tablet, Rfl: 3    hydrALAZINE (APRESOLINE) 25 MG tablet, Take 1 tablet by mouth 2 (Two) Times a Day., Disp: 180 tablet, Rfl: 3    losartan (Cozaar) 50 MG tablet, Take 1 tablet by mouth 2 (Two) Times a Day., Disp: 180 tablet, Rfl: 3    pantoprazole (PROTONIX) 40 MG EC tablet, Take 1 tablet by mouth Daily., Disp: 90 tablet, Rfl: 3    simvastatin (ZOCOR) 40 MG tablet, Take 1 tablet by mouth Every Night., Disp: 90 tablet, Rfl: 3    terazosin (HYTRIN) 5 MG capsule, Take 1 capsule by mouth Every Night., Disp: 90 capsule, Rfl: 3      Social History     Socioeconomic History    Marital status:    Tobacco Use    Smoking status: Former     Packs/day: 1.50     Years: 54.00     Additional pack years: 0.00  "    Total pack years: 81.00     Types: Cigarettes     Start date:      Quit date: 2011     Years since quittin.3     Passive exposure: Past    Smokeless tobacco: Never   Vaping Use    Vaping Use: Never used   Substance and Sexual Activity    Alcohol use: Not Currently     Comment: quit in     Drug use: No    Sexual activity: Not Currently     Partners: Female         Review of Systems   Constitutional: Negative for chills and fever.   HENT:  Negative for ear discharge and nosebleeds.    Eyes:  Negative for discharge and redness.   Cardiovascular:  Negative for chest pain, orthopnea, palpitations, paroxysmal nocturnal dyspnea and syncope.   Respiratory:  Positive for shortness of breath. Negative for cough and wheezing.    Endocrine: Negative for heat intolerance.   Skin:  Negative for rash.   Musculoskeletal:  Negative for arthritis and myalgias.   Gastrointestinal:  Negative for abdominal pain, melena, nausea and vomiting.   Genitourinary:  Negative for dysuria and hematuria.   Neurological:  Negative for dizziness, light-headedness, numbness and tremors.   Psychiatric/Behavioral:  Negative for depression. The patient is not nervous/anxious.        Procedures    Procedures    No orders to display           Objective:    /76 (BP Location: Right arm, Patient Position: Sitting, Cuff Size: Large Adult)   Pulse 68   Resp 16   Ht 167.6 cm (65.98\")   Wt 75.8 kg (167 lb)   SpO2 98%   BMI 26.97 kg/m²         Constitutional:       Appearance: Well-developed.   Eyes:      General: No scleral icterus.        Right eye: No discharge.   HENT:      Head: Normocephalic and atraumatic.   Neck:      Thyroid: No thyromegaly.      Lymphadenopathy: No cervical adenopathy.   Pulmonary:      Effort: Pulmonary effort is normal. No respiratory distress.      Breath sounds: Normal breath sounds. No wheezing. No rales.   Cardiovascular:      Normal rate. Regular rhythm.      No gallop.    Edema:     Peripheral " edema absent.   Abdominal:      Tenderness: There is no abdominal tenderness.   Skin:     Findings: No erythema or rash.   Neurological:      Mental Status: Alert and oriented to person, place, and time.             Assessment:       Diagnosis Plan   1. Cerebrovascular accident (CVA), unspecified mechanism        2. Essential hypertension        3. Mixed hyperlipidemia        4. Aortic stenosis, severe                 Plan:       MDM:    1.  S/p AVR:    Repeat echocardiogram on next visit    2.  Hypertension:    Blood pressure is within desirable range.  Continue to monitor.  Patient is on hydralazine and losartan    3.  Hyperlipidemia:    Patient is on simvastatin repeat lipid panel testing.    4.  CVA:    Patient carries a diagnosis of CVA but currently no deficit

## 2023-11-06 ENCOUNTER — OFFICE VISIT (OUTPATIENT)
Dept: CARDIOLOGY | Facility: CLINIC | Age: 78
End: 2023-11-06
Payer: MEDICARE

## 2023-11-06 VITALS
BODY MASS INDEX: 26.84 KG/M2 | DIASTOLIC BLOOD PRESSURE: 80 MMHG | HEART RATE: 68 BPM | WEIGHT: 167 LBS | SYSTOLIC BLOOD PRESSURE: 139 MMHG | OXYGEN SATURATION: 98 % | HEIGHT: 66 IN | RESPIRATION RATE: 16 BRPM

## 2023-11-06 DIAGNOSIS — E78.2 MIXED HYPERLIPIDEMIA: ICD-10-CM

## 2023-11-06 DIAGNOSIS — I63.9 CEREBROVASCULAR ACCIDENT (CVA), UNSPECIFIED MECHANISM: Primary | Chronic | ICD-10-CM

## 2023-11-06 DIAGNOSIS — I35.0 AORTIC STENOSIS, SEVERE: ICD-10-CM

## 2023-11-06 DIAGNOSIS — I10 ESSENTIAL HYPERTENSION: ICD-10-CM

## 2023-11-06 PROCEDURE — 1159F MED LIST DOCD IN RCRD: CPT | Performed by: INTERNAL MEDICINE

## 2023-11-06 PROCEDURE — 3075F SYST BP GE 130 - 139MM HG: CPT | Performed by: INTERNAL MEDICINE

## 2023-11-06 PROCEDURE — 1160F RVW MEDS BY RX/DR IN RCRD: CPT | Performed by: INTERNAL MEDICINE

## 2023-11-06 PROCEDURE — 3079F DIAST BP 80-89 MM HG: CPT | Performed by: INTERNAL MEDICINE

## 2023-11-06 PROCEDURE — 99214 OFFICE O/P EST MOD 30 MIN: CPT | Performed by: INTERNAL MEDICINE

## 2023-11-19 NOTE — PROGRESS NOTES
The ABCs of the Annual Wellness Visit  [facility]    Subjective     Chong Manuel is a 78 y.o. male who presents for a  Welcome to Medicare Visit.    The following portions of the patient's history were reviewed and   updated as appropriate: allergies, current medications, past family history, past medical history, past social history, past surgical history, and problem list.     Compared to one year ago, the patient feels his physical   health is the same.    Compared to one year ago, the patient feels his mental   health is the same.    Recent Hospitalizations:  He was not admitted to the hospital during the last year.       Current Medical Providers:  Patient Care Team:  Aaliyah Hearn MD as PCP - General (Family Medicine)    Outpatient Medications Prior to Visit   Medication Sig Dispense Refill    acetaminophen (TYLENOL) 325 MG tablet Take 2 tablets by mouth Every 4 (Four) Hours As Needed for Mild Pain .      aspirin 81 MG EC tablet Take 1 tablet by mouth Daily. 30 tablet 3    hydrALAZINE (APRESOLINE) 25 MG tablet Take 1 tablet by mouth 2 (Two) Times a Day. 180 tablet 3    losartan (Cozaar) 50 MG tablet Take 1 tablet by mouth 2 (Two) Times a Day. 180 tablet 3    pantoprazole (PROTONIX) 40 MG EC tablet Take 1 tablet by mouth Daily. 90 tablet 3    simvastatin (ZOCOR) 40 MG tablet Take 1 tablet by mouth Every Night. 90 tablet 3    terazosin (HYTRIN) 5 MG capsule Take 1 capsule by mouth Every Night. 90 capsule 3     No facility-administered medications prior to visit.       No opioid medication identified on active medication list. I have reviewed chart for other potential  high risk medication/s and harmful drug interactions in the elderly.        Aspirin is on active medication list. Aspirin use is indicated based on review of current medical condition/s. Pros and cons of this therapy have been discussed today. Benefits of this medication outweigh potential harm.  Patient has been  "encouraged to continue taking this medication.  .      Patient Active Problem List   Diagnosis    Acute on chronic blood loss anemia    Essential hypertension    Mixed hyperlipidemia    Chronic GERD    CVA (cerebral vascular accident)    Iron deficiency anemia    Pure hypercholesterolemia    Solitary pulmonary nodule    Shortness of breath    Chest discomfort    Aortic stenosis, severe    GI bleed    S/P AVR (tissue) by Dr. Padilla 2021    BPH with obstruction/lower urinary tract symptoms    Primary osteoarthritis involving multiple joints    Pneumonia of both lower lobes due to infectious organism    Acute cough    SOB (shortness of breath)    White coat syndrome with diagnosis of hypertension     Advance Care Planning   Advance Care Planning     Advance Directive is not on file.  ACP discussion was held with the patient during this visit. Patient does not have an advance directive, declines further assistance.       Objective   Vitals:    23 1109   BP: 173/82   BP Location: Right arm   Patient Position: Sitting   Cuff Size: Adult   Pulse: 65   Resp: 18   Temp: 97.8 °F (36.6 °C)   TempSrc: Infrared   SpO2: 94%   Weight: 76.2 kg (168 lb)   Height: 167.6 cm (65.98\")     Estimated body mass index is 27.13 kg/m² as calculated from the following:    Height as of this encounter: 167.6 cm (65.98\").    Weight as of this encounter: 76.2 kg (168 lb).           Does the patient have evidence of cognitive impairment?   No    Lab Results   Component Value Date    CHLPL 135 2023    TRIG 283 (H) 2023    HDL 40 2023    LDL 51 2023    VLDL 44 (H) 2023       Procedures       HEALTH RISK ASSESSMENT    Smoking Status:  Social History     Tobacco Use   Smoking Status Former    Packs/day: 1.50    Years: 50.00    Additional pack years: 0.00    Total pack years: 75.00    Types: Cigarettes    Start date:     Quit date: 2011    Years since quittin.4    Passive exposure: Past   Smokeless " Tobacco Never     Alcohol Consumption:  Social History     Substance and Sexual Activity   Alcohol Use Not Currently    Comment: quit in        Fall Risk Screen:    ADRIANOSATNAM Fall Risk Assessment was completed, and patient is at LOW risk for falls.Assessment completed on:2023    Depression Screen:       2023    11:06 AM   PHQ-2/PHQ-9 Depression Screening   Little Interest or Pleasure in Doing Things 0-->not at all   Feeling Down, Depressed or Hopeless 0-->not at all   PHQ-9: Brief Depression Severity Measure Score 0       Health Habits and Functional and Cognitive Screenin/22/2023    11:07 AM   Functional & Cognitive Status   Do you have difficulty preparing food and eating? No   Do you have difficulty bathing yourself, getting dressed or grooming yourself? No   Do you have difficulty using the toilet? No   Do you have difficulty moving around from place to place? No   Do you have trouble with steps or getting out of a bed or a chair? No   Current Diet Unhealthy Diet   Dental Exam Not up to date   Eye Exam Up to date   Exercise (times per week) 7 times per week   Current Exercises Include Yard Work;House Cleaning   Do you need help using the phone?  No   Are you deaf or do you have serious difficulty hearing?  Yes   Do you need help to go to places out of walking distance? No   Do you need help shopping? No   Do you need help preparing meals?  No   Do you need help with housework?  No   Do you need help with laundry? No   Do you need help taking your medications? No   Do you need help managing money? No   Do you ever drive or ride in a car without wearing a seat belt? No   Have you felt unusual stress, anger or loneliness in the last month? No   Who do you live with? Alone   If you need help, do you have trouble finding someone available to you? No   Have you been bothered in the last four weeks by sexual problems? No   Do you have difficulty concentrating, remembering or making decisions? Yes        Visual Acuity:    No results found.    Age-appropriate Screening Schedule:  Refer to the list below for future screening recommendations based on patient's age, sex and/or medical conditions. Orders for these recommended tests are listed in the plan section. The patient has been provided with a written plan.    Health Maintenance   Topic Date Due    HEPATITIS C SCREENING  Never done    ANNUAL WELLNESS VISIT  Never done    TDAP/TD VACCINES (2 - Tdap) 11/04/2020    COVID-19 Vaccine (5 - 2023-24 season) 09/01/2023    ZOSTER VACCINE (2 of 2) 12/05/2023    BMI FOLLOWUP  02/13/2024    LIPID PANEL  11/17/2024    INFLUENZA VACCINE  Completed    Pneumococcal Vaccine 65+  Completed        CMS Preventative Services Quick Reference  Risk Factors Identified During Encounter    None Identified  The above risks/problems have been discussed with the patient.  Pertinent information has been shared with the patient in the After Visit Summary.    Follow Up:   Initial Medicare Visit in one year    An After Visit Summary and PPPS were made available to the patient.      Additional E&M Note during same encounter follows:  Patient has multiple medical problems which are significant and separately identifiable that require additional work above and beyond the Medicare Wellness Visit.      Chief Complaint  Medicare Wellness-subsequent, Hypertension, and Hyperlipidemia    Subjective        HPI  Chong Manuel is also being seen today for reevaluation of chronic problems including hypertension, high cholesterol, GERD with history of GI bleed resulting in iron deficiency anemia, history of stroke.  He had lab work done a few days ago.  CBC remained stable with a hemoglobin of 11.7.  Platelet count was normal at 207,000.  Lipid panel showed total cholesterol 135, LDL 51, HDL 40.  CMP showed glucose of 109, normal GFR of 68, normal electrolytes, normal LFTs.  B/P this AM - 138 and on recheck 123.  Denies any side effects of his  "medications.    New complaint today is that of both ears hurting.  He had bronchitis about a month ago.  Still has a little bit of a cough from time to time.  He does get a little short of breath with activity and asks for refill on albuterol inhaler to have when that occurs.  Tells me he thinks there is a little blood mixed in with the mucus when he coughs it out.  He tells me it is draining down the back of his throat, not coming from his lungs.  No gross epistaxis  No shortness of breath at rest.  No PND, no orthopnea.  No black stool.  No abnormal bleeding.           Objective   Vital Signs:  /82 (BP Location: Right arm, Patient Position: Sitting, Cuff Size: Adult)   Pulse 65   Temp 97.8 °F (36.6 °C) (Infrared)   Resp 18   Ht 167.6 cm (65.98\")   Wt 76.2 kg (168 lb)   SpO2 94%   BMI 27.13 kg/m²     Physical Exam  Constitutional:       Appearance: He is well-developed. He is not ill-appearing.      Comments: Elderly white male-looks younger than known age of 78 years.     HENT:      Head: Normocephalic and atraumatic.      Ears:      Comments: Both TMs are dull with air-fluid levels behind.  Eyes:      Conjunctiva/sclera: Conjunctivae normal.   Cardiovascular:      Rate and Rhythm: Normal rate and regular rhythm.      Heart sounds: No murmur heard.  Pulmonary:      Effort: Pulmonary effort is normal. No accessory muscle usage or respiratory distress.      Breath sounds: Decreased breath sounds present. No wheezing, rhonchi or rales.   Musculoskeletal:         General: Normal range of motion.      Cervical back: Normal range of motion.      Right lower leg: No edema.      Left lower leg: No edema.   Skin:     General: Skin is warm and dry.      Coloration: Skin is not pale.      Findings: No rash.   Neurological:      Mental Status: He is alert and oriented to person, place, and time.      Gait: Gait normal.   Psychiatric:         Mood and Affect: Mood normal.         Behavior: Behavior normal.      "                 Assessment and Plan   Diagnoses and all orders for this visit:    1. Essential hypertension (Primary)  -     hydrALAZINE (APRESOLINE) 25 MG tablet; Take 1 tablet by mouth 2 (Two) Times a Day.  Dispense: 180 tablet; Refill: 3  -     losartan (Cozaar) 50 MG tablet; Take 1 tablet by mouth 2 (Two) Times a Day.  Dispense: 180 tablet; Refill: 3    2. Mixed hyperlipidemia  -     hydrALAZINE (APRESOLINE) 25 MG tablet; Take 1 tablet by mouth 2 (Two) Times a Day.  Dispense: 180 tablet; Refill: 3  -     simvastatin (ZOCOR) 40 MG tablet; Take 1 tablet by mouth Every Night.  Dispense: 90 tablet; Refill: 3    3. White coat syndrome with diagnosis of hypertension    4. Chronic GERD    5. Acute on chronic blood loss anemia  -     pantoprazole (PROTONIX) 40 MG EC tablet; Take 1 tablet by mouth Daily.  Dispense: 90 tablet; Refill: 3    6. Iron deficiency anemia due to chronic blood loss    7. Cerebrovascular accident (CVA), unspecified mechanism  -     hydrALAZINE (APRESOLINE) 25 MG tablet; Take 1 tablet by mouth 2 (Two) Times a Day.  Dispense: 180 tablet; Refill: 3  -     losartan (Cozaar) 50 MG tablet; Take 1 tablet by mouth 2 (Two) Times a Day.  Dispense: 180 tablet; Refill: 3    8. Shortness of breath  -     albuterol sulfate  (90 Base) MCG/ACT inhaler; Inhale 2 puffs Every 4 (Four) Hours As Needed for Wheezing.  Dispense: 18 g; Refill: 2  -     losartan (Cozaar) 50 MG tablet; Take 1 tablet by mouth 2 (Two) Times a Day.  Dispense: 180 tablet; Refill: 3    9. Non-recurrent acute serous otitis media of both ears  -     clarithromycin (BIAXIN) 500 MG tablet; Take 1 tablet by mouth 2 (Two) Times a Day. Skip simvastatin when on this  Dispense: 14 tablet; Refill: 7    10. Post-nasal drip    11. Aortic stenosis, severe  -     hydrALAZINE (APRESOLINE) 25 MG tablet; Take 1 tablet by mouth 2 (Two) Times a Day.  Dispense: 180 tablet; Refill: 3  -     losartan (Cozaar) 50 MG tablet; Take 1 tablet by mouth 2 (Two) Times  a Day.  Dispense: 180 tablet; Refill: 3    12. S/P AVR (tissue) by Dr. Padilla 8/5/2021  -     hydrALAZINE (APRESOLINE) 25 MG tablet; Take 1 tablet by mouth 2 (Two) Times a Day.  Dispense: 180 tablet; Refill: 3    13. BPH with obstruction/lower urinary tract symptoms  -     terazosin (HYTRIN) 5 MG capsule; Take 1 capsule by mouth Every Night.  Dispense: 90 capsule; Refill: 3    In addition to age-appropriate preventive care discussed above, we reviewed status of multiple chronic medical problems.  BPH symptoms are stable.  Continue Hytrin at current dose.  Refill that today.  Hypertension is a chronic problem which is under good control based on numbers at home.  He does have an element of whitecoat syndrome.  Refill hydralazine, losartan at current doses.  No signs of decompensated heart failure or suspicion of aortic valve failure.  Will give him an inhaler to use when he gets short of breath with exertion.  Will treat otitis media with Biaxin.  It does not sound like he is having hemoptysis.  It sounds like he is getting some blood streaking in the mucus which is draining.  This would be more consistent with sinusitis.  All the more reason to cover him with Biaxin.  Let me know if the bleeding in the mucus does not resolve.  Continue pantoprazole due to history of GERD with gastritis and history of GI bleeding.  Asymptomatic with regard to that.  Keep follow-up with cardiology per their recommendations.  Follow-up here in 6 months or sooner if needed.         Follow Up   Return in about 6 months (around 5/22/2024).  Patient was given instructions and counseling regarding his condition or for health maintenance advice. Please see specific information pulled into the AVS if appropriate.              (2) cough or sneeze

## 2023-11-22 ENCOUNTER — OFFICE VISIT (OUTPATIENT)
Dept: FAMILY MEDICINE CLINIC | Facility: CLINIC | Age: 78
End: 2023-11-22
Payer: MEDICARE

## 2023-11-22 VITALS
BODY MASS INDEX: 27 KG/M2 | SYSTOLIC BLOOD PRESSURE: 173 MMHG | WEIGHT: 168 LBS | HEIGHT: 66 IN | DIASTOLIC BLOOD PRESSURE: 82 MMHG | RESPIRATION RATE: 18 BRPM | HEART RATE: 65 BPM | OXYGEN SATURATION: 94 % | TEMPERATURE: 97.8 F

## 2023-11-22 DIAGNOSIS — I10 ESSENTIAL HYPERTENSION: Primary | ICD-10-CM

## 2023-11-22 DIAGNOSIS — R09.82 POST-NASAL DRIP: ICD-10-CM

## 2023-11-22 DIAGNOSIS — N40.1 BPH WITH OBSTRUCTION/LOWER URINARY TRACT SYMPTOMS: ICD-10-CM

## 2023-11-22 DIAGNOSIS — I63.9 CEREBROVASCULAR ACCIDENT (CVA), UNSPECIFIED MECHANISM: Chronic | ICD-10-CM

## 2023-11-22 DIAGNOSIS — K21.9 CHRONIC GERD: ICD-10-CM

## 2023-11-22 DIAGNOSIS — I35.0 AORTIC STENOSIS, SEVERE: ICD-10-CM

## 2023-11-22 DIAGNOSIS — D50.0 IRON DEFICIENCY ANEMIA DUE TO CHRONIC BLOOD LOSS: ICD-10-CM

## 2023-11-22 DIAGNOSIS — D62 ACUTE ON CHRONIC BLOOD LOSS ANEMIA: ICD-10-CM

## 2023-11-22 DIAGNOSIS — Z95.2 S/P AVR: ICD-10-CM

## 2023-11-22 DIAGNOSIS — N13.8 BPH WITH OBSTRUCTION/LOWER URINARY TRACT SYMPTOMS: ICD-10-CM

## 2023-11-22 DIAGNOSIS — I10 WHITE COAT SYNDROME WITH DIAGNOSIS OF HYPERTENSION: ICD-10-CM

## 2023-11-22 DIAGNOSIS — E78.2 MIXED HYPERLIPIDEMIA: ICD-10-CM

## 2023-11-22 DIAGNOSIS — R06.02 SHORTNESS OF BREATH: ICD-10-CM

## 2023-11-22 DIAGNOSIS — H65.03 NON-RECURRENT ACUTE SEROUS OTITIS MEDIA OF BOTH EARS: ICD-10-CM

## 2023-11-22 RX ORDER — LOSARTAN POTASSIUM 50 MG/1
50 TABLET ORAL 2 TIMES DAILY
Qty: 180 TABLET | Refills: 3 | Status: SHIPPED | OUTPATIENT
Start: 2023-11-22

## 2023-11-22 RX ORDER — ALBUTEROL SULFATE 90 UG/1
2 AEROSOL, METERED RESPIRATORY (INHALATION) EVERY 4 HOURS PRN
Qty: 18 G | Refills: 2 | Status: SHIPPED | OUTPATIENT
Start: 2023-11-22

## 2023-11-22 RX ORDER — PANTOPRAZOLE SODIUM 40 MG/1
40 TABLET, DELAYED RELEASE ORAL DAILY
Qty: 90 TABLET | Refills: 3 | Status: SHIPPED | OUTPATIENT
Start: 2023-11-22

## 2023-11-22 RX ORDER — CLARITHROMYCIN 500 MG/1
500 TABLET, COATED ORAL 2 TIMES DAILY
Qty: 14 TABLET | Refills: 7 | Status: SHIPPED | OUTPATIENT
Start: 2023-11-22

## 2023-11-22 RX ORDER — TERAZOSIN 5 MG/1
5 CAPSULE ORAL NIGHTLY
Qty: 90 CAPSULE | Refills: 3 | Status: SHIPPED | OUTPATIENT
Start: 2023-11-22

## 2023-11-22 RX ORDER — HYDRALAZINE HYDROCHLORIDE 25 MG/1
25 TABLET, FILM COATED ORAL 2 TIMES DAILY
Qty: 180 TABLET | Refills: 3 | Status: SHIPPED | OUTPATIENT
Start: 2023-11-22

## 2023-11-22 RX ORDER — SIMVASTATIN 40 MG
40 TABLET ORAL NIGHTLY
Qty: 90 TABLET | Refills: 3 | Status: SHIPPED | OUTPATIENT
Start: 2023-11-22

## 2024-05-22 ENCOUNTER — OFFICE VISIT (OUTPATIENT)
Dept: FAMILY MEDICINE CLINIC | Facility: CLINIC | Age: 79
End: 2024-05-22
Payer: MEDICARE

## 2024-05-22 VITALS
BODY MASS INDEX: 27.51 KG/M2 | HEIGHT: 66 IN | OXYGEN SATURATION: 91 % | WEIGHT: 171.2 LBS | HEART RATE: 68 BPM | DIASTOLIC BLOOD PRESSURE: 79 MMHG | TEMPERATURE: 97.7 F | RESPIRATION RATE: 18 BRPM | SYSTOLIC BLOOD PRESSURE: 151 MMHG

## 2024-05-22 DIAGNOSIS — Z95.2 S/P AVR: ICD-10-CM

## 2024-05-22 DIAGNOSIS — N13.8 BPH WITH OBSTRUCTION/LOWER URINARY TRACT SYMPTOMS: ICD-10-CM

## 2024-05-22 DIAGNOSIS — I10 WHITE COAT SYNDROME WITH DIAGNOSIS OF HYPERTENSION: ICD-10-CM

## 2024-05-22 DIAGNOSIS — N40.1 BPH WITH OBSTRUCTION/LOWER URINARY TRACT SYMPTOMS: ICD-10-CM

## 2024-05-22 DIAGNOSIS — I63.9 CEREBROVASCULAR ACCIDENT (CVA), UNSPECIFIED MECHANISM: Chronic | ICD-10-CM

## 2024-05-22 DIAGNOSIS — E78.2 MIXED HYPERLIPIDEMIA: ICD-10-CM

## 2024-05-22 DIAGNOSIS — I10 ESSENTIAL HYPERTENSION: Primary | ICD-10-CM

## 2024-05-22 DIAGNOSIS — I35.0 AORTIC STENOSIS, SEVERE: ICD-10-CM

## 2024-05-22 DIAGNOSIS — H69.93 DYSFUNCTION OF BOTH EUSTACHIAN TUBES: ICD-10-CM

## 2024-05-22 DIAGNOSIS — D50.0 IRON DEFICIENCY ANEMIA DUE TO CHRONIC BLOOD LOSS: ICD-10-CM

## 2024-05-22 PROCEDURE — 3078F DIAST BP <80 MM HG: CPT | Performed by: FAMILY MEDICINE

## 2024-05-22 PROCEDURE — 1160F RVW MEDS BY RX/DR IN RCRD: CPT | Performed by: FAMILY MEDICINE

## 2024-05-22 PROCEDURE — 1159F MED LIST DOCD IN RCRD: CPT | Performed by: FAMILY MEDICINE

## 2024-05-22 PROCEDURE — 99214 OFFICE O/P EST MOD 30 MIN: CPT | Performed by: FAMILY MEDICINE

## 2024-05-22 PROCEDURE — 3077F SYST BP >= 140 MM HG: CPT | Performed by: FAMILY MEDICINE

## 2024-05-22 PROCEDURE — 1126F AMNT PAIN NOTED NONE PRSNT: CPT | Performed by: FAMILY MEDICINE

## 2024-05-22 RX ORDER — TERAZOSIN 5 MG/1
5 CAPSULE ORAL NIGHTLY
Qty: 90 CAPSULE | Refills: 3 | Status: SHIPPED | OUTPATIENT
Start: 2024-05-22

## 2024-05-22 RX ORDER — FLUTICASONE PROPIONATE 50 MCG
2 SPRAY, SUSPENSION (ML) NASAL DAILY
Qty: 18.2 ML | Refills: 5 | Status: SHIPPED | OUTPATIENT
Start: 2024-05-22

## 2024-05-22 NOTE — PROGRESS NOTES
Subjective   Chong Manuel is a 79 y.o. male.   Chief Complaint   Patient presents with    Hypertension    Hyperlipidemia       History of Present Illness   79 y.o. male with PMH of stroke, aortic stenosis status post tissue AVR in 2021, HTN, HLD, GERD, HLD, and history of GI bleed presents to the office today for follow-up.  I last saw him in November for a wellness.    He brings a list of home blood pressure readings.  Averaging in the high 120 range.  Blood pressure in the office today is 151/79.    He has no current complaints of chest pain, shortness of breath at rest.  Denies any side effects of medications.  Needs a refill on terazosin and Flonase.    No other complaints today either.    Patient Active Problem List    Diagnosis Date Noted    White coat syndrome with diagnosis of hypertension 05/15/2023    Pneumonia of both lower lobes due to infectious organism 04/20/2023    Acute cough 04/20/2023    SOB (shortness of breath) 04/20/2023    Primary osteoarthritis involving multiple joints 02/13/2023    BPH with obstruction/lower urinary tract symptoms 11/08/2022    S/P AVR (tissue) by Dr. Padilla 8/5/2021 08/10/2021    GI bleed 07/25/2021    Shortness of breath 04/19/2021    Chest discomfort 04/19/2021     Note Last Updated: 4/19/2021     Added automatically from request for surgery [id number]      Aortic stenosis, severe 04/19/2021     Note Last Updated: 4/19/2021     Added automatically from request for surgery [id number]      CVA (cerebral vascular accident) 12/03/2019     Note Last Updated: 11/8/2022     2011 - took 325 ASA x years      Acute on chronic blood loss anemia 12/02/2019    Essential hypertension 12/02/2019    Mixed hyperlipidemia 12/02/2019    Chronic GERD 12/02/2019    Iron deficiency anemia 12/09/2013    Pure hypercholesterolemia 12/09/2013    Solitary pulmonary nodule 12/09/2013     Note Last Updated: 11/8/2022     On left lung.  Saw [facility] - followed, determined to be benign.  Discovered in  2011             Past Surgical History:   Procedure Laterality Date    AORTIC VALVE REPAIR/REPLACEMENT N/A 8/5/2021    Procedure: AORTIC VALVE REPAIR/REPLACEMENT;  Surgeon: Nael Padilla MD;  Location: [facility] CVOR;  Service: Cardiothoracic;  Laterality: N/A;    CARDIAC CATHETERIZATION N/A 4/27/2021    Procedure: Left Heart Cath;  Surgeon: Darian Fajardo MD;  Location: [facility] CATH INVASIVE LOCATION;  Service: Cardiovascular;  Laterality: N/A;    CARDIAC CATHETERIZATION N/A 4/27/2021    Procedure: Right Heart Cath;  Surgeon: Darian Fajardo MD;  Location: [facility] CATH INVASIVE LOCATION;  Service: Cardiovascular;  Laterality: N/A;    COLONOSCOPY      COLONOSCOPY N/A 7/27/2021    Procedure: COLONOSCOPY with polypectomy;  Surgeon: CIRA Pope MD;  Location: [facility] ENDOSCOPY;  Service: Gastroenterology;  Laterality: N/A;  post op: diverticulosis, polyp    ENDOSCOPY N/A 7/26/2021    Procedure: ESOPHAGOGASTRODUODENOSCOPY;  Surgeon: CIRA Pope MD;  Location: [facility] ENDOSCOPY;  Service: Gastroenterology;  Laterality: N/A;  HH, gastric polyps    HERNIA REPAIR       Current Outpatient Medications on File Prior to Visit   Medication Sig    acetaminophen (TYLENOL) 325 MG tablet Take 2 tablets by mouth Every 4 (Four) Hours As Needed for Mild Pain .    albuterol sulfate  (90 Base) MCG/ACT inhaler Inhale 2 puffs Every 4 (Four) Hours As Needed for Wheezing.    aspirin 81 MG EC tablet Take 1 tablet by mouth Daily.    hydrALAZINE (APRESOLINE) 25 MG tablet Take 1 tablet by mouth 2 (Two) Times a Day.    losartan (Cozaar) 50 MG tablet Take 1 tablet by mouth 2 (Two) Times a Day.    pantoprazole (PROTONIX) 40 MG EC tablet Take 1 tablet by mouth Daily.    simvastatin (ZOCOR) 40 MG tablet Take 1 tablet by mouth Every Night.     No current facility-administered medications on file prior to visit.     Allergies   Allergen Reactions    Sulfa Antibiotics Other (See Comments)     Unable to specify     Social History  "    Socioeconomic History    Marital status:    Tobacco Use    Smoking status: Former     Current packs/day: 0.00     Average packs/day: 1.5 packs/day for 54.4 years (81.6 ttl pk-yrs)     Types: Cigarettes     Start date: 1957     Quit date: 2011     Years since quittin.0     Passive exposure: Past    Smokeless tobacco: Never   Vaping Use    Vaping status: Never Used   Substance and Sexual Activity    Alcohol use: Not Currently     Comment: quit in     Drug use: No    Sexual activity: Not Currently     Partners: Female     Family History   Problem Relation Age of Onset    Heart attack Mother     Heart failure Mother     Cancer Father        Review of Systems    Objective   /79 (BP Location: Right arm, Patient Position: Sitting, Cuff Size: Adult)   Pulse 68   Temp 97.7 °F (36.5 °C) (Infrared)   Resp 18   Ht 167.6 cm (65.98\")   Wt 77.7 kg (171 lb 3.2 oz)   SpO2 91%   BMI 27.65 kg/m²   Physical Exam  Constitutional:       Appearance: He is well-developed. He is not toxic-appearing.   HENT:      Head: Normocephalic and atraumatic.   Eyes:      Conjunctiva/sclera: Conjunctivae normal.   Cardiovascular:      Rate and Rhythm: Normal rate and regular rhythm.      Heart sounds: No murmur heard.  Pulmonary:      Effort: Pulmonary effort is normal. No respiratory distress.      Breath sounds: Normal breath sounds.   Musculoskeletal:         General: Normal range of motion.      Cervical back: Normal range of motion.      Right lower leg: No edema.      Left lower leg: No edema.   Skin:     General: Skin is warm and dry.      Findings: No rash.   Neurological:      General: No focal deficit present.      Mental Status: He is alert and oriented to person, place, and time.      Gait: Gait normal.   Psychiatric:         Behavior: Behavior normal.           No visits with results within 4 Month(s) from this visit.   Latest known visit with results is:   Results Encounter on 2023 "   Component Date Value Ref Range Status    Total Cholesterol 11/17/2023 135  100 - 199 mg/dL Final    Triglycerides 11/17/2023 283 (H)  0 - 149 mg/dL Final    HDL Cholesterol 11/17/2023 40  >39 mg/dL Final    VLDL Cholesterol Alcon 11/17/2023 44 (H)  5 - 40 mg/dL Final    LDL Chol Calc (NIH) 11/17/2023 51  0 - 99 mg/dL Final    Glucose 11/17/2023 109 (H)  70 - 99 mg/dL Final    BUN 11/17/2023 9  8 - 27 mg/dL Final    Creatinine 11/17/2023 1.11  0.76 - 1.27 mg/dL Final    EGFR Result 11/17/2023 68  >59 mL/min/1.73 Final    BUN/Creatinine Ratio 11/17/2023 8 (L)  10 - 24 Final    Sodium 11/17/2023 138  134 - 144 mmol/L Final    Potassium 11/17/2023 4.6  3.5 - 5.2 mmol/L Final    Chloride 11/17/2023 103  96 - 106 mmol/L Final    Total CO2 11/17/2023 23  20 - 29 mmol/L Final    Calcium 11/17/2023 9.9  8.6 - 10.2 mg/dL Final    Total Protein 11/17/2023 6.2  6.0 - 8.5 g/dL Final    Albumin 11/17/2023 4.2  3.8 - 4.8 g/dL Final    Globulin 11/17/2023 2.0  1.5 - 4.5 g/dL Final    A/G Ratio 11/17/2023 2.1  1.2 - 2.2 Final    Total Bilirubin 11/17/2023 0.3  0.0 - 1.2 mg/dL Final    Alkaline Phosphatase 11/17/2023 41 (L)  44 - 121 IU/L Final    AST (SGOT) 11/17/2023 18  0 - 40 IU/L Final    ALT (SGPT) 11/17/2023 7  0 - 44 IU/L Final    WBC 11/17/2023 11.6 (H)  3.4 - 10.8 x10E3/uL Final    RBC 11/17/2023 3.88 (L)  4.14 - 5.80 x10E6/uL Final    Hemoglobin 11/17/2023 11.7 (L)  13.0 - 17.7 g/dL Final    Hematocrit 11/17/2023 34.3 (L)  37.5 - 51.0 % Final    MCV 11/17/2023 88  79 - 97 fL Final    MCH 11/17/2023 30.2  26.6 - 33.0 pg Final    MCHC 11/17/2023 34.1  31.5 - 35.7 g/dL Final    RDW 11/17/2023 12.3  11.6 - 15.4 % Final    Platelets 11/17/2023 207  150 - 450 x10E3/uL Final    Neutrophil Rel % 11/17/2023 81  Not Estab. % Final    Lymphocyte Rel % 11/17/2023 13  Not Estab. % Final    Monocyte Rel % 11/17/2023 5  Not Estab. % Final    Eosinophil Rel % 11/17/2023 1  Not Estab. % Final    Basophil Rel % 11/17/2023 0  Not Estab. %  Final    Neutrophils Absolute 11/17/2023 9.2 (H)  1.4 - 7.0 x10E3/uL Final    Lymphocytes Absolute 11/17/2023 1.5  0.7 - 3.1 x10E3/uL Final    Monocytes Absolute 11/17/2023 0.6  0.1 - 0.9 x10E3/uL Final    Eosinophils Absolute 11/17/2023 0.1  0.0 - 0.4 x10E3/uL Final    Basophils Absolute 11/17/2023 0.0  0.0 - 0.2 x10E3/uL Final    Immature Granulocyte Rel % 11/17/2023 0  Not Estab. % Final    Immature Grans Absolute 11/17/2023 0.0  0.0 - 0.1 x10E3/uL Final       BMI is >= 25 and <30. (Overweight) The following options were offered after discussion;: exercise counseling/recommendations and nutrition counseling/recommendations     Assessment & Plan   Diagnoses and all orders for this visit:    1. Essential hypertension (Primary)  -     Comprehensive Metabolic Panel; Future    2. BPH with obstruction/lower urinary tract symptoms  -     terazosin (HYTRIN) 5 MG capsule; Take 1 capsule by mouth Every Night.  Dispense: 90 capsule; Refill: 3    3. Dysfunction of both eustachian tubes  -     fluticasone (FLONASE) 50 MCG/ACT nasal spray; 2 sprays into the nostril(s) as directed by provider Daily.  Dispense: 18.2 mL; Refill: 5    4. Iron deficiency anemia due to chronic blood loss  -     CBC & Differential; Future  -     Iron Profile; Future  -     Ferritin; Future    5. Mixed hyperlipidemia  -     Lipid Panel; Future    6. S/P AVR (tissue) by Dr. Padilla 8/5/2021    7. White coat syndrome with diagnosis of hypertension    8. Aortic stenosis, severe    Status of multiple chronic medical reviewed today as above.  Hypertension appears to be under good control especially based on his home numbers.  Continue losartan 50 mg twice a day, hydralazine 25 mg twice a day.  BPH symptoms are under control on terazosin 5 mg/day.  Continue that at current dose.  Refill Flonase and terazosin.  Get labs to follow-up on his iron deficiency anemia, high cholesterol.  Will also check renal function and electrolytes.  I will follow-up with him  when blood tests are back.  Currently asymptomatic regarding his aortic valve replacement.  Keep follow-up with cardiology per their recommendations.  No evidence of recurrent stroke.  Continue with secondary prevention strategies.  Follow-up again in 6 months for Medicare wellness or sooner if needed.        Call with any problems or concerns before next visit       Return in about 6 months (around 11/22/2024), or for Medicare Wellness, for with labs a few days before.      Much of this report is an electronic transcription of spoken language to printed text using Dragon dictation software.  As such, the subtleties and finesse of spoken language may permit erroneous, or at times, nonsensical words or phrases to be inadvertently transcribed; thus changes may be made at a later date to rectify these errors.     Aaliyah Hearn MD6/8/202421:07 EDT  This note has been electronically signed

## 2024-06-20 ENCOUNTER — OFFICE VISIT (OUTPATIENT)
Dept: FAMILY MEDICINE CLINIC | Facility: CLINIC | Age: 79
End: 2024-06-20
Payer: MEDICARE

## 2024-06-20 VITALS
SYSTOLIC BLOOD PRESSURE: 117 MMHG | TEMPERATURE: 98.4 F | DIASTOLIC BLOOD PRESSURE: 53 MMHG | OXYGEN SATURATION: 94 % | BODY MASS INDEX: 27.16 KG/M2 | HEIGHT: 66 IN | WEIGHT: 169 LBS | HEART RATE: 48 BPM

## 2024-06-20 DIAGNOSIS — J06.9 UPPER RESPIRATORY TRACT INFECTION, UNSPECIFIED TYPE: Primary | ICD-10-CM

## 2024-06-20 PROCEDURE — 3074F SYST BP LT 130 MM HG: CPT | Performed by: NURSE PRACTITIONER

## 2024-06-20 PROCEDURE — 3078F DIAST BP <80 MM HG: CPT | Performed by: NURSE PRACTITIONER

## 2024-06-20 PROCEDURE — 1160F RVW MEDS BY RX/DR IN RCRD: CPT | Performed by: NURSE PRACTITIONER

## 2024-06-20 PROCEDURE — 1159F MED LIST DOCD IN RCRD: CPT | Performed by: NURSE PRACTITIONER

## 2024-06-20 PROCEDURE — 99213 OFFICE O/P EST LOW 20 MIN: CPT | Performed by: NURSE PRACTITIONER

## 2024-06-20 PROCEDURE — 1126F AMNT PAIN NOTED NONE PRSNT: CPT | Performed by: NURSE PRACTITIONER

## 2024-06-20 RX ORDER — AZITHROMYCIN 250 MG/1
TABLET, FILM COATED ORAL
Qty: 6 TABLET | Refills: 0 | Status: SHIPPED | OUTPATIENT
Start: 2024-06-20 | End: 2024-06-25

## 2024-06-20 RX ORDER — BROMPHENIRAMINE MALEATE, PSEUDOEPHEDRINE HYDROCHLORIDE, AND DEXTROMETHORPHAN HYDROBROMIDE 2; 30; 10 MG/5ML; MG/5ML; MG/5ML
5 SYRUP ORAL 4 TIMES DAILY PRN
Qty: 150 ML | Refills: 0 | Status: SHIPPED | OUTPATIENT
Start: 2024-06-20

## 2024-06-20 NOTE — PROGRESS NOTES
"Chief Complaint  Cough, Nasal Congestion, and Fatigue        Chong Manuel presents to [facility]        Subjective      79-year-old male patient of Dr. Aaliyah Hearn presents today with acute complaints.    Mowed yard Monday and \"wind was blowing all kinds of debris\" in face.  Currently with cough congestion and fatigue.  He has been taking zyrtec, Flonase, and Mucinex but not much relief. No fevers, chills, N/V/D. Cough is productive and clear. Did have a pink tinge on Tuesday from excessive cough.                     Objective         Vital Signs:     /53 (BP Location: Right arm, Patient Position: Sitting, Cuff Size: Adult)   Pulse (!) 48   Temp 98.4 °F (36.9 °C) (Infrared)   Ht 167.6 cm (65.98\")   Wt 76.7 kg (169 lb)   SpO2 94%   BMI 27.29 kg/m²       Physical Exam  Vitals reviewed.   Constitutional:       Appearance: He is well-developed.      Comments:      HENT:      Head: Normocephalic and atraumatic.      Nose: Congestion and rhinorrhea present.      Right Sinus: No maxillary sinus tenderness or frontal sinus tenderness.      Left Sinus: No maxillary sinus tenderness or frontal sinus tenderness.      Mouth/Throat:      Lips: Pink. No lesions.      Mouth: Mucous membranes are moist.      Tongue: No lesions.      Palate: No lesions.      Pharynx: Oropharynx is clear. Uvula midline. Posterior oropharyngeal erythema present. No oropharyngeal exudate.      Tonsils: No tonsillar exudate.      Comments: Thick clear PND noted  Eyes:      Conjunctiva/sclera: Conjunctivae normal.      Pupils: Pupils are equal, round, and reactive to light.   Cardiovascular:      Rate and Rhythm: Normal rate.   Pulmonary:      Effort: Pulmonary effort is normal.      Breath sounds: Rhonchi present.   Musculoskeletal:         General: Normal range of motion.      Cervical back: Normal range of motion.   Lymphadenopathy:      Head:      Right side of head: No submental, " submandibular, tonsillar, preauricular, posterior auricular or occipital adenopathy.      Left side of head: No submental, submandibular, tonsillar, preauricular, posterior auricular or occipital adenopathy.   Skin:     General: Skin is warm and dry.      Findings: No rash.   Neurological:      Mental Status: He is alert and oriented to person, place, and time.   Psychiatric:         Behavior: Behavior normal.                History of Present Illness      Patient Active Problem List   Diagnosis    Acute on chronic blood loss anemia    Essential hypertension    Mixed hyperlipidemia    Chronic GERD    CVA (cerebral vascular accident)    Iron deficiency anemia    Pure hypercholesterolemia    Solitary pulmonary nodule    Shortness of breath    Chest discomfort    Aortic stenosis, severe    GI bleed    S/P AVR (tissue) by Dr. Padilla 8/5/2021    BPH with obstruction/lower urinary tract symptoms    Primary osteoarthritis involving multiple joints    Pneumonia of both lower lobes due to infectious organism    Acute cough    SOB (shortness of breath)    White coat syndrome with diagnosis of hypertension    Upper respiratory tract infection         Past Medical History:   Diagnosis Date    Anemia     GERD (gastroesophageal reflux disease)     History of transfusion     Hyperlipidemia     Hypertension     Lung nodules     left     Shortness of breath 4/19/2021    Stroke 2011          Family History   Problem Relation Age of Onset    Heart attack Mother     Heart failure Mother     Cancer Father           Past Surgical History:   Procedure Laterality Date    AORTIC VALVE REPAIR/REPLACEMENT N/A 8/5/2021    Procedure: AORTIC VALVE REPAIR/REPLACEMENT;  Surgeon: Nael Padilla MD;  Location: [facility] CVOR;  Service: Cardiothoracic;  Laterality: N/A;    CARDIAC CATHETERIZATION N/A 4/27/2021    Procedure: Left Heart Cath;  Surgeon: Darian Fajardo MD;  Location: [facility] CATH INVASIVE LOCATION;  Service: Cardiovascular;  Laterality:  N/A;    CARDIAC CATHETERIZATION N/A 2021    Procedure: Right Heart Cath;  Surgeon: Darian Fajardo MD;  Location: [facility] CATH INVASIVE LOCATION;  Service: Cardiovascular;  Laterality: N/A;    COLONOSCOPY      COLONOSCOPY N/A 2021    Procedure: COLONOSCOPY with polypectomy;  Surgeon: CIRA Pope MD;  Location: [facility] ENDOSCOPY;  Service: Gastroenterology;  Laterality: N/A;  post op: diverticulosis, polyp    ENDOSCOPY N/A 2021    Procedure: ESOPHAGOGASTRODUODENOSCOPY;  Surgeon: CIRA Pope MD;  Location: [facility] ENDOSCOPY;  Service: Gastroenterology;  Laterality: N/A;  HH, gastric polyps    HERNIA REPAIR            Social History     Socioeconomic History    Marital status:    Tobacco Use    Smoking status: Former     Current packs/day: 0.00     Average packs/day: 1.5 packs/day for 54.4 years (81.6 ttl pk-yrs)     Types: Cigarettes     Start date: 1957     Quit date: 2011     Years since quittin.0     Passive exposure: Past    Smokeless tobacco: Never   Vaping Use    Vaping status: Never Used   Substance and Sexual Activity    Alcohol use: Not Currently     Comment: quit in     Drug use: No    Sexual activity: Not Currently     Partners: Female                    Result Review :                                                  Assessment and Plan      Diagnoses and all orders for this visit:    1. Upper respiratory tract infection, unspecified type (Primary)    Other orders  -     azithromycin (Zithromax Z-Rob) 250 MG tablet; Take 2 tablets the first day, then 1 tablet daily for 4 days.  Dispense: 6 tablet; Refill: 0  -     brompheniramine-pseudoephedrine-DM 30-2-10 MG/5ML syrup; Take 5 mL by mouth 4 (Four) Times a Day As Needed for Allergies, Cough or Congestion.  Dispense: 150 mL; Refill: 0        Patient to continue Flonase and Zyrtec.  Will place on a Z-Rob and Bromfed-DM for the cough congestion drainage.                  Follow Up       No follow-ups on  file.      Patient was given instructions and counseling regarding his condition or for health maintenance advice. Please see specific information pulled into the AVS if appropriate.     Jennifer Cedillo, APRN6/20/202411:29 EDT  This note has been electronically signed

## 2024-11-01 DIAGNOSIS — I10 ESSENTIAL HYPERTENSION: ICD-10-CM

## 2024-11-01 DIAGNOSIS — E78.2 MIXED HYPERLIPIDEMIA: ICD-10-CM

## 2024-11-01 DIAGNOSIS — R06.02 SHORTNESS OF BREATH: ICD-10-CM

## 2024-11-01 DIAGNOSIS — I63.9 CEREBROVASCULAR ACCIDENT (CVA), UNSPECIFIED MECHANISM: Chronic | ICD-10-CM

## 2024-11-01 DIAGNOSIS — I35.0 AORTIC STENOSIS, SEVERE: ICD-10-CM

## 2024-11-01 DIAGNOSIS — Z95.2 S/P AVR: ICD-10-CM

## 2024-11-01 RX ORDER — SIMVASTATIN 40 MG
40 TABLET ORAL NIGHTLY
Qty: 90 TABLET | Refills: 3 | Status: SHIPPED | OUTPATIENT
Start: 2024-11-01

## 2024-11-01 RX ORDER — LOSARTAN POTASSIUM 50 MG/1
50 TABLET ORAL 2 TIMES DAILY
Qty: 180 TABLET | Refills: 3 | Status: SHIPPED | OUTPATIENT
Start: 2024-11-01

## 2024-11-01 RX ORDER — HYDRALAZINE HYDROCHLORIDE 25 MG/1
25 TABLET, FILM COATED ORAL 2 TIMES DAILY
Qty: 180 TABLET | Refills: 3 | Status: SHIPPED | OUTPATIENT
Start: 2024-11-01

## 2024-11-19 LAB
ALBUMIN SERPL-MCNC: 4.3 G/DL (ref 3.8–4.8)
ALP SERPL-CCNC: 45 IU/L (ref 44–121)
ALT SERPL-CCNC: 7 IU/L (ref 0–44)
AST SERPL-CCNC: 17 IU/L (ref 0–40)
BASOPHILS # BLD AUTO: 0.1 X10E3/UL (ref 0–0.2)
BASOPHILS NFR BLD AUTO: 1 %
BILIRUB SERPL-MCNC: 0.3 MG/DL (ref 0–1.2)
BUN SERPL-MCNC: 6 MG/DL (ref 8–27)
BUN/CREAT SERPL: 6 (ref 10–24)
CALCIUM SERPL-MCNC: 10.1 MG/DL (ref 8.6–10.2)
CHLORIDE SERPL-SCNC: 103 MMOL/L (ref 96–106)
CHOLEST SERPL-MCNC: 138 MG/DL (ref 100–199)
CO2 SERPL-SCNC: 22 MMOL/L (ref 20–29)
CREAT SERPL-MCNC: 1.09 MG/DL (ref 0.76–1.27)
EGFRCR SERPLBLD CKD-EPI 2021: 69 ML/MIN/1.73
EOSINOPHIL # BLD AUTO: 0.2 X10E3/UL (ref 0–0.4)
EOSINOPHIL NFR BLD AUTO: 3 %
ERYTHROCYTE [DISTWIDTH] IN BLOOD BY AUTOMATED COUNT: 12.4 % (ref 11.6–15.4)
FERRITIN SERPL-MCNC: 32 NG/ML (ref 30–400)
GLOBULIN SER CALC-MCNC: 2.3 G/DL (ref 1.5–4.5)
GLUCOSE SERPL-MCNC: 105 MG/DL (ref 70–99)
HCT VFR BLD AUTO: 37.4 % (ref 37.5–51)
HDLC SERPL-MCNC: 38 MG/DL
HGB BLD-MCNC: 12.1 G/DL (ref 13–17.7)
IMM GRANULOCYTES # BLD AUTO: 0 X10E3/UL (ref 0–0.1)
IMM GRANULOCYTES NFR BLD AUTO: 0 %
IRON SATN MFR SERPL: 28 % (ref 15–55)
IRON SERPL-MCNC: 87 UG/DL (ref 38–169)
LDLC SERPL CALC-MCNC: 60 MG/DL (ref 0–99)
LYMPHOCYTES # BLD AUTO: 1.8 X10E3/UL (ref 0.7–3.1)
LYMPHOCYTES NFR BLD AUTO: 26 %
MCH RBC QN AUTO: 30.1 PG (ref 26.6–33)
MCHC RBC AUTO-ENTMCNC: 32.4 G/DL (ref 31.5–35.7)
MCV RBC AUTO: 93 FL (ref 79–97)
MONOCYTES # BLD AUTO: 0.6 X10E3/UL (ref 0.1–0.9)
MONOCYTES NFR BLD AUTO: 9 %
NEUTROPHILS # BLD AUTO: 4.3 X10E3/UL (ref 1.4–7)
NEUTROPHILS NFR BLD AUTO: 61 %
PLATELET # BLD AUTO: 291 X10E3/UL (ref 150–450)
POTASSIUM SERPL-SCNC: 4.7 MMOL/L (ref 3.5–5.2)
PROT SERPL-MCNC: 6.6 G/DL (ref 6–8.5)
RBC # BLD AUTO: 4.02 X10E6/UL (ref 4.14–5.8)
SODIUM SERPL-SCNC: 139 MMOL/L (ref 134–144)
TIBC SERPL-MCNC: 309 UG/DL (ref 250–450)
TRIGL SERPL-MCNC: 248 MG/DL (ref 0–149)
UIBC SERPL-MCNC: 222 UG/DL (ref 111–343)
VLDLC SERPL CALC-MCNC: 40 MG/DL (ref 5–40)
WBC # BLD AUTO: 6.9 X10E3/UL (ref 3.4–10.8)

## 2024-11-19 NOTE — PROGRESS NOTES
Subjective   Chong Manuel is a 79 y.o. male.   Chief Complaint   Patient presents with    Hypertension    Hyperlipidemia       History of Present Illness   79 y.o. male with multiple chronic medical problems as below presents to the office today to follow-up.  I last saw him back in May.  He had lab work done a few days ago to assist in monitoring his chronic problems.    Anemia-hemoglobin is up to 12.1.  MCV is 93.  Platelet count 291,000.  Iron profile is normal.  Iron level is 87, TIBC is 309.  Ferritin is low normal at 32  Lipid panel-total cholesterol 138, triglycerides 248, LDL 60, HDL 38    Continues on Zocor 40 mg/day.    HTN-on hydralazine 25 mg twice a day, losartan 50 mg twice a day, terazosin 5 mg at bedtime.  Blood pressure today    GERD with history of GI bleed-continues on pantoprazole 40 mg/day.  Hemoglobin is good as above.    History of stroke-full recovery.      History of Present Illness  The patient is an 79-year-old male who presents for evaluation of multiple medical concerns.    He reports experiencing ear drainage and has been prescribed a nasal spray to manage this symptom.    He has a history of hypertension and is currently on medication for the condition. His blood pressure readings, taken an hour after medication, are within normal limits. He was scheduled for a yearly checkup with his cardiologist on 11/05/2024, but the appointment was cancelled. He was informed that he would undergo a test during this visit. His last visit to the cardiologist was in 11/2023, and he was advised to return in a year. He underwent aortic valve replacement in 2021.    He experiences chills every evening between 6 and 8 PM, a symptom that started last fall when his medication dosage was increased. He does not monitor his body temperature. He has a history of anemia and is keen on maintaining his weight due to acid reflux.    He suffers from emphysema and uses Mucinex to manage mucus production.    He  experiences heartburn and takes pantoprazole, which provides relief for 12 hours. He also takes over-the-counter Pepcid at bedtime.      Patient Active Problem List    Diagnosis Date Noted    Upper respiratory tract infection 06/20/2024    White coat syndrome with diagnosis of hypertension 05/15/2023    Acute cough 04/20/2023    SOB (shortness of breath) 04/20/2023    Primary osteoarthritis involving multiple joints 02/13/2023    BPH with obstruction/lower urinary tract symptoms 11/08/2022    S/P AVR (tissue) by Dr. Padilla 8/5/2021 08/10/2021    GI bleed 07/25/2021    Shortness of breath 04/19/2021    Chest discomfort 04/19/2021     Note Last Updated: 4/19/2021     Added automatically from request for surgery [id number]      Aortic stenosis, severe 04/19/2021     Note Last Updated: 4/19/2021     Added automatically from request for surgery [id number]      CVA (cerebral vascular accident) 12/03/2019     Note Last Updated: 11/8/2022     2011 - took 325 ASA x years      Acute on chronic blood loss anemia 12/02/2019    Essential hypertension 12/02/2019    Mixed hyperlipidemia 12/02/2019    Chronic GERD 12/02/2019    Iron deficiency anemia 12/09/2013    Pure hypercholesterolemia 12/09/2013    Solitary pulmonary nodule 12/09/2013     Note Last Updated: 11/8/2022     On left lung.  Saw [facility] - followed, determined to be benign.  Discovered in 2011             Past Surgical History:   Procedure Laterality Date    AORTIC VALVE REPAIR/REPLACEMENT N/A 8/5/2021    Procedure: AORTIC VALVE REPAIR/REPLACEMENT;  Surgeon: Nael Padilla MD;  [facility];  Service: Cardiothoracic;  Laterality: N/A;    CARDIAC CATHETERIZATION N/A 4/27/2021    Procedure: Left Heart Cath;  Surgeon: Darian Fajardo MD;  Location: [facility] CATH INVASIVE LOCATION;  Service: Cardiovascular;  Laterality: N/A;    CARDIAC CATHETERIZATION N/A 4/27/2021    Procedure: Right Heart Cath;  Surgeon: Darian Fajardo MD;  Location: [facility] CATH INVASIVE LOCATION;   Service: Cardiovascular;  Laterality: N/A;    COLONOSCOPY      COLONOSCOPY N/A 7/27/2021    Procedure: COLONOSCOPY with polypectomy;  Surgeon: CIRA Pope MD;  Location: [facility] ENDOSCOPY;  Service: Gastroenterology;  Laterality: N/A;  post op: diverticulosis, polyp    ENDOSCOPY N/A 7/26/2021    Procedure: ESOPHAGOGASTRODUODENOSCOPY;  Surgeon: CIRA Pope MD;  Location: [facility] ENDOSCOPY;  Service: Gastroenterology;  Laterality: N/A;  HH, gastric polyps    HERNIA REPAIR       Current Outpatient Medications on File Prior to Visit   Medication Sig    acetaminophen (TYLENOL) 325 MG tablet Take 2 tablets by mouth Every 4 (Four) Hours As Needed for Mild Pain .    albuterol sulfate  (90 Base) MCG/ACT inhaler Inhale 2 puffs Every 4 (Four) Hours As Needed for Wheezing.    aspirin 81 MG EC tablet Take 1 tablet by mouth Daily.    hydrALAZINE (APRESOLINE) 25 MG tablet Take 1 tablet by mouth twice daily    losartan (COZAAR) 50 MG tablet Take 1 tablet by mouth twice daily    simvastatin (ZOCOR) 40 MG tablet TAKE 1 TABLET BY MOUTH ONCE DAILY AT NIGHT    terazosin (HYTRIN) 5 MG capsule Take 1 capsule by mouth Every Night.    [DISCONTINUED] fluticasone (FLONASE) 50 MCG/ACT nasal spray 2 sprays into the nostril(s) as directed by provider Daily.    [DISCONTINUED] pantoprazole (PROTONIX) 40 MG EC tablet Take 1 tablet by mouth Daily.    [DISCONTINUED] brompheniramine-pseudoephedrine-DM 30-2-10 MG/5ML syrup Take 5 mL by mouth 4 (Four) Times a Day As Needed for Allergies, Cough or Congestion. (Patient not taking: Reported on 11/22/2024)     No current facility-administered medications on file prior to visit.     Allergies   Allergen Reactions    Sulfa Antibiotics Other (See Comments)     Unable to specify     Social History     Socioeconomic History    Marital status:    Tobacco Use    Smoking status: Former     Current packs/day: 0.00     Average packs/day: 1.5 packs/day for 54.4 years (81.6 ttl pk-yrs)      "Types: Cigarettes     Start date: 1957     Quit date: 2011     Years since quittin.5     Passive exposure: Past    Smokeless tobacco: Never   Vaping Use    Vaping status: Never Used   Substance and Sexual Activity    Alcohol use: Not Currently     Comment: quit in     Drug use: No    Sexual activity: Not Currently     Partners: Female     Family History   Problem Relation Age of Onset    Heart attack Mother     Heart failure Mother     Cancer Father        Review of Systems    Objective   /80 (BP Location: Left arm, Patient Position: Sitting, Cuff Size: Adult)   Pulse 83   Temp 97.7 °F (36.5 °C) (Infrared)   Ht 167.6 cm (65.98\")   Wt 77.1 kg (170 lb)   SpO2 94%   BMI 27.45 kg/m²   Physical Exam  Constitutional:       Appearance: He is well-developed.      Comments: Robust appearing elderly white male, looks younger than known age of 79 years.     HENT:      Head: Normocephalic and atraumatic.   Eyes:      Conjunctiva/sclera: Conjunctivae normal.   Cardiovascular:      Rate and Rhythm: Normal rate and regular rhythm.      Heart sounds: No murmur heard.  Pulmonary:      Effort: Pulmonary effort is normal. No respiratory distress.   Musculoskeletal:         General: Normal range of motion.      Cervical back: Normal range of motion.      Right lower leg: No edema.      Left lower leg: No edema.   Skin:     General: Skin is warm and dry.      Findings: No rash.   Neurological:      Mental Status: He is alert and oriented to person, place, and time.   Psychiatric:         Behavior: Behavior normal.       Physical Exam  Heart rhythm is normal. No murmur.    Vital Signs  Weight is 170.      No visits with results within 4 Month(s) from this visit.   Latest known visit with results is:   Office Visit on 2024   Component Date Value Ref Range Status    Ferritin 2024 32  30 - 400 ng/mL Final    TIBC 2024 309  250 - 450 ug/dL Final    UIBC 2024 222  111 - 343 ug/dL Final    " Iron 11/18/2024 87  38 - 169 ug/dL Final    Iron Saturation 11/18/2024 28  15 - 55 % Final    Total Cholesterol 11/18/2024 138  100 - 199 mg/dL Final    Triglycerides 11/18/2024 248 (H)  0 - 149 mg/dL Final    HDL Cholesterol 11/18/2024 38 (L)  >39 mg/dL Final    VLDL Cholesterol Alcon 11/18/2024 40  5 - 40 mg/dL Final    LDL Chol Calc (NIH) 11/18/2024 60  0 - 99 mg/dL Final    Glucose 11/18/2024 105 (H)  70 - 99 mg/dL Final    BUN 11/18/2024 6 (L)  8 - 27 mg/dL Final    Creatinine 11/18/2024 1.09  0.76 - 1.27 mg/dL Final    EGFR Result 11/18/2024 69  >59 mL/min/1.73 Final    BUN/Creatinine Ratio 11/18/2024 6 (L)  10 - 24 Final    Sodium 11/18/2024 139  134 - 144 mmol/L Final    Potassium 11/18/2024 4.7  3.5 - 5.2 mmol/L Final    Chloride 11/18/2024 103  96 - 106 mmol/L Final    Total CO2 11/18/2024 22  20 - 29 mmol/L Final    Calcium 11/18/2024 10.1  8.6 - 10.2 mg/dL Final    Total Protein 11/18/2024 6.6  6.0 - 8.5 g/dL Final    Albumin 11/18/2024 4.3  3.8 - 4.8 g/dL Final    Globulin 11/18/2024 2.3  1.5 - 4.5 g/dL Final    Total Bilirubin 11/18/2024 0.3  0.0 - 1.2 mg/dL Final    Alkaline Phosphatase 11/18/2024 45  44 - 121 IU/L Final    AST (SGOT) 11/18/2024 17  0 - 40 IU/L Final    ALT (SGPT) 11/18/2024 7  0 - 44 IU/L Final    WBC 11/18/2024 6.9  3.4 - 10.8 x10E3/uL Final    RBC 11/18/2024 4.02 (L)  4.14 - 5.80 x10E6/uL Final    Hemoglobin 11/18/2024 12.1 (L)  13.0 - 17.7 g/dL Final    Hematocrit 11/18/2024 37.4 (L)  37.5 - 51.0 % Final    MCV 11/18/2024 93  79 - 97 fL Final    MCH 11/18/2024 30.1  26.6 - 33.0 pg Final    MCHC 11/18/2024 32.4  31.5 - 35.7 g/dL Final    RDW 11/18/2024 12.4  11.6 - 15.4 % Final    Platelets 11/18/2024 291  150 - 450 x10E3/uL Final    Neutrophil Rel % 11/18/2024 61  Not Estab. % Final    Lymphocyte Rel % 11/18/2024 26  Not Estab. % Final    Monocyte Rel % 11/18/2024 9  Not Estab. % Final    Eosinophil Rel % 11/18/2024 3  Not Estab. % Final    Basophil Rel % 11/18/2024 1  Not Estab. %  Final    Neutrophils Absolute 11/18/2024 4.3  1.4 - 7.0 x10E3/uL Final    Lymphocytes Absolute 11/18/2024 1.8  0.7 - 3.1 x10E3/uL Final    Monocytes Absolute 11/18/2024 0.6  0.1 - 0.9 x10E3/uL Final    Eosinophils Absolute 11/18/2024 0.2  0.0 - 0.4 x10E3/uL Final    Basophils Absolute 11/18/2024 0.1  0.0 - 0.2 x10E3/uL Final    Immature Granulocyte Rel % 11/18/2024 0  Not Estab. % Final    Immature Grans Absolute 11/18/2024 0.0  0.0 - 0.1 x10E3/uL Final     Results  Laboratory Studies  Blood count is 12.1. Iron levels are normal. Ferritin level is normal. Cholesterol level is great. Blood sugar is 105. Kidney function is normal. Liver tests are normal.          Assessment & Plan   Diagnoses and all orders for this visit:    1. Essential hypertension (Primary)    2. White coat syndrome with diagnosis of hypertension    3. S/P AVR (tissue) by Dr. Padilla 8/5/2021  -     Adult Transthoracic Echo Complete W/ Cont if Necessary Per Protocol; Future    4. Mixed hyperlipidemia    5. Chronic GERD  -     pantoprazole (PROTONIX) 40 MG EC tablet; Take 1 tablet by mouth 2 (Two) Times a Day.  Dispense: 180 tablet; Refill: 3    6. Gastrointestinal hemorrhage, unspecified gastrointestinal hemorrhage type    7. Iron deficiency anemia due to chronic blood loss    8. Cerebrovascular accident (CVA), unspecified mechanism    9. Dysfunction of both eustachian tubes  -     fluticasone (FLONASE) 50 MCG/ACT nasal spray; Administer 2 sprays into the nostril(s) as directed by provider Daily.  Dispense: 16 g; Refill: 5    10. Acute on chronic blood loss anemia  -     pantoprazole (PROTONIX) 40 MG EC tablet; Take 1 tablet by mouth 2 (Two) Times a Day.  Dispense: 180 tablet; Refill: 3      Assessment & Plan  1. Hypertension.  His blood pressure readings are within the normal range. He is advised to continue his current medication regimen.     2. Chills.  His blood work results are within normal limits, suggesting that the chills may be a side  effect of his medication. He is advised to monitor his body temperature during episodes of chills and use a blanket or sweater if needed.    3. Allergies.  A prescription for nasal spray will be provided to manage his allergy symptoms.    4. Heartburn.  His pantoprazole prescription will be adjusted to twice daily to better manage his heartburn symptoms.     5. Emphysema.  He uses Mucinex to keep mucus loose, as recommended by Dr. Dukes. There is no new medication available for emphysema.    6. Heart valve surveillance.  An echocardiogram will be ordered at [facility] for routine surveillance of his heart valve. Dr. Fajardo will review the results.    I reviewed all of his lab work with him today as above and answered his questions.  Multitude of chronic medical problems are all currently stable.  I will be following up with him regarding the echocardiogram.  If there is an issue with his prosthetic aortic valve, we will either get him back in with Dr. Fajardo sooner or get a consult with one of our valve specialists.      Follow-up  Return in 6 months for follow up.        Call with any problems or concerns before next visit       Return in about 6 months (around 5/22/2025).  Patient or patient representative verbalized consent for the use of Ambient Listening during the visit with  Aaliyah Hearn MD for chart documentation. 11/22/2024  12:38 EST    Part of this note may be an electronic transcription/translation of spoken language to printed text using the Dragon Dictation System    Aaliyah Hearn MD11/22/202413:59 EST  This note has been electronically signed

## 2024-11-22 ENCOUNTER — OFFICE VISIT (OUTPATIENT)
Dept: FAMILY MEDICINE CLINIC | Facility: CLINIC | Age: 79
End: 2024-11-22
Payer: MEDICAID

## 2024-11-22 VITALS
DIASTOLIC BLOOD PRESSURE: 80 MMHG | SYSTOLIC BLOOD PRESSURE: 144 MMHG | WEIGHT: 170 LBS | HEART RATE: 83 BPM | BODY MASS INDEX: 27.32 KG/M2 | TEMPERATURE: 97.7 F | OXYGEN SATURATION: 94 % | HEIGHT: 66 IN

## 2024-11-22 DIAGNOSIS — Z95.2 S/P AVR: ICD-10-CM

## 2024-11-22 DIAGNOSIS — D50.0 IRON DEFICIENCY ANEMIA DUE TO CHRONIC BLOOD LOSS: ICD-10-CM

## 2024-11-22 DIAGNOSIS — K92.2 GASTROINTESTINAL HEMORRHAGE, UNSPECIFIED GASTROINTESTINAL HEMORRHAGE TYPE: ICD-10-CM

## 2024-11-22 DIAGNOSIS — I63.9 CEREBROVASCULAR ACCIDENT (CVA), UNSPECIFIED MECHANISM: Chronic | ICD-10-CM

## 2024-11-22 DIAGNOSIS — K21.9 CHRONIC GERD: ICD-10-CM

## 2024-11-22 DIAGNOSIS — I10 ESSENTIAL HYPERTENSION: Primary | ICD-10-CM

## 2024-11-22 DIAGNOSIS — D62 ACUTE ON CHRONIC BLOOD LOSS ANEMIA: ICD-10-CM

## 2024-11-22 DIAGNOSIS — H69.93 DYSFUNCTION OF BOTH EUSTACHIAN TUBES: ICD-10-CM

## 2024-11-22 DIAGNOSIS — I10 WHITE COAT SYNDROME WITH DIAGNOSIS OF HYPERTENSION: ICD-10-CM

## 2024-11-22 DIAGNOSIS — E78.2 MIXED HYPERLIPIDEMIA: ICD-10-CM

## 2024-11-22 PROBLEM — J18.9 PNEUMONIA OF BOTH LOWER LOBES DUE TO INFECTIOUS ORGANISM: Status: RESOLVED | Noted: 2023-04-20 | Resolved: 2024-11-22

## 2024-11-22 RX ORDER — PANTOPRAZOLE SODIUM 40 MG/1
40 TABLET, DELAYED RELEASE ORAL 2 TIMES DAILY
Qty: 180 TABLET | Refills: 3 | Status: SHIPPED | OUTPATIENT
Start: 2024-11-22

## 2024-11-22 RX ORDER — FLUTICASONE PROPIONATE 50 MCG
2 SPRAY, SUSPENSION (ML) NASAL DAILY
Qty: 16 G | Refills: 5 | Status: SHIPPED | OUTPATIENT
Start: 2024-11-22

## 2024-12-30 ENCOUNTER — OUTSIDE FACILITY SERVICE (OUTPATIENT)
Dept: CARDIOLOGY | Facility: CLINIC | Age: 79
End: 2024-12-30
Payer: MEDICAID

## 2025-01-28 ENCOUNTER — TELEPHONE (OUTPATIENT)
Dept: FAMILY MEDICINE CLINIC | Facility: CLINIC | Age: 80
End: 2025-01-28
Payer: MEDICARE

## 2025-01-28 NOTE — TELEPHONE ENCOUNTER
Caller: Chong Manuel    Relationship: Self    Best call back number:     [phone] (Mobile)       What medication are you requesting: POSSIBLE ANTIBIOTIC      What are your current symptoms: COUGH AND SINUS DRAINAGE, CAUSING EAR PAIN       How long have you been experiencing symptoms: A COUPLE OF DAYS    Have you had these symptoms before:    [x] Yes  [] No    Have you been treated for these symptoms before:   [x] Yes  [] No    If a prescription is needed, what is your preferred pharmacy and phone number:      [facility] [id number] [place], [place] - [address] [facility] - [phone] [place] [phone] [facility] [phone]     Additional notes:

## 2025-01-28 NOTE — TELEPHONE ENCOUNTER
I called patient and let him know Dr Hearn is out of the office this week  and he will need to be seen. He made an appointment with Marli jorge tomorrow.

## 2025-01-29 ENCOUNTER — OFFICE VISIT (OUTPATIENT)
Dept: FAMILY MEDICINE CLINIC | Facility: CLINIC | Age: 80
End: 2025-01-29
Payer: MEDICARE

## 2025-01-29 VITALS
TEMPERATURE: 97.3 F | OXYGEN SATURATION: 93 % | HEIGHT: 66 IN | DIASTOLIC BLOOD PRESSURE: 78 MMHG | WEIGHT: 171 LBS | HEART RATE: 75 BPM | BODY MASS INDEX: 27.48 KG/M2 | SYSTOLIC BLOOD PRESSURE: 176 MMHG

## 2025-01-29 DIAGNOSIS — H61.23 BILATERAL IMPACTED CERUMEN: ICD-10-CM

## 2025-01-29 DIAGNOSIS — R05.9 COUGH, UNSPECIFIED TYPE: Primary | ICD-10-CM

## 2025-01-29 DIAGNOSIS — J01.00 ACUTE NON-RECURRENT MAXILLARY SINUSITIS: ICD-10-CM

## 2025-01-29 LAB
EXPIRATION DATE: NORMAL
FLUAV AG UPPER RESP QL IA.RAPID: NOT DETECTED
FLUBV AG UPPER RESP QL IA.RAPID: NOT DETECTED
INTERNAL CONTROL: NORMAL
Lab: NORMAL
SARS-COV-2 AG UPPER RESP QL IA.RAPID: NOT DETECTED

## 2025-01-29 PROCEDURE — 1126F AMNT PAIN NOTED NONE PRSNT: CPT | Performed by: NURSE PRACTITIONER

## 2025-01-29 PROCEDURE — 69209 REMOVE IMPACTED EAR WAX UNI: CPT | Performed by: NURSE PRACTITIONER

## 2025-01-29 PROCEDURE — 3078F DIAST BP <80 MM HG: CPT | Performed by: NURSE PRACTITIONER

## 2025-01-29 PROCEDURE — 99213 OFFICE O/P EST LOW 20 MIN: CPT | Performed by: NURSE PRACTITIONER

## 2025-01-29 PROCEDURE — 87428 SARSCOV & INF VIR A&B AG IA: CPT | Performed by: NURSE PRACTITIONER

## 2025-01-29 PROCEDURE — 3077F SYST BP >= 140 MM HG: CPT | Performed by: NURSE PRACTITIONER

## 2025-01-29 NOTE — PROGRESS NOTES
"    Chong Manuel is a 79 y.o. male.     History of Present Illness  79-year-old white male patient of Dr. Hearn who comes in today with 3-day history of sinus pressure and ear pain.  He was negative for COVID influenza.  On examination patient has acute maxillary sinusitis and his ears are impacted.  They were successfully irrigated which did show bulging TMs from allergies.  Will place him on antibiotic encouraged him to wear mask outside till he is off the antibiotics    Vital signs are a bit high blood pressure 170/78 heart rate 74 but he says it is always high at the office but not at home            COVID/influenza test  Bilateral ear irrigation  Augmentin 875 twice daily x 10 days with food  Continue Flonase consider Zyrtec in the morning  Wear mask when outside  Follow-up if no improvement       The following portions of the patient's history were reviewed and updated as appropriate: allergies, current medications, past family history, past medical history, past social history, past surgical history, and problem list.    Vitals:    01/29/25 1318 01/29/25 1328   BP: 170/80 176/78   BP Location: Right arm Right arm   Patient Position: Sitting Sitting   Cuff Size: Adult Adult   Pulse: 75    Temp: 97.3 °F (36.3 °C)    TempSrc: Infrared    SpO2: 93%    Weight: 77.6 kg (171 lb)    Height: 167.6 cm (65.98\")        Past Medical History:   Diagnosis Date    Anemia     GERD (gastroesophageal reflux disease)     History of transfusion     Hyperlipidemia     Hypertension     Lung nodules     left     Pneumonia of both lower lobes due to infectious organism 04/20/2023    Shortness of breath 04/19/2021    Stroke 2011     Past Surgical History:   Procedure Laterality Date    AORTIC VALVE REPAIR/REPLACEMENT N/A 8/5/2021    Procedure: AORTIC VALVE REPAIR/REPLACEMENT;  Surgeon: Nael Padilla MD;  Location: [facility];  Service: Cardiothoracic;  Laterality: N/A;    CARDIAC CATHETERIZATION N/A 4/27/2021    " Procedure: Left Heart Cath;  Surgeon: Darian Fajardo MD;  Location: [facility] CATH INVASIVE LOCATION;  Service: Cardiovascular;  Laterality: N/A;    CARDIAC CATHETERIZATION N/A 4/27/2021    Procedure: Right Heart Cath;  Surgeon: Darian Fajardo MD;  Location: [facility] CATH INVASIVE LOCATION;  Service: Cardiovascular;  Laterality: N/A;    COLONOSCOPY      COLONOSCOPY N/A 7/27/2021    Procedure: COLONOSCOPY with polypectomy;  Surgeon: CIRA Pope MD;  Location: [facility] ENDOSCOPY;  Service: Gastroenterology;  Laterality: N/A;  post op: diverticulosis, polyp    ENDOSCOPY N/A 7/26/2021    Procedure: ESOPHAGOGASTRODUODENOSCOPY;  Surgeon: CIRA Pope MD;  Location: [facility] ENDOSCOPY;  Service: Gastroenterology;  Laterality: N/A;  HH, gastric polyps    HERNIA REPAIR       Family History   Problem Relation Age of Onset    Heart attack Mother     Heart failure Mother     Cancer Father      Immunization History   Administered Date(s) Administered    ABRYSVO (RSV, 60+ or pregnant women 32-36 wks) 10/24/2024    COVID-19 (MODERNA) 1st,2nd,3rd Dose Monovalent 01/21/2021, 02/22/2021, 11/16/2021, 07/21/2022    Fluzone  >6mos 10/01/2015    Fluzone High-Dose 65+YRS 10/03/2016, 09/19/2017, 09/13/2018, 09/16/2019, 09/01/2020, 09/03/2024    Fluzone High-Dose 65+yrs 09/01/2020, 08/29/2022    H1N1 Inj Preservative Free 11/19/2009    Hep A / Hep B 11/04/2010, 11/15/2010, 11/29/2010    Influenza TIV (IM) 10/20/2012    Influenza, Unspecified 09/01/2022, 09/06/2023, 09/03/2024    Pneumococcal Conjugate 13-Valent (PCV13) 10/20/2012, 09/19/2017    Pneumococcal Polysaccharide (PPSV23) 01/28/2015    Shingrix 10/10/2023    Td (TDVAX) 11/04/2010       Office Visit on 05/22/2024   Component Date Value Ref Range Status    Ferritin 11/18/2024 32  30 - 400 ng/mL Final    TIBC 11/18/2024 309  250 - 450 ug/dL Final    UIBC 11/18/2024 222  111 - 343 ug/dL Final    Iron 11/18/2024 87  38 - 169 ug/dL Final    Iron Saturation 11/18/2024 28  15 - 55 %  Final    Total Cholesterol 11/18/2024 138  100 - 199 mg/dL Final    Triglycerides 11/18/2024 248 (H)  0 - 149 mg/dL Final    HDL Cholesterol 11/18/2024 38 (L)  >39 mg/dL Final    VLDL Cholesterol Alcon 11/18/2024 40  5 - 40 mg/dL Final    LDL Chol Calc (NIH) 11/18/2024 60  0 - 99 mg/dL Final    Glucose 11/18/2024 105 (H)  70 - 99 mg/dL Final    BUN 11/18/2024 6 (L)  8 - 27 mg/dL Final    Creatinine 11/18/2024 1.09  0.76 - 1.27 mg/dL Final    EGFR Result 11/18/2024 69  >59 mL/min/1.73 Final    BUN/Creatinine Ratio 11/18/2024 6 (L)  10 - 24 Final    Sodium 11/18/2024 139  134 - 144 mmol/L Final    Potassium 11/18/2024 4.7  3.5 - 5.2 mmol/L Final    Chloride 11/18/2024 103  96 - 106 mmol/L Final    Total CO2 11/18/2024 22  20 - 29 mmol/L Final    Calcium 11/18/2024 10.1  8.6 - 10.2 mg/dL Final    Total Protein 11/18/2024 6.6  6.0 - 8.5 g/dL Final    Albumin 11/18/2024 4.3  3.8 - 4.8 g/dL Final    Globulin 11/18/2024 2.3  1.5 - 4.5 g/dL Final    Total Bilirubin 11/18/2024 0.3  0.0 - 1.2 mg/dL Final    Alkaline Phosphatase 11/18/2024 45  44 - 121 IU/L Final    AST (SGOT) 11/18/2024 17  0 - 40 IU/L Final    ALT (SGPT) 11/18/2024 7  0 - 44 IU/L Final    WBC 11/18/2024 6.9  3.4 - 10.8 x10E3/uL Final    RBC 11/18/2024 4.02 (L)  4.14 - 5.80 x10E6/uL Final    Hemoglobin 11/18/2024 12.1 (L)  13.0 - 17.7 g/dL Final    Hematocrit 11/18/2024 37.4 (L)  37.5 - 51.0 % Final    MCV 11/18/2024 93  79 - 97 fL Final    MCH 11/18/2024 30.1  26.6 - 33.0 pg Final    MCHC 11/18/2024 32.4  31.5 - 35.7 g/dL Final    RDW 11/18/2024 12.4  11.6 - 15.4 % Final    Platelets 11/18/2024 291  150 - 450 x10E3/uL Final    Neutrophil Rel % 11/18/2024 61  Not Estab. % Final    Lymphocyte Rel % 11/18/2024 26  Not Estab. % Final    Monocyte Rel % 11/18/2024 9  Not Estab. % Final    Eosinophil Rel % 11/18/2024 3  Not Estab. % Final    Basophil Rel % 11/18/2024 1  Not Estab. % Final    Neutrophils Absolute 11/18/2024 4.3  1.4 - 7.0 x10E3/uL Final    Lymphocytes  Absolute 11/18/2024 1.8  0.7 - 3.1 x10E3/uL Final    Monocytes Absolute 11/18/2024 0.6  0.1 - 0.9 x10E3/uL Final    Eosinophils Absolute 11/18/2024 0.2  0.0 - 0.4 x10E3/uL Final    Basophils Absolute 11/18/2024 0.1  0.0 - 0.2 x10E3/uL Final    Immature Granulocyte Rel % 11/18/2024 0  Not Estab. % Final    Immature Grans Absolute 11/18/2024 0.0  0.0 - 0.1 x10E3/uL Final         Review of Systems   Constitutional: Negative.    HENT:  Positive for congestion, ear pain and sinus pressure.    Respiratory: Negative.     Cardiovascular: Negative.    Gastrointestinal: Negative.    Genitourinary: Negative.    Musculoskeletal: Negative.    Skin: Negative.    Neurological: Negative.    Psychiatric/Behavioral: Negative.         Objective   Physical Exam  Constitutional:       Appearance: Normal appearance.   HENT:      Head: Normocephalic.      Ears:      Comments: Unable to irrigate successfully will get Debrox eardrops and have him return     Nose:      Comments: Turbinates red     Mouth/Throat:      Comments: Postnasal drip  Cardiovascular:      Rate and Rhythm: Normal rate and regular rhythm.      Pulses: Normal pulses.      Heart sounds: Normal heart sounds.   Pulmonary:      Effort: Pulmonary effort is normal.      Breath sounds: Normal breath sounds.   Abdominal:      General: Bowel sounds are normal.   Musculoskeletal:         General: Normal range of motion.   Skin:     General: Skin is warm.   Neurological:      General: No focal deficit present.      Mental Status: He is alert and oriented to person, place, and time.   Psychiatric:         Mood and Affect: Mood normal.         Behavior: Behavior normal.         Ear Cerumen Removal    Date/Time: 1/29/2025 1:48 PM    Performed by: Marli Lane APRN  Authorized by: Marli Lane APRN    Anesthesia:  Local Anesthetic: none  Location details: left ear and right ear  Patient tolerance: patient tolerated the procedure well with no immediate complications  Comments:  Debrox ear drops prescipbed  Procedure type: irrigation   Sedation:  Patient sedated: no            Assessment & Plan   Diagnoses and all orders for this visit:    1. Cough, unspecified type (Primary)  -     POCT SARS-CoV-2 + Flu Antigen CHADD    Other orders  -     amoxicillin-clavulanate (AUGMENTIN) 875-125 MG per tablet; Take 1 tablet by mouth 2 (Two) Times a Day.  Dispense: 20 tablet; Refill: 0           Current Outpatient Medications:     acetaminophen (TYLENOL) 325 MG tablet, Take 2 tablets by mouth Every 4 (Four) Hours As Needed for Mild Pain ., Disp: , Rfl:     albuterol sulfate  (90 Base) MCG/ACT inhaler, Inhale 2 puffs Every 4 (Four) Hours As Needed for Wheezing., Disp: 18 g, Rfl: 2    aspirin 81 MG EC tablet, Take 1 tablet by mouth Daily., Disp: 30 tablet, Rfl: 3    fluticasone (FLONASE) 50 MCG/ACT nasal spray, Administer 2 sprays into the nostril(s) as directed by provider Daily., Disp: 16 g, Rfl: 5    hydrALAZINE (APRESOLINE) 25 MG tablet, Take 1 tablet by mouth twice daily, Disp: 180 tablet, Rfl: 3    losartan (COZAAR) 50 MG tablet, Take 1 tablet by mouth twice daily, Disp: 180 tablet, Rfl: 3    pantoprazole (PROTONIX) 40 MG EC tablet, Take 1 tablet by mouth 2 (Two) Times a Day., Disp: 180 tablet, Rfl: 3    simvastatin (ZOCOR) 40 MG tablet, TAKE 1 TABLET BY MOUTH ONCE DAILY AT NIGHT, Disp: 90 tablet, Rfl: 3    terazosin (HYTRIN) 5 MG capsule, Take 1 capsule by mouth Every Night., Disp: 90 capsule, Rfl: 3    amoxicillin-clavulanate (AUGMENTIN) 875-125 MG per tablet, Take 1 tablet by mouth 2 (Two) Times a Day., Disp: 20 tablet, Rfl: 0           RADHA Lucio 1/29/2025 13:45 EST  This note has been electronically signed

## 2025-01-29 NOTE — PATIENT INSTRUCTIONS
Bilateral ear irrigation  Augmentin 875 twice daily x 10 days with food  Continue Flonase consider Zyrtec in the morning  Wear mask when outside  Follow-up if no improvement

## 2025-02-01 ENCOUNTER — TELEPHONE (OUTPATIENT)
Dept: FAMILY MEDICINE CLINIC | Facility: CLINIC | Age: 80
End: 2025-02-01
Payer: MEDICARE

## 2025-02-01 NOTE — TELEPHONE ENCOUNTER
Patient called on-call service stating that he has been having some diarrhea since starting Augmentin.  He states that it is quite watery.  I advised him that diarrhea is a common side effect with antibiotics.  I want him to start a probiotic and to possibly eat some yogurt.  If the diarrhea continues he is to follow-up with his primary care provider about switching antibiotic.

## 2025-02-06 ENCOUNTER — OFFICE VISIT (OUTPATIENT)
Dept: FAMILY MEDICINE CLINIC | Facility: CLINIC | Age: 80
End: 2025-02-06
Payer: MEDICARE

## 2025-02-06 VITALS
BODY MASS INDEX: 27.16 KG/M2 | SYSTOLIC BLOOD PRESSURE: 126 MMHG | HEIGHT: 66 IN | WEIGHT: 169 LBS | HEART RATE: 71 BPM | TEMPERATURE: 97.1 F | DIASTOLIC BLOOD PRESSURE: 74 MMHG | OXYGEN SATURATION: 96 %

## 2025-02-06 DIAGNOSIS — H61.23 BILATERAL IMPACTED CERUMEN: Primary | ICD-10-CM

## 2025-02-06 PROCEDURE — 3074F SYST BP LT 130 MM HG: CPT | Performed by: NURSE PRACTITIONER

## 2025-02-06 PROCEDURE — 69209 REMOVE IMPACTED EAR WAX UNI: CPT | Performed by: NURSE PRACTITIONER

## 2025-02-06 PROCEDURE — 1126F AMNT PAIN NOTED NONE PRSNT: CPT | Performed by: NURSE PRACTITIONER

## 2025-02-06 PROCEDURE — 3078F DIAST BP <80 MM HG: CPT | Performed by: NURSE PRACTITIONER

## 2025-02-06 PROCEDURE — 99212 OFFICE O/P EST SF 10 MIN: CPT | Performed by: NURSE PRACTITIONER

## 2025-02-06 NOTE — PROGRESS NOTES
"    Chong Manuel is a 79 y.o. male.     History of Present Illness  79-year-old white male patient of Dr. Hearn who is here last week with cerebral impaction placed on Debrox eardrops.  Patient comes in today to have ears were irrigated.  Ears were successfully irrigated ear canals are red from the wax being there for so long but TMs are just bulging with clear fluid.  Patient states she can hear some much better now.  I recommend the patient get ears cleaned twice a year    Vital signs are stable            Bilateral ear irrigation  Have ears irrigated twice a year       The following portions of the patient's history were reviewed and updated as appropriate: allergies, current medications, past medical history, past social history, past surgical history, and problem list.    Vitals:    02/06/25 0956   BP: 126/74   BP Location: Right arm   Patient Position: Sitting   Cuff Size: Adult   Pulse: 71   Temp: 97.1 °F (36.2 °C)   TempSrc: Infrared   SpO2: 96%   Weight: 76.7 kg (169 lb)   Height: 167.6 cm (65.98\")       Past Medical History:   Diagnosis Date    Anemia     GERD (gastroesophageal reflux disease)     History of transfusion     Hyperlipidemia     Hypertension     Lung nodules     left     Pneumonia of both lower lobes due to infectious organism 04/20/2023    Shortness of breath 04/19/2021    Stroke 2011     Past Surgical History:   Procedure Laterality Date    AORTIC VALVE REPAIR/REPLACEMENT N/A 8/5/2021    Procedure: AORTIC VALVE REPAIR/REPLACEMENT;  Surgeon: Nael Padilla MD;  Location: [facility];  Service: Cardiothoracic;  Laterality: N/A;    CARDIAC CATHETERIZATION N/A 4/27/2021    Procedure: Left Heart Cath;  Surgeon: Darian Fajardo MD;  Location: [facility] CATH INVASIVE LOCATION;  Service: Cardiovascular;  Laterality: N/A;    CARDIAC CATHETERIZATION N/A 4/27/2021    Procedure: Right Heart Cath;  Surgeon: Darian Fajardo MD;  Location: [facility] CATH INVASIVE LOCATION;  Service: Cardiovascular;  " Laterality: N/A;    COLONOSCOPY      COLONOSCOPY N/A 7/27/2021    Procedure: COLONOSCOPY with polypectomy;  Surgeon: CIRA Pope MD;  Location: [facility] ENDOSCOPY;  Service: Gastroenterology;  Laterality: N/A;  post op: diverticulosis, polyp    ENDOSCOPY N/A 7/26/2021    Procedure: ESOPHAGOGASTRODUODENOSCOPY;  Surgeon: CIRA Pope MD;  Location: [facility] ENDOSCOPY;  Service: Gastroenterology;  Laterality: N/A;  HH, gastric polyps    HERNIA REPAIR       Family History   Problem Relation Age of Onset    Heart attack Mother     Heart failure Mother     Cancer Father      Immunization History   Administered Date(s) Administered    ABRYSVO (RSV, 60+ or pregnant women 32-36 wks) 10/24/2024    COVID-19 (MODERNA) 1st,2nd,3rd Dose Monovalent 01/21/2021, 02/22/2021, 11/16/2021, 07/21/2022    Fluzone  >6mos 10/01/2015    Fluzone High-Dose 65+YRS 10/03/2016, 09/19/2017, 09/13/2018, 09/16/2019, 09/01/2020, 09/03/2024    Fluzone High-Dose 65+yrs 09/01/2020, 08/29/2022    H1N1 Inj Preservative Free 11/19/2009    Hep A / Hep B 11/04/2010, 11/15/2010, 11/29/2010    Influenza TIV (IM) 10/20/2012    Influenza, Unspecified 09/01/2022, 09/06/2023, 09/03/2024    Pneumococcal Conjugate 13-Valent (PCV13) 10/20/2012, 09/19/2017    Pneumococcal Polysaccharide (PPSV23) 01/28/2015    Shingrix 10/10/2023    Td (TDVAX) 11/04/2010       Office Visit on 01/29/2025   Component Date Value Ref Range Status    SARS Antigen 01/29/2025 Not Detected  Not Detected, Presumptive Negative Final    Influenza A Antigen CHADD 01/29/2025 Not Detected  Not Detected Final    Influenza B Antigen CHADD 01/29/2025 Not Detected  Not Detected Final    Internal Control 01/29/2025 Passed  Passed Final    Lot Number 01/29/2025 [id number]   Final    Expiration Date 01/29/2025 14,[id number]   Final         Review of Systems   Constitutional: Negative.    HENT: Negative.     Respiratory: Negative.     Cardiovascular: Negative.    Gastrointestinal: Negative.     Genitourinary: Negative.    Musculoskeletal: Negative.    Skin: Negative.    Neurological: Negative.    Psychiatric/Behavioral: Negative.         Objective   Physical Exam  Constitutional:       Appearance: Normal appearance.   HENT:      Head: Normocephalic.      Right Ear: There is impacted cerumen.      Left Ear: There is impacted cerumen.      Ears:      Comments: Ears successfully irrigated your canals are red from wax being on urinalysis along  Cardiovascular:      Rate and Rhythm: Normal rate and regular rhythm.      Pulses: Normal pulses.      Heart sounds: Normal heart sounds.   Pulmonary:      Effort: Pulmonary effort is normal.      Breath sounds: Normal breath sounds.   Abdominal:      General: Bowel sounds are normal.   Musculoskeletal:         General: Normal range of motion.   Skin:     General: Skin is warm.   Neurological:      General: No focal deficit present.      Mental Status: He is alert and oriented to person, place, and time.   Psychiatric:         Mood and Affect: Mood normal.         Behavior: Behavior normal.         Ear Cerumen Removal    Date/Time: 2/6/2025 10:15 AM    Performed by: Marli Lane APRN  Authorized by: Marli Lane APRN    Anesthesia:  Local Anesthetic: none  Location details: right ear and left ear  Patient tolerance: patient tolerated the procedure well with no immediate complications  Procedure type: irrigation   Sedation:  Patient sedated: no          Assessment & Plan   There are no diagnoses linked to this encounter.       Current Outpatient Medications:     acetaminophen (TYLENOL) 325 MG tablet, Take 2 tablets by mouth Every 4 (Four) Hours As Needed for Mild Pain ., Disp: , Rfl:     albuterol sulfate  (90 Base) MCG/ACT inhaler, Inhale 2 puffs Every 4 (Four) Hours As Needed for Wheezing., Disp: 18 g, Rfl: 2    amoxicillin-clavulanate (AUGMENTIN) 875-125 MG per tablet, Take 1 tablet by mouth 2 (Two) Times a Day., Disp: 20 tablet, Rfl: 0    aspirin 81  MG EC tablet, Take 1 tablet by mouth Daily., Disp: 30 tablet, Rfl: 3    carbamide peroxide (Debrox) 6.5 % otic solution, 5 drops twice a day for 5 days then make appointment for ear irrigation, Disp: 15 mL, Rfl: 0    fluticasone (FLONASE) 50 MCG/ACT nasal spray, Administer 2 sprays into the nostril(s) as directed by provider Daily., Disp: 16 g, Rfl: 5    hydrALAZINE (APRESOLINE) 25 MG tablet, Take 1 tablet by mouth twice daily, Disp: 180 tablet, Rfl: 3    losartan (COZAAR) 50 MG tablet, Take 1 tablet by mouth twice daily, Disp: 180 tablet, Rfl: 3    pantoprazole (PROTONIX) 40 MG EC tablet, Take 1 tablet by mouth 2 (Two) Times a Day., Disp: 180 tablet, Rfl: 3    simvastatin (ZOCOR) 40 MG tablet, TAKE 1 TABLET BY MOUTH ONCE DAILY AT NIGHT, Disp: 90 tablet, Rfl: 3    terazosin (HYTRIN) 5 MG capsule, Take 1 capsule by mouth Every Night., Disp: 90 capsule, Rfl: 3           Marli Lane, RADHA 2/6/2025 10:13 EST  This note has been electronically signed

## 2025-03-04 ENCOUNTER — OFFICE VISIT (OUTPATIENT)
Dept: FAMILY MEDICINE CLINIC | Facility: CLINIC | Age: 80
End: 2025-03-04
Payer: MEDICARE

## 2025-03-04 VITALS
BODY MASS INDEX: 33.69 KG/M2 | HEIGHT: 60 IN | WEIGHT: 171.6 LBS | HEART RATE: 93 BPM | TEMPERATURE: 97.8 F | OXYGEN SATURATION: 96 % | DIASTOLIC BLOOD PRESSURE: 75 MMHG | SYSTOLIC BLOOD PRESSURE: 144 MMHG

## 2025-03-04 DIAGNOSIS — H69.93 DYSFUNCTION OF BOTH EUSTACHIAN TUBES: ICD-10-CM

## 2025-03-04 DIAGNOSIS — R05.1 ACUTE COUGH: Primary | ICD-10-CM

## 2025-03-04 DIAGNOSIS — T78.40XD ALLERGY, SUBSEQUENT ENCOUNTER: ICD-10-CM

## 2025-03-04 RX ORDER — FLUTICASONE PROPIONATE 50 MCG
2 SPRAY, SUSPENSION (ML) NASAL DAILY
Qty: 16 G | Refills: 5 | Status: SHIPPED | OUTPATIENT
Start: 2025-03-04

## 2025-03-04 RX ORDER — PSEUDOEPHEDRINE HCL 30 MG/1
30 TABLET, FILM COATED ORAL EVERY 6 HOURS PRN
Qty: 60 TABLET | Refills: 2 | Status: SHIPPED | OUTPATIENT
Start: 2025-03-04

## 2025-03-04 NOTE — PROGRESS NOTES
"    Chong Manuel is a 79 y.o. male.     History of Present Illness  79-year-old white male patient of Dr. Hearn who I saw 2 weeks ago for sinusitis and facial antibiotics who comes in today complaining of cough.  He was negative for COVID influenza he has no other symptoms and examination reveals allergic rhinitis.  I placed him on low-dose Sudafed Flonase is already on an antihistamine and advising to wear mask outside.  He is taking NyQuil for his cough which does seem to be working well    Vital signs are stable              Continue NyQuil for cough  Zyrtec Flonase Sudafed  Wear mask outside  Follow-up if needed       The following portions of the patient's history were reviewed and updated as appropriate: allergies, current medications, past family history, past medical history, past social history, past surgical history, and problem list.    Vitals:    03/04/25 1354   BP: 144/75   BP Location: Left arm   Patient Position: Sitting   Cuff Size: Adult   Pulse: 93   Temp: 97.8 °F (36.6 °C)   TempSrc: Infrared   SpO2: 96%   Weight: 77.8 kg (171 lb 9.6 oz)   Height: 76.7 cm (30.2\")       Past Medical History:   Diagnosis Date    Anemia     GERD (gastroesophageal reflux disease)     History of transfusion     Hyperlipidemia     Hypertension     Lung nodules     left     Pneumonia of both lower lobes due to infectious organism 04/20/2023    Shortness of breath 04/19/2021    Stroke 2011     Past Surgical History:   Procedure Laterality Date    AORTIC VALVE REPAIR/REPLACEMENT N/A 8/5/2021    Procedure: AORTIC VALVE REPAIR/REPLACEMENT;  Surgeon: Nael Padilla MD;  Location: [facility] CVOR;  Service: Cardiothoracic;  Laterality: N/A;    CARDIAC CATHETERIZATION N/A 4/27/2021    Procedure: Left Heart Cath;  Surgeon: Darian Fajardo MD;  Location: [facility] CATH INVASIVE LOCATION;  Service: Cardiovascular;  Laterality: N/A;    CARDIAC CATHETERIZATION N/A 4/27/2021    Procedure: Right Heart Cath;  Surgeon: Darian Fajardo, " MD;  Location: [facility] CATH INVASIVE LOCATION;  Service: Cardiovascular;  Laterality: N/A;    COLONOSCOPY      COLONOSCOPY N/A 7/27/2021    Procedure: COLONOSCOPY with polypectomy;  Surgeon: CIRA Pope MD;  Location: [facility] ENDOSCOPY;  Service: Gastroenterology;  Laterality: N/A;  post op: diverticulosis, polyp    ENDOSCOPY N/A 7/26/2021    Procedure: ESOPHAGOGASTRODUODENOSCOPY;  Surgeon: CIRA Pope MD;  Location: [facility] ENDOSCOPY;  Service: Gastroenterology;  Laterality: N/A;  HH, gastric polyps    HERNIA REPAIR       Family History   Problem Relation Age of Onset    Heart attack Mother     Heart failure Mother     Cancer Father      Immunization History   Administered Date(s) Administered    ABRYSVO (RSV, 60+ or pregnant women 32-36 wks) 10/24/2024    COVID-19 (MODERNA) 1st,2nd,3rd Dose Monovalent 01/21/2021, 02/22/2021, 11/16/2021, 07/21/2022    Fluzone  >6mos 10/01/2015    Fluzone High-Dose 65+YRS 10/03/2016, 09/19/2017, 09/13/2018, 09/16/2019, 09/01/2020, 09/03/2024    Fluzone High-Dose 65+yrs 09/01/2020, 08/29/2022    H1N1 Inj Preservative Free 11/19/2009    Hep A / Hep B 11/04/2010, 11/15/2010, 11/29/2010    Influenza TIV (IM) 10/20/2012    Influenza, Unspecified 09/01/2022, 09/06/2023, 09/03/2024    Pneumococcal Conjugate 13-Valent (PCV13) 10/20/2012, 09/19/2017    Pneumococcal Polysaccharide (PPSV23) 01/28/2015    Shingrix 10/10/2023    Td (TDVAX) 11/04/2010       Office Visit on 01/29/2025   Component Date Value Ref Range Status    SARS Antigen 01/29/2025 Not Detected  Not Detected, Presumptive Negative Final    Influenza A Antigen CHADD 01/29/2025 Not Detected  Not Detected Final    Influenza B Antigen CHADD 01/29/2025 Not Detected  Not Detected Final    Internal Control 01/29/2025 Passed  Passed Final    Lot Number 01/29/2025 [id number]   Final    Expiration Date 01/29/2025 11,142,[id number]   Final         Review of Systems   Constitutional: Negative.    HENT: Negative.     Respiratory:  Positive for  cough.    Cardiovascular: Negative.    Gastrointestinal: Negative.    Genitourinary: Negative.    Musculoskeletal: Negative.    Skin: Negative.    Neurological: Negative.    Psychiatric/Behavioral: Negative.         Objective   Physical Exam  Constitutional:       Appearance: Normal appearance.   HENT:      Head: Normocephalic.      Nose:      Comments: Turbinates pink and boggy     Mouth/Throat:      Comments: Heavy postnasal drip  Cardiovascular:      Rate and Rhythm: Normal rate and regular rhythm.      Pulses: Normal pulses.      Heart sounds: Normal heart sounds.   Pulmonary:      Effort: Pulmonary effort is normal.      Breath sounds: Normal breath sounds.   Abdominal:      General: Bowel sounds are normal.   Musculoskeletal:         General: Normal range of motion.   Skin:     General: Skin is warm.   Neurological:      General: No focal deficit present.      Mental Status: He is alert and oriented to person, place, and time.   Psychiatric:         Mood and Affect: Mood normal.         Behavior: Behavior normal.         Procedures    Assessment & Plan   Diagnoses and all orders for this visit:    1. Acute cough (Primary)  -     POCT SARS-CoV-2 Antigen CHADD + Flu    2. Dysfunction of both eustachian tubes  -     fluticasone (FLONASE) 50 MCG/ACT nasal spray; Administer 2 sprays into the nostril(s) as directed by provider Daily.  Dispense: 16 g; Refill: 5    Other orders  -     pseudoephedrine (Sudafed) 30 MG tablet; Take 1 tablet by mouth Every 6 (Six) Hours As Needed for Congestion.  Dispense: 60 tablet; Refill: 2           Current Outpatient Medications:     fluticasone (FLONASE) 50 MCG/ACT nasal spray, Administer 2 sprays into the nostril(s) as directed by provider Daily., Disp: 16 g, Rfl: 5    acetaminophen (TYLENOL) 325 MG tablet, Take 2 tablets by mouth Every 4 (Four) Hours As Needed for Mild Pain ., Disp: , Rfl:     albuterol sulfate  (90 Base) MCG/ACT inhaler, Inhale 2 puffs Every 4 (Four) Hours  As Needed for Wheezing., Disp: 18 g, Rfl: 2    aspirin 81 MG EC tablet, Take 1 tablet by mouth Daily., Disp: 30 tablet, Rfl: 3    carbamide peroxide (Debrox) 6.5 % otic solution, 5 drops twice a day for 5 days then make appointment for ear irrigation, Disp: 15 mL, Rfl: 0    hydrALAZINE (APRESOLINE) 25 MG tablet, Take 1 tablet by mouth twice daily, Disp: 180 tablet, Rfl: 3    losartan (COZAAR) 50 MG tablet, Take 1 tablet by mouth twice daily, Disp: 180 tablet, Rfl: 3    pantoprazole (PROTONIX) 40 MG EC tablet, Take 1 tablet by mouth 2 (Two) Times a Day., Disp: 180 tablet, Rfl: 3    pseudoephedrine (Sudafed) 30 MG tablet, Take 1 tablet by mouth Every 6 (Six) Hours As Needed for Congestion., Disp: 60 tablet, Rfl: 2    simvastatin (ZOCOR) 40 MG tablet, TAKE 1 TABLET BY MOUTH ONCE DAILY AT NIGHT, Disp: 90 tablet, Rfl: 3    terazosin (HYTRIN) 5 MG capsule, Take 1 capsule by mouth Every Night., Disp: 90 capsule, Rfl: 3           RADHA Lucio 3/4/2025 15:26 EST  This note has been electronically signed

## 2025-03-31 ENCOUNTER — TELEPHONE (OUTPATIENT)
Dept: FAMILY MEDICINE CLINIC | Facility: CLINIC | Age: 80
End: 2025-03-31
Payer: MEDICARE

## 2025-03-31 DIAGNOSIS — E78.2 MIXED HYPERLIPIDEMIA: Primary | ICD-10-CM

## 2025-03-31 DIAGNOSIS — I10 ESSENTIAL HYPERTENSION: ICD-10-CM

## 2025-03-31 DIAGNOSIS — R73.9 HYPERGLYCEMIA: ICD-10-CM

## 2025-03-31 DIAGNOSIS — D50.0 IRON DEFICIENCY ANEMIA DUE TO CHRONIC BLOOD LOSS: ICD-10-CM

## 2025-03-31 NOTE — TELEPHONE ENCOUNTER
Caller: Chong Manuel    Relationship: Self    Best call back number: [phone]    What orders are you requesting (i.e. lab or imaging): ROUTINE LABS     In what timeframe would the patient need to come in: PRIOR TO WELLNESS VISIT     Additional notes: PATIENT WOULD LIKE A CALL BACK TO SCHEDULE ONCE ORDERS ARE ACTIVE.

## 2025-03-31 NOTE — TELEPHONE ENCOUNTER
Lab orders are in the computer.  He can do them a few days before the visit at the end of May.  Thanks!

## 2025-04-04 ENCOUNTER — OFFICE VISIT (OUTPATIENT)
Dept: FAMILY MEDICINE CLINIC | Facility: CLINIC | Age: 80
End: 2025-04-04
Payer: MEDICARE

## 2025-04-04 VITALS
HEIGHT: 66 IN | OXYGEN SATURATION: 97 % | DIASTOLIC BLOOD PRESSURE: 74 MMHG | HEART RATE: 70 BPM | TEMPERATURE: 97.3 F | BODY MASS INDEX: 27.48 KG/M2 | SYSTOLIC BLOOD PRESSURE: 153 MMHG | WEIGHT: 171 LBS

## 2025-04-04 DIAGNOSIS — R05.1 ACUTE COUGH: Primary | ICD-10-CM

## 2025-04-04 DIAGNOSIS — J01.10 ACUTE NON-RECURRENT FRONTAL SINUSITIS: ICD-10-CM

## 2025-04-04 RX ORDER — AZITHROMYCIN 250 MG/1
TABLET, FILM COATED ORAL
Qty: 6 TABLET | Refills: 0 | Status: SHIPPED | OUTPATIENT
Start: 2025-04-04

## 2025-04-04 RX ORDER — BENZONATATE 100 MG/1
100 CAPSULE ORAL 3 TIMES DAILY PRN
Qty: 30 CAPSULE | Refills: 0 | Status: SHIPPED | OUTPATIENT
Start: 2025-04-04

## 2025-04-04 NOTE — PROGRESS NOTES
Chief Complaint  Cough and Generalized Body Aches    Subjective        Chong Manuel presents to [facility]    History of Present Illness  The patient is an 79-year-old male who presents for evaluation of cough, postnasal drip, and constipation.    He has been experiencing a runny nose since Monday, which he suspects may be due to exposure to his grandson who has bronchitis. He reports clear nasal discharge but no sinus pain or pressure. He does not have a sore throat. He occasionally experiences itchy eyes, which he attributes to pollen exposure. He has been taking Zyrtec and Flonase for his allergies, but these have not been effective in managing his current symptoms.    He also reports mild shortness of breath, which he attributes to his emphysema. He experienced wheezing this morning while lying in bed, which was relieved by coughing. He has been coughing frequently but does not have any chest pain or heart palpitations. He has a history of emphysema.    He has been producing clear sputum with his cough. He reports no fever but does experience fatigue but no more than usual, which he attributes to his age. He has been feeling slightly achy today and reports mild ear pain. He experienced lightheadedness this morning, which he believes may be due to taking NyQuil last night for his cough. He took Mucinex DM this morning, which provided some relief from his cough. He also took Benadryl this morning, which did not seem to help. He has not taken Tessalon Perles for his cough in the past.    He experienced constipation for a couple of days, which was resolved with medication. He reports frequent urination.    Supplemental Information  He experiences chills every evening, which Dr. Fischer has attributed to his blood pressure medication. He has a new aortic valve and is doing well with it.    MEDICATIONS  Current: NyQuil, Mucinex DM, Benadryl, Zyrtec,  "Flonase    Results  Laboratory Studies  Swabs are normal.    Cough  Associated symptoms include ear pain, myalgias, postnasal drip, rhinorrhea and shortness of breath. Pertinent negatives include no chest pain, fever, sore throat or wheezing.       Objective   Vital Signs:  /74 (BP Location: Left arm, Patient Position: Sitting, Cuff Size: Adult)   Pulse 70   Temp 97.3 °F (36.3 °C) (Infrared)   Ht 167.6 cm (65.98\")   Wt 77.6 kg (171 lb)   SpO2 97%   BMI 27.61 kg/m²   Estimated body mass index is 27.61 kg/m² as calculated from the following:    Height as of this encounter: 167.6 cm (65.98\").    Weight as of this encounter: 77.6 kg (171 lb).            Review of Systems   Constitutional:  Negative for fatigue and fever.   HENT:  Positive for congestion, ear pain, postnasal drip and rhinorrhea. Negative for sinus pressure, sinus pain and sore throat.    Eyes: Negative.    Respiratory:  Positive for cough and shortness of breath. Negative for chest tightness and wheezing.    Cardiovascular:  Negative for chest pain and palpitations.   Gastrointestinal: Negative.    Genitourinary: Negative.    Musculoskeletal:  Positive for myalgias.        Physical Exam  Vitals reviewed.   Constitutional:       Appearance: Normal appearance.   HENT:      Head: Normocephalic and atraumatic.      Right Ear: Ear canal and external ear normal.      Left Ear: Tympanic membrane, ear canal and external ear normal.      Ears:      Comments: Some bulging behind the right tympanic membrane     Nose: Congestion and rhinorrhea present.      Mouth/Throat:      Mouth: Mucous membranes are moist.      Pharynx: Posterior oropharyngeal erythema present.   Eyes:      Conjunctiva/sclera: Conjunctivae normal.   Cardiovascular:      Rate and Rhythm: Normal rate and regular rhythm.      Pulses: Normal pulses.      Heart sounds: Normal heart sounds.   Pulmonary:      Effort: Pulmonary effort is normal.      Breath sounds: Normal breath sounds. No " stridor. No wheezing, rhonchi or rales.   Skin:     General: Skin is warm and dry.   Neurological:      Mental Status: He is alert and oriented to person, place, and time.   Psychiatric:         Mood and Affect: Mood normal.         Behavior: Behavior normal.         Thought Content: Thought content normal.         Judgment: Judgment normal.          Physical Exam  There is a little bit of fluid behind the eardrum. The throat is slightly red.  Lungs are clear.    Result Review :                Assessment and Plan   Diagnoses and all orders for this visit:    1. Acute cough (Primary)  -     POCT SARS-CoV-2 + Flu Antigen CHADD    2. Acute non-recurrent frontal sinusitis    Other orders  -     benzonatate (Tessalon Perles) 100 MG capsule; Take 1 capsule by mouth 3 (Three) Times a Day As Needed for Cough.  Dispense: 30 capsule; Refill: 0  -     azithromycin (Zithromax Z-Rob) 250 MG tablet; Take 2 tablets the first day, then 1 tablet daily for 4 days.  Dispense: 6 tablet; Refill: 0             Assessment & Plan  1. Cough.  The cough is likely due to postnasal drip and irritation. His lungs sound clear with no wheezing, indicating no bronchitis or pneumonia. A prescription for Tessalon Perles will be provided to help numb the throat and alleviate the cough. A Z-Rob (azithromycin) will also be prescribed as a precautionary measure. He is advised to continue taking Zyrtec and using Flonase. If symptoms worsen or do not improve, he should inform the clinic. In case of severe symptoms over the weekend, he should seek immediate medical attention at an urgent care facility or emergency room.    2. Postnasal drip.  The postnasal drip is contributing to the cough and throat irritation. He is advised to continue using Zyrtec and Flonase. A Z-Rob (azithromycin) will be prescribed to help clear any potential infection.    3. Constipation.  He experienced constipation for a couple of days but managed it with over-the-counter  medication, which helped.    PROCEDURE  The patient has a new aortic valve.      Follow Up   Return if symptoms worsen or fail to improve.  Patient was given instructions and counseling regarding his condition or for health maintenance advice. Please see specific information pulled into the AVS if appropriate.         This note has been electronically signed.   Dia Young PA-C 14:03 EDT 04/04/25   Patient or patient representative verbalized consent for the use of Ambient Listening during the visit with  Dia Young PA-C for chart documentation. 4/4/2025  14:04 EDT    The encounter note is created with the use of AI technology.  I do apologize if there are typos and/or confusion within the note.  Please feel free to contact me or my office with any questions or concerns.

## 2025-04-15 ENCOUNTER — OFFICE VISIT (OUTPATIENT)
Dept: FAMILY MEDICINE CLINIC | Facility: CLINIC | Age: 80
End: 2025-04-15
Payer: MEDICARE

## 2025-04-15 VITALS
TEMPERATURE: 98 F | WEIGHT: 166 LBS | HEART RATE: 94 BPM | HEIGHT: 66 IN | SYSTOLIC BLOOD PRESSURE: 133 MMHG | BODY MASS INDEX: 26.68 KG/M2 | RESPIRATION RATE: 18 BRPM | DIASTOLIC BLOOD PRESSURE: 65 MMHG | OXYGEN SATURATION: 90 %

## 2025-04-15 DIAGNOSIS — J01.00 ACUTE NON-RECURRENT MAXILLARY SINUSITIS: ICD-10-CM

## 2025-04-15 DIAGNOSIS — R05.1 ACUTE COUGH: Primary | ICD-10-CM

## 2025-04-15 DIAGNOSIS — M79.10 MYALGIA DUE TO STATIN: ICD-10-CM

## 2025-04-15 DIAGNOSIS — T46.6X5A MYALGIA DUE TO STATIN: ICD-10-CM

## 2025-04-15 NOTE — PROGRESS NOTES
Answers submitted by the patient for this visit:  Cough Questionnaire (Submitted on 4/14/2025)  Chief Complaint: Cough  Onset: in the past 7 days  Progression since onset: worsening  Frequency: hourly  Cough characteristics: productive of yellow sputum  nasal congestion: Yes  postnasal drip: Yes  rhinorrhea: Yes  shortness of breath: Yes  wheezing: Yes  Aggravated by: pollens  Risk factors for lung disease: smoking/tobacco exposure  Subjective   Chong Manuel is a 79 y.o. male.   Chief Complaint   Patient presents with    Cough    Sinus Problem       History of Present Illness   79 y.o. male   History of Present Illness  The patient presents for evaluation of a sinus infection, cough, and lower extremity pain.    A sinus infection was diagnosed on 04/04/2025, accompanied by a persistent cough. Despite completing a course of Z-Rob, no improvement in symptoms has been noted. There has been no exacerbation of symptoms, but daily chills in the afternoon continue. Nasal discharge is reported, predominantly green from the right nostril, with occasional yellow sputum from the chest. A minor episode of epistaxis occurred this morning. Mucinex is available at home.    Pain in the lower extremities is reported, particularly noticeable during early morning hours while in bed. Halving the cholesterol medication dosage has alleviated the pain. Cholesterol levels are currently within normal limits.      Patient Active Problem List    Diagnosis Date Noted    Upper respiratory tract infection 06/20/2024    White coat syndrome with diagnosis of hypertension 05/15/2023    Acute cough 04/20/2023    SOB (shortness of breath) 04/20/2023    Primary osteoarthritis involving multiple joints 02/13/2023    BPH with obstruction/lower urinary tract symptoms 11/08/2022    S/P AVR (tissue) by Dr. Padilla 8/5/2021 08/10/2021    GI bleed 07/25/2021    Shortness of breath 04/19/2021    Chest discomfort 04/19/2021     Note Last Updated: 4/19/2021      Added automatically from request for surgery [id number]      Aortic stenosis, severe 04/19/2021     Note Last Updated: 4/19/2021     Added automatically from request for surgery [id number]      CVA (cerebral vascular accident) 12/03/2019     Note Last Updated: 11/8/2022     2011 - took 325 ASA x years      Acute on chronic blood loss anemia 12/02/2019    Essential hypertension 12/02/2019    Mixed hyperlipidemia 12/02/2019    Chronic GERD 12/02/2019    Iron deficiency anemia 12/09/2013    Pure hypercholesterolemia 12/09/2013    Solitary pulmonary nodule 12/09/2013     Note Last Updated: 11/8/2022     On left lung.  Saw [facility] - followed, determined to be benign.  Discovered in 2011             Past Surgical History:   Procedure Laterality Date    AORTIC VALVE REPAIR/REPLACEMENT N/A 8/5/2021    Procedure: AORTIC VALVE REPAIR/REPLACEMENT;  Surgeon: Nael Padilla MD;  Location: [facility] CVOR;  Service: Cardiothoracic;  Laterality: N/A;    CARDIAC CATHETERIZATION N/A 4/27/2021    Procedure: Left Heart Cath;  Surgeon: Darian Fajardo MD;  Location: [facility] CATH INVASIVE LOCATION;  Service: Cardiovascular;  Laterality: N/A;    CARDIAC CATHETERIZATION N/A 4/27/2021    Procedure: Right Heart Cath;  Surgeon: Darian Fajardo MD;  Location: [facility] CATH INVASIVE LOCATION;  Service: Cardiovascular;  Laterality: N/A;    COLONOSCOPY      COLONOSCOPY N/A 7/27/2021    Procedure: COLONOSCOPY with polypectomy;  Surgeon: CIRA Pope MD;  Location: [facility] ENDOSCOPY;  Service: Gastroenterology;  Laterality: N/A;  post op: diverticulosis, polyp    ENDOSCOPY N/A 7/26/2021    Procedure: ESOPHAGOGASTRODUODENOSCOPY;  Surgeon: CIRA Pope MD;  Location: [facility] ENDOSCOPY;  Service: Gastroenterology;  Laterality: N/A;  HH, gastric polyps    HERNIA REPAIR       Current Outpatient Medications on File Prior to Visit   Medication Sig    acetaminophen (TYLENOL) 325 MG tablet Take 2 tablets by mouth Every 4 (Four) Hours As Needed for Mild Pain .     albuterol sulfate  (90 Base) MCG/ACT inhaler Inhale 2 puffs Every 4 (Four) Hours As Needed for Wheezing.    aspirin 81 MG EC tablet Take 1 tablet by mouth Daily.    fluticasone (FLONASE) 50 MCG/ACT nasal spray Administer 2 sprays into the nostril(s) as directed by provider Daily.    hydrALAZINE (APRESOLINE) 25 MG tablet Take 1 tablet by mouth twice daily    losartan (COZAAR) 50 MG tablet Take 1 tablet by mouth twice daily    pantoprazole (PROTONIX) 40 MG EC tablet Take 1 tablet by mouth 2 (Two) Times a Day.    terazosin (HYTRIN) 5 MG capsule Take 1 capsule by mouth Every Night.    [DISCONTINUED] simvastatin (ZOCOR) 40 MG tablet TAKE 1 TABLET BY MOUTH ONCE DAILY AT NIGHT    [DISCONTINUED] azithromycin (Zithromax Z-Rob) 250 MG tablet Take 2 tablets the first day, then 1 tablet daily for 4 days.    [DISCONTINUED] benzonatate (Tessalon Perles) 100 MG capsule Take 1 capsule by mouth 3 (Three) Times a Day As Needed for Cough.    [DISCONTINUED] pseudoephedrine (Sudafed) 30 MG tablet Take 1 tablet by mouth Every 6 (Six) Hours As Needed for Congestion. (Patient not taking: Reported on 2025)     No current facility-administered medications on file prior to visit.     Allergies   Allergen Reactions    Sulfa Antibiotics Other (See Comments)     Unable to specify     Social History     Socioeconomic History    Marital status:    Tobacco Use    Smoking status: Former     Current packs/day: 0.00     Average packs/day: 1.5 packs/day for 54.4 years (81.6 ttl pk-yrs)     Types: Cigarettes     Start date: 1957     Quit date: 2011     Years since quittin.8     Passive exposure: Past    Smokeless tobacco: Never   Vaping Use    Vaping status: Never Used   Substance and Sexual Activity    Alcohol use: Not Currently     Comment: quit in     Drug use: No    Sexual activity: Not Currently     Partners: Female     Family History   Problem Relation Age of Onset    Heart attack Mother     Heart failure  "Mother     Cancer Father        Review of Systems    Objective   /65 (BP Location: Left arm, Patient Position: Sitting, Cuff Size: Adult)   Pulse 94   Temp 98 °F (36.7 °C) (Infrared)   Resp 18   Ht 167.6 cm (65.98\")   Wt 75.3 kg (166 lb)   SpO2 90%   BMI 26.81 kg/m²   Physical Exam  Constitutional:       Appearance: He is well-developed.      Comments:      HENT:      Head: Normocephalic and atraumatic.      Nose: Congestion present.      Right Sinus: Maxillary sinus tenderness present.      Left Sinus: Maxillary sinus tenderness present.   Eyes:      Conjunctiva/sclera: Conjunctivae normal.   Cardiovascular:      Rate and Rhythm: Normal rate and regular rhythm.      Heart sounds: No murmur heard.  Pulmonary:      Effort: Pulmonary effort is normal. No respiratory distress.      Breath sounds: No wheezing, rhonchi or rales.   Musculoskeletal:         General: Normal range of motion.      Cervical back: Normal range of motion.      Right lower leg: No edema.      Left lower leg: No edema.   Skin:     General: Skin is warm and dry.      Findings: No rash.   Neurological:      Mental Status: He is alert and oriented to person, place, and time.   Psychiatric:         Behavior: Behavior normal.       Physical Exam  Nose: Septum midline, nares patent, mucosa normal  Respiratory: Clear to auscultation, no wheezing, rales or rhonchi        Results  Imaging   - Chest x-ray: No signs of pneumonia.   - Echocardiogram: 12/31/2024, Normal left ventricular size and function, mild thickening of the heart muscle (left ventricular hypertrophy), bioprosthetic aortic valve noted, aortic root is normal diameter, aortic valve seated well.          Assessment & Plan   Diagnoses and all orders for this visit:    1. Acute cough (Primary)  -     XR Chest PA & Lateral    2. Acute non-recurrent maxillary sinusitis  -     amoxicillin-clavulanate (AUGMENTIN) 875-125 MG per tablet; Take 1 tablet by mouth 2 (Two) Times a Day for 10 " days.  Dispense: 20 tablet; Refill: 0    3. Myalgia due to statin      Assessment & Plan  1. Maxillary sinusitis.  - Chest x-ray shows no abnormalities, indicating the cough may be due to postnasal drainage rather than a pulmonary issue.  - Z-Rob previously prescribed was insufficiently potent to eradicate the sinus infection.  - Augmentin 875 mg prescribed, to be taken twice daily for 10 days. Over-the-counter Mucinex recommended for 4 to 5 days to facilitate mucus breakdown in the sinuses. Salt water nasal spray advised to aid in rinsing out the sinuses.  - If symptoms persist, further evaluation will be necessary.    2. Lower extremity pain.  - Cholesterol levels are within normal range.  - Lower extremity pain has improved since reducing simvastatin dosage.  - Simvastatin will be discontinued until the next cholesterol check in late 05/2025. If cholesterol levels remain stable and lower extremity pain resolves, the medication will be permanently discontinued.  - If cholesterol levels increase significantly, an alternative medication with a lower risk of causing leg pain will be considered.    Follow-up  - Follow-up scheduled for late 05/2025.        Call with any problems or concerns before next visit       Return keep appt as planned in May.  Patient or patient representative verbalized consent for the use of Ambient Listening during the visit with  Aaliyah Hearn MD for chart documentation. 4/15/2025  09:01 EDT    Part of this note may be an electronic transcription/translation of spoken language to printed text using the Dragon Dictation System    Aaliyah Hearn [id number]/15/[id number] EDT  This note has been electronically signed

## 2025-04-28 ENCOUNTER — OFFICE VISIT (OUTPATIENT)
Age: 80
End: 2025-04-28
Payer: MEDICARE

## 2025-04-28 VITALS
HEIGHT: 65 IN | HEART RATE: 68 BPM | WEIGHT: 166 LBS | BODY MASS INDEX: 27.66 KG/M2 | OXYGEN SATURATION: 95 % | DIASTOLIC BLOOD PRESSURE: 62 MMHG | SYSTOLIC BLOOD PRESSURE: 142 MMHG

## 2025-04-28 DIAGNOSIS — E78.2 MIXED HYPERLIPIDEMIA: ICD-10-CM

## 2025-04-28 DIAGNOSIS — I35.0 AORTIC STENOSIS, SEVERE: ICD-10-CM

## 2025-04-28 DIAGNOSIS — Z95.2 S/P AVR: ICD-10-CM

## 2025-04-28 DIAGNOSIS — I10 ESSENTIAL HYPERTENSION: Primary | ICD-10-CM

## 2025-04-28 DIAGNOSIS — I63.9 CEREBROVASCULAR ACCIDENT (CVA), UNSPECIFIED MECHANISM: Chronic | ICD-10-CM

## 2025-04-28 PROCEDURE — 3077F SYST BP >= 140 MM HG: CPT | Performed by: STUDENT IN AN ORGANIZED HEALTH CARE EDUCATION/TRAINING PROGRAM

## 2025-04-28 PROCEDURE — 3078F DIAST BP <80 MM HG: CPT | Performed by: STUDENT IN AN ORGANIZED HEALTH CARE EDUCATION/TRAINING PROGRAM

## 2025-04-28 PROCEDURE — 99214 OFFICE O/P EST MOD 30 MIN: CPT | Performed by: STUDENT IN AN ORGANIZED HEALTH CARE EDUCATION/TRAINING PROGRAM

## 2025-04-28 RX ORDER — HYDRALAZINE HYDROCHLORIDE 25 MG/1
25 TABLET, FILM COATED ORAL 3 TIMES DAILY
Qty: 180 TABLET | Refills: 3 | Status: SHIPPED | OUTPATIENT
Start: 2025-04-28

## 2025-04-28 NOTE — PROGRESS NOTES
"Cardiology Office Visit      Encounter Date:  2025    PATIENT IDENTIFICATION    Name: Chong Manuel  Age: 79 y.o. Sex: male : 1945  MRN: [id number]    Reason For Followup:  Valvular heart disease    Brief Clinical History:  Patient is a 79 y.o.  male  coming to cardiology office for follow up.    Patient has medical history of severe aortic stenosis status post bioprosthetic AVR, hypertension, hyperlipidemia, chronic right bundle branch block.    Today patient complains of elevated blood pressure at home.  He brings his home BP readings which have been ranging from 120s to 140s systolic BP.  Otherwise he denies any other symptoms, denies chest pain, shortness of breath, palpitations, dizziness, lightheadedness, presyncope or syncope.    Current medications include losartan 100 mg daily, hydralazine 25 mg twice daily, aspirin 81, home inhalers, pantoprazole    Recent labs in patient's chart:  Creatinine 1.09, sodium 139, potassium 4.7    Prior cardiology workup.  I have personally interpreted patient's recent TTE in 2024 which showed bioprosthetic AVR with normal appearance and preserved ejection fraction.    Assessment & Plan    Impressions:  Hypertension  Aortic stenosis status post bioprosthetic AVR  Mild nonobstructive CAD  Right bundle branch block  Hyperlipidemia    Recommendations:  Change hydralazine from 25 twice daily to 25 mg every 8 hours.  Continue losartan 100 mg daily.  Continue aspirin 81 mg daily.  Advised patient to continue engaging in low-sodium diet.  Follow-up in 6 months    Diagnoses and all orders for this visit:    1. Essential hypertension [I10] (Primary)          Objective:    Vitals:  Vitals:    25 1137   BP: 142/62   BP Location: Left arm   Patient Position: Sitting   Cuff Size: Adult   Pulse: 68   SpO2: 95%   Weight: 75.3 kg (166 lb)   Height: 165.1 cm (65\")     Body mass index is 27.62 kg/m².      Physical Exam:  General: Alert, cooperative, no " distress, appears stated age  Lungs:  Clear to auscultation bilaterally, no wheezes, rhonchi or rales are noted  Chest wall: No tenderness  Heart::  Regular rate and rhythm, S1 and S2 normal, soft systolic murmur left upper sternal border murmur.  No rub or gallop  Abdomen: Soft, nontender, nondistended, bowel sounds active  Extremities: No cyanosis, clubbing, or edema  Pulses: 2+ and symmetric all extremities  Neuro/psych: No gross focal deficits        Allergies:  Allergies   Allergen Reactions    Sulfa Antibiotics Other (See Comments)     Unable to specify       Medication Review:     Current Outpatient Medications:     acetaminophen (TYLENOL) 325 MG tablet, Take 2 tablets by mouth Every 4 (Four) Hours As Needed for Mild Pain ., Disp: , Rfl:     albuterol sulfate  (90 Base) MCG/ACT inhaler, Inhale 2 puffs Every 4 (Four) Hours As Needed for Wheezing., Disp: 18 g, Rfl: 2    aspirin 81 MG EC tablet, Take 1 tablet by mouth Daily., Disp: 30 tablet, Rfl: 3    fluticasone (FLONASE) 50 MCG/ACT nasal spray, Administer 2 sprays into the nostril(s) as directed by provider Daily., Disp: 16 g, Rfl: 5    hydrALAZINE (APRESOLINE) 25 MG tablet, Take 1 tablet by mouth twice daily, Disp: 180 tablet, Rfl: 3    losartan (COZAAR) 50 MG tablet, Take 1 tablet by mouth twice daily, Disp: 180 tablet, Rfl: 3    pantoprazole (PROTONIX) 40 MG EC tablet, Take 1 tablet by mouth 2 (Two) Times a Day., Disp: 180 tablet, Rfl: 3    terazosin (HYTRIN) 5 MG capsule, Take 1 capsule by mouth Every Night., Disp: 90 capsule, Rfl: 3    Family History:  Family History   Problem Relation Age of Onset    Heart attack Mother     Heart failure Mother     Cancer Father        Past Medical History:  Past Medical History:   Diagnosis Date    Anemia     Aortic valve replaced     GERD (gastroesophageal reflux disease)     History of transfusion     Hyperlipidemia     Hypertension     Lung nodules     left     Pneumonia of both lower lobes due to infectious  organism 2023    Shortness of breath 2021    Stroke        Past Surgical History:  Past Surgical History:   Procedure Laterality Date    AORTIC VALVE REPAIR/REPLACEMENT N/A 2021    Procedure: AORTIC VALVE REPAIR/REPLACEMENT;  Surgeon: Nael Padilla MD;  Location: [facility] CVOR;  Service: Cardiothoracic;  Laterality: N/A;    CARDIAC CATHETERIZATION N/A 2021    Procedure: Left Heart Cath;  Surgeon: Darian Fajardo MD;  Location: [facility] CATH INVASIVE LOCATION;  Service: Cardiovascular;  Laterality: N/A;    CARDIAC CATHETERIZATION N/A 2021    Procedure: Right Heart Cath;  Surgeon: Darian Fajardo MD;  Location: [facility] CATH INVASIVE LOCATION;  Service: Cardiovascular;  Laterality: N/A;    COLONOSCOPY      COLONOSCOPY N/A 2021    Procedure: COLONOSCOPY with polypectomy;  Surgeon: CIRA Pope MD;  Location: [facility] ENDOSCOPY;  Service: Gastroenterology;  Laterality: N/A;  post op: diverticulosis, polyp    ENDOSCOPY N/A 2021    Procedure: ESOPHAGOGASTRODUODENOSCOPY;  Surgeon: CIRA Pope MD;  Location: [facility] ENDOSCOPY;  Service: Gastroenterology;  Laterality: N/A;  HH, gastric polyps    HERNIA REPAIR         Social History:  Social History     Socioeconomic History    Marital status:    Tobacco Use    Smoking status: Former     Current packs/day: 0.00     Average packs/day: 1.5 packs/day for 54.4 years (81.6 ttl pk-yrs)     Types: Cigarettes     Start date: 1957     Quit date: 2011     Years since quittin.9     Passive exposure: Past    Smokeless tobacco: Never   Vaping Use    Vaping status: Never Used   Substance and Sexual Activity    Alcohol use: Not Currently     Comment: quit in     Drug use: No    Sexual activity: Not Currently     Partners: Female       Review of Systems:  The following systems were reviewed as they relate to the cardiovascular system: Constitutional, Eyes, ENT, Cardiovascular, Respiratory, Gastrointestinal, Integumentary,  Neurological, Psychiatric, Hematologic, Endocrine, Musculoskeletal, and Genitourinary. The pertinent cardiovascular findings are reported above with all other cardiovascular points within those systems being negative.    Diagnostic Study Review:     Current Electrocardiogram:    ECG 12 Lead    Date/Time: 4/28/2025 11:57 AM  Performed by: Herbert Sahu MD    Authorized by: Herbert Sahu MD  Comparison: compared with previous ECG   Similar to previous ECG  Rhythm: sinus rhythm  Conduction: right bundle branch block  T Waves: T waves normal  Other: no other findings  Other findings: non-specific ST-T wave changes    Clinical impression: abnormal EKG          Laboratory Data:  Lab Results   Component Value Date    GLUCOSE 105 (H) 11/18/2024    BUN 6 (L) 11/18/2024    CREATININE 1.09 11/18/2024    EGFRIFNONA 77 08/12/2021    BCR 6 (L) 11/18/2024    K 4.7 11/18/2024    CO2 22 11/18/2024    CALCIUM 10.1 11/18/2024    ALBUMIN 4.3 11/18/2024    AST 17 11/18/2024    ALT 7 11/18/2024     Lab Results   Component Value Date    GLUCOSE 105 (H) 11/18/2024    CALCIUM 10.1 11/18/2024     11/18/2024    K 4.7 11/18/2024    CO2 22 11/18/2024     11/18/2024    BUN 6 (L) 11/18/2024    CREATININE 1.09 11/18/2024    EGFRIFNONA 77 08/12/2021    BCR 6 (L) 11/18/2024    ANIONGAP 13.0 08/12/2021     Lab Results   Component Value Date    WBC 6.9 11/18/2024    HGB 12.1 (L) 11/18/2024    HCT 37.4 (L) 11/18/2024    MCV 93 11/18/2024     11/18/2024     Lab Results   Component Value Date    CHOL 137 08/03/2021    CHLPL 138 11/18/2024    TRIG 248 (H) 11/18/2024    HDL 38 (L) 11/18/2024    LDL 60 11/18/2024     Lab Results   Component Value Date    HGBA1C 5.5 08/03/2021     Lab Results   Component Value Date    INR 1.04 08/06/2021    INR 1.19 (H) 08/05/2021    INR 0.99 08/03/2021    PROTIME 11.5 08/06/2021    PROTIME 13.0 (H) 08/05/2021    PROTIME 11.0 08/03/2021       Most Recent  Echo:  Results for orders placed during the hospital encounter of 04/27/21    Adult Transesophageal Echo (RAFAT) W/ Cont if Necessary Per Protocol    Interpretation Summary  · Left ventricular systolic function is normal.  · Left ventricular ejection fraction is 60 to 65%  · Left ventricular wall thickness is consistent with mild concentric hypertrophy.  · Left ventricular diastolic function was normal.  · Moderate aortic valve regurgitation is present.  · Severe aortic valve stenosis is present. Aortic valve area is 0.54 cm2.  · Peak velocity of the flow distal to the aortic valve is 483.5 cm/s. Aortic valve maximum pressure gradient is 93.5 mmHg. Aortic valve mean pressure gradient is 47.4 mmHg.  · Saline test results are negative.       Most Recent Stress Test:       Most Recent Cardiac Catheterization:   Results for orders placed during the hospital encounter of 04/27/21    Cardiac Catheterization/Vascular Study    Narrative  CARDIAC CATHETERIZATION REPORT      DATE OF PROCEDURE:  4/27/2021    INDICATION FOR PROCEDURE:    Severe aortic stenosis  Shortness of breath    PROCEDURE PERFORMED:    Left heart catheterization  Right heart catheterization  coronary angiography  left ventriculography    PROCEDURE COMMENTS:    After informed consent was obtained, the patient was prepped and draped in the usual sterile manner.  Mild to moderate sedation was administered.  Right femoral artery was accessed without difficulty and 6 Barbadian arterial sheath was inserted.  Sheath was flushed with heparinized saline.    Using 6 Barbadian Lupillo catheters, first left coronary artery and the right coronary was electively engaged and appropriate views were taken.  A 6 Barbadian JR4 catheter was used to cross aortic valve and left heart catheterization was performed.  Left ventriculography was done in CHEN view    Patient tolerated the procedure well.    FINDINGS:    1. HEMODYNAMICS:    Aortic pressure: 115/68    LVEDP: 15 mmHg    Gradient  "across aortic valve on pullback: 38.1 mmHg mean    Aortic valve area of 0.8 cm² was noted    Right atrial pressure: 5mmHg    Right ventricle pressure: 41/5 mmHg    Pulmonary artery pressure: 41/18/20 7 mmHg    Cardiac output: 3.9 L/min    Aortic valve area: 0.8 cm² with mean gradient of 38.1 mmHg      2. LEFT VENTRICULOGRAPHY: 60%      3. CORONARY ANGIOGRAPHY:    A: Left main coronary artery: Short and normal    B: Left anterior descending artery: 25 to 30% plaque in the midsegment.  No high-grade stenosis    C: Left circumflex coronary artery: LCx is dominant vessel and has 25% plaque in the mid segment and PDA has 10 to 20% plaque in the proximal segment    D: Right coronary artery: Nondominant    SUMMARY:    Minimal coronary artery disease  Severe aortic stenosis with aortic valve area of 0.8 cm² and mean gradient of 38.1 mm²    RECOMMENDATIONS:    Surgical consult with cardiothoracic surgery.       NOTE: The following portions of the patient's note were reviewed, confirmed and/or updated this visit as appropriate: History of present illness/Interval history, physical examination, assessment & plan, allergies, current medications, past family history, past medical history, past social history, past surgical history and problem list.    Labs pertinent to today's visit on 04/28/2025 (including but not limited to CBC, CMP, and lipid profiles) were requested from the patient's primary care provider/hospital/clinical laboratory.  If the labs were available for the visit, they were reviewed with the patient.  If they were not available, when received, special interest will be made to the labs pertinent to this visit.  The patient's most recent \"in-house\" labs are noted below and have been reviewed.  Outside labs pertinent to this visit are scanned into the record and have been reviewed.    Discussions held today, 04/28/2025,regarding procedures included risk, benefits, and options including but not limited to: Death, " MI, stroke, pain, bleeding, infection, and possible need for vascular/thoracic/cardiothoracic surgery.    Copied information within this note was reviewed and is current as of 04/28/2025.    Assessment and plan noted herein represents the current plan of care as of 04/28/2025.    Significant resources from our office and staff are inherent in engaging this patient in a continuous and active collaborative plan of care related to their chronic cardiovascular conditions outlined herein.  The management of these conditions requires the direction of our service with specialized clinical knowledge, skills, and experience.  This collaborative care includes but is not limited to patient education, expectations and responsibilities, shared decision making around therapeutic goals, and shared commitments to achieve those goals.

## 2025-05-04 DIAGNOSIS — N40.1 BPH WITH OBSTRUCTION/LOWER URINARY TRACT SYMPTOMS: ICD-10-CM

## 2025-05-04 DIAGNOSIS — N13.8 BPH WITH OBSTRUCTION/LOWER URINARY TRACT SYMPTOMS: ICD-10-CM

## 2025-05-05 RX ORDER — TERAZOSIN 5 MG/1
5 CAPSULE ORAL NIGHTLY
Qty: 90 CAPSULE | Refills: 3 | Status: SHIPPED | OUTPATIENT
Start: 2025-05-05

## 2025-05-08 ENCOUNTER — TELEPHONE (OUTPATIENT)
Dept: CARDIOLOGY | Facility: CLINIC | Age: 80
End: 2025-05-08

## 2025-05-08 NOTE — TELEPHONE ENCOUNTER
Caller: Chong Manuel    Relationship to patient: Self    Best call back number: [phone]    Patient is needing: PT SAYS SINCE DR. ONTIVEROS INCREASED HIS HYDRALAZINE TO 3X A DAY HE HAS NOT BEEN FEELING WELL. SAYS HE CAN'T SLEEP AND WHEN HE DOES HE HAS BAD DREAMS. HE DOES NOT THINK HE NEEDS TO TAKE IT 3X A DAY. PLEASE CALL TO ADVISE.

## 2025-05-08 NOTE — TELEPHONE ENCOUNTER
Called and spoke with the patient. Informing the patient of the message commented below from Dr. Tuttle. Patient acknowledged this information.

## 2025-05-23 ENCOUNTER — TELEPHONE (OUTPATIENT)
Dept: FAMILY MEDICINE CLINIC | Facility: CLINIC | Age: 80
End: 2025-05-23
Payer: MEDICARE

## 2025-05-23 DIAGNOSIS — I10 ESSENTIAL HYPERTENSION: Primary | ICD-10-CM

## 2025-05-23 NOTE — TELEPHONE ENCOUNTER
Good Morning!! Chong came in on Friday and had some labs drawn, and he mentioned that his cardiologist wanted a thyroid panel done, Juany said that she could add to what she did on Friday if you wanted to add the thyroid,, Thank you

## 2025-05-24 LAB
ALBUMIN SERPL-MCNC: 4.3 G/DL (ref 3.8–4.8)
ALP SERPL-CCNC: 51 IU/L (ref 44–121)
ALT SERPL-CCNC: 6 IU/L (ref 0–44)
AST SERPL-CCNC: 17 IU/L (ref 0–40)
BASOPHILS # BLD AUTO: 0.1 X10E3/UL (ref 0–0.2)
BASOPHILS NFR BLD AUTO: 1 %
BILIRUB SERPL-MCNC: 0.3 MG/DL (ref 0–1.2)
BUN SERPL-MCNC: 8 MG/DL (ref 8–27)
BUN/CREAT SERPL: 7 (ref 10–24)
CALCIUM SERPL-MCNC: 9.9 MG/DL (ref 8.6–10.2)
CHLORIDE SERPL-SCNC: 100 MMOL/L (ref 96–106)
CHOLEST SERPL-MCNC: 203 MG/DL (ref 100–199)
CO2 SERPL-SCNC: 21 MMOL/L (ref 20–29)
CREAT SERPL-MCNC: 1.07 MG/DL (ref 0.76–1.27)
EGFRCR SERPLBLD CKD-EPI 2021: 70 ML/MIN/1.73
EOSINOPHIL # BLD AUTO: 0.2 X10E3/UL (ref 0–0.4)
EOSINOPHIL NFR BLD AUTO: 3 %
ERYTHROCYTE [DISTWIDTH] IN BLOOD BY AUTOMATED COUNT: 12.5 % (ref 11.6–15.4)
FERRITIN SERPL-MCNC: 26 NG/ML (ref 30–400)
GLOBULIN SER CALC-MCNC: 2.3 G/DL (ref 1.5–4.5)
GLUCOSE SERPL-MCNC: 105 MG/DL (ref 70–99)
HBA1C MFR BLD: 6.2 % (ref 4.8–5.6)
HCT VFR BLD AUTO: 37.6 % (ref 37.5–51)
HDLC SERPL-MCNC: 38 MG/DL
HGB BLD-MCNC: 12.3 G/DL (ref 13–17.7)
IMM GRANULOCYTES # BLD AUTO: 0 X10E3/UL (ref 0–0.1)
IMM GRANULOCYTES NFR BLD AUTO: 0 %
IRON SATN MFR SERPL: 23 % (ref 15–55)
IRON SERPL-MCNC: 70 UG/DL (ref 38–169)
LDLC SERPL CALC-MCNC: 123 MG/DL (ref 0–99)
LYMPHOCYTES # BLD AUTO: 1.8 X10E3/UL (ref 0.7–3.1)
LYMPHOCYTES NFR BLD AUTO: 25 %
MCH RBC QN AUTO: 30 PG (ref 26.6–33)
MCHC RBC AUTO-ENTMCNC: 32.7 G/DL (ref 31.5–35.7)
MCV RBC AUTO: 92 FL (ref 79–97)
MONOCYTES # BLD AUTO: 0.6 X10E3/UL (ref 0.1–0.9)
MONOCYTES NFR BLD AUTO: 8 %
NEUTROPHILS # BLD AUTO: 4.3 X10E3/UL (ref 1.4–7)
NEUTROPHILS NFR BLD AUTO: 63 %
PLATELET # BLD AUTO: 247 X10E3/UL (ref 150–450)
POTASSIUM SERPL-SCNC: 4.8 MMOL/L (ref 3.5–5.2)
PROT SERPL-MCNC: 6.6 G/DL (ref 6–8.5)
RBC # BLD AUTO: 4.1 X10E6/UL (ref 4.14–5.8)
SODIUM SERPL-SCNC: 134 MMOL/L (ref 134–144)
TIBC SERPL-MCNC: 310 UG/DL (ref 250–450)
TRIGL SERPL-MCNC: 235 MG/DL (ref 0–149)
UIBC SERPL-MCNC: 240 UG/DL (ref 111–343)
VLDLC SERPL CALC-MCNC: 42 MG/DL (ref 5–40)
WBC # BLD AUTO: 7 X10E3/UL (ref 3.4–10.8)

## 2025-05-24 NOTE — PROGRESS NOTES
Subjective   The ABCs of the Annual Wellness Visit  Medicare Wellness Visit      Chong Manuel is a 80 y.o. patient who presents for a Medicare Wellness Visit.    The following portions of the patient's history were reviewed and   updated as appropriate: allergies, current medications, past family history, past medical history, past social history, past surgical history, and problem list.    Compared to one year ago, the patient's physical   health is better.  Compared to one year ago, the patient's mental   health is better.    Recent Hospitalizations:  He was not admitted to the hospital during the last year.     Current Medical Providers:  Patient Care Team:  Aaliyah Hearn MD as PCP - General (Family Medicine)    Outpatient Medications Prior to Visit   Medication Sig Dispense Refill    acetaminophen (TYLENOL) 325 MG tablet Take 2 tablets by mouth Every 4 (Four) Hours As Needed for Mild Pain .      albuterol sulfate  (90 Base) MCG/ACT inhaler Inhale 2 puffs Every 4 (Four) Hours As Needed for Wheezing. 18 g 2    aspirin 81 MG EC tablet Take 1 tablet by mouth Daily. 30 tablet 3    hydrALAZINE (APRESOLINE) 25 MG tablet Take 1 tablet by mouth 3 times a day. (Patient taking differently: Take 1 tablet by mouth 2 (Two) Times a Day.) 180 tablet 3    losartan (COZAAR) 50 MG tablet Take 1 tablet by mouth twice daily 180 tablet 3    pantoprazole (PROTONIX) 40 MG EC tablet Take 1 tablet by mouth 2 (Two) Times a Day. 180 tablet 3    terazosin (HYTRIN) 5 MG capsule TAKE 1 CAPSULE BY MOUTH ONCE DAILY AT NIGHT 90 capsule 3    fluticasone (FLONASE) 50 MCG/ACT nasal spray Administer 2 sprays into the nostril(s) as directed by provider Daily. 16 g 5     No facility-administered medications prior to visit.     No opioid medication identified on active medication list. I have reviewed chart for other potential  high risk medication/s and harmful drug interactions in the elderly.      Aspirin is on active  "medication list. Aspirin use is indicated based on review of current medical condition/s. Pros and cons of this therapy have been discussed today. Benefits of this medication outweigh potential harm.  Patient has been encouraged to continue taking this medication.  .      Patient Active Problem List   Diagnosis    Acute on chronic blood loss anemia    Essential hypertension    Mixed hyperlipidemia    Chronic GERD    CVA (cerebral vascular accident)    Iron deficiency anemia    Pure hypercholesterolemia    Solitary pulmonary nodule    Shortness of breath    Chest discomfort    Aortic stenosis, severe    GI bleed    S/P AVR (tissue) by Dr. Padilla 8/5/2021    BPH with obstruction/lower urinary tract symptoms    Primary osteoarthritis involving multiple joints    Acute cough    SOB (shortness of breath)    White coat syndrome with diagnosis of hypertension    Upper respiratory tract infection     Advance Care Planning Advance Directive is not on file.  ACP discussion was held with the patient during this visit. Patient does not have an advance directive, information provided.            Objective   Vitals:    05/28/25 0903   BP: 164/79   BP Location: Right arm   Patient Position: Sitting   Cuff Size: Adult   Pulse: 78   Resp: 18   Temp: 97.5 °F (36.4 °C)   TempSrc: Infrared   SpO2: 94%   Weight: 76.3 kg (168 lb 3.2 oz)   Height: 165.1 cm (65\")       Estimated body mass index is 27.99 kg/m² as calculated from the following:    Height as of this encounter: 165.1 cm (65\").    Weight as of this encounter: 76.3 kg (168 lb 3.2 oz).    BMI is >= 25 and <30. (Overweight) The following options were offered after discussion;: exercise counseling/recommendations and nutrition counseling/recommendations           Does the patient have evidence of cognitive impairment? No  Lab Results   Component Value Date    CHLPL 203 (H) 05/23/2025    TRIG 235 (H) 05/23/2025    HDL 38 (L) 05/23/2025     (H) 05/23/2025    VLDL 42 (H) " 2025    HGBA1C 6.2 (H) 2025                                                                                                Health  Risk Assessment    Smoking Status:  Social History     Tobacco Use   Smoking Status Former    Current packs/day: 0.00    Average packs/day: 1.5 packs/day for 54.4 years (81.6 ttl pk-yrs)    Types: Cigarettes    Start date: 1957    Quit date: 2011    Years since quittin.0    Passive exposure: Past   Smokeless Tobacco Never     Alcohol Consumption:  Social History     Substance and Sexual Activity   Alcohol Use Not Currently    Comment: quit in        Fall Risk Screen  STEADI Fall Risk Assessment was completed, and patient is at LOW risk for falls.Assessment completed on:2025    Depression Screening   Little interest or pleasure in doing things? Not at all   Feeling down, depressed, or hopeless? Not at all   PHQ-2 Total Score 0      Health Habits and Functional and Cognitive Screenin/28/2025     9:06 AM   Functional & Cognitive Status   Do you have difficulty preparing food and eating? No   Do you have difficulty bathing yourself, getting dressed or grooming yourself? No   Do you have difficulty using the toilet? No   Do you have difficulty moving around from place to place? No   Do you have trouble with steps or getting out of a bed or a chair? No   Current Diet Unhealthy Diet   Dental Exam Up to date   Eye Exam Up to date   Exercise (times per week) 0 times per week   Current Exercises Include No Regular Exercise   Do you need help using the phone?  No   Are you deaf or do you have serious difficulty hearing?  Yes   Do you need help to go to places out of walking distance? No   Do you need help shopping? No   Do you need help preparing meals?  No   Do you need help with housework?  No   Do you need help with laundry? No   Do you need help taking your medications? No   Do you need help managing money? No   Do you ever drive or ride in a car  without wearing a seat belt? No   Have you felt unusual stress, anger or loneliness in the last month? Yes   Who do you live with? Alone   If you need help, do you have trouble finding someone available to you? No   Have you been bothered in the last four weeks by sexual problems? No   Do you have difficulty concentrating, remembering or making decisions? No           Age-appropriate Screening Schedule:  Refer to the list below for future screening recommendations based on patient's age, sex and/or medical conditions. Orders for these recommended tests are listed in the plan section. The patient has been provided with a written plan.    Health Maintenance List  Health Maintenance   Topic Date Due    TDAP/TD VACCINES (2 - Tdap) 11/04/2020    ZOSTER VACCINE (2 of 2) 12/05/2023    COVID-19 Vaccine (5 - 2024-25 season) 09/01/2024    INFLUENZA VACCINE  07/01/2025    LIPID PANEL  05/23/2026    ANNUAL WELLNESS VISIT  05/28/2026    RSV Vaccine - Adults  Completed    Pneumococcal Vaccine 50+  Completed    LUNG CANCER SCREENING  Discontinued                                                                                                                                                CMS Preventative Services Quick Reference  Risk Factors Identified During Encounter  None Identified    The above risks/problems have been discussed with the patient.  Pertinent information has been shared with the patient in the After Visit Summary.  An After Visit Summary and PPPS were made available to the patient.    Follow Up:   Next Medicare Wellness visit to be scheduled in 1 year.         Additional E&M Note during same encounter follows:  Patient has additional, significant, and separately identifiable condition(s)/problem(s) that require work above and beyond the Medicare Wellness Visit     Chief Complaint  Medicare Wellness-subsequent, Hypertension, Hyperlipidemia, Heartburn, and Cerebrovascular Accident    Subjective    HPI  Chong is  also being seen today for additional medical problem/s per active problem list.    Lab work recently done to assist in monitoring chronic problems.  Reviewed with him as below.    Total cholesterol-all numbers a little worse than they were 6 months ago.  Total cholesterol up to 203 from 138.  .  Up from 60.  HDL 38.  Triglycerides 235.  Last time back in November he was still on Zocor 40 mg/day.  When I saw him in April he complained of leg pains and had reduced statin to 20 mg/day.  We did a trial discontinuation over the past month.  Clearly numbers have decompensated.  Leg pains have essentially resolved since discontinuation of his statin.    Iron deficiency anemia-hemoglobin was 12.3.  Stable.  Iron profile was normal.  Iron level was 70.  Ferritin was a little low at 26.  He is no longer on iron supplement.    CMP-grossly normal.  GFR was 70.  Glucose was 105 and A1c was 6.2%.                 The patient presents for evaluation of blood pressure, cholesterol management, nocturia, and health maintenance.    He has been monitoring his blood pressure at home, with readings typically ranging from the 110s to 120s. He notes that his blood pressure fluctuates significantly depending on his current activities or stress levels. He reports no hospital admissions within the past year. He underwent an EKG approximately one month ago under the care of Dr. Tuttle, who subsequently increased his blood pressure medication dosage. However, this adjustment resulted in sleep disturbances, leading him to discontinue the additional dose after two days. He is currently on a regimen of two blood pressure medications daily.    He has discontinued his cholesterol medication, simvastatin, and reports feeling better overall. His most recent cholesterol level was 203.    He was previously prescribed terazosin by Dr. Guerrero due to nocturia, but he continues to experience this symptom. His sleep schedule typically involves retiring at  "9:00 PM and waking up at 5:30 AM.    He does not have an advanced directive or a living will. He has already arranged his cemetery plot and .     PAST SURGICAL HISTORY:  He had a valve worked on in .          Objective   Vital Signs:  /79 (BP Location: Right arm, Patient Position: Sitting, Cuff Size: Adult)   Pulse 78   Temp 97.5 °F (36.4 °C) (Infrared)   Resp 18   Ht 165.1 cm (65\")   Wt 76.3 kg (168 lb 3.2 oz)   SpO2 94%   BMI 27.99 kg/m²   Physical Exam  Constitutional:       Appearance: He is well-developed.      Comments:      HENT:      Head: Normocephalic and atraumatic.   Eyes:      Conjunctiva/sclera: Conjunctivae normal.   Cardiovascular:      Rate and Rhythm: Normal rate.   Pulmonary:      Effort: Pulmonary effort is normal.   Musculoskeletal:         General: Normal range of motion.      Cervical back: Normal range of motion.   Skin:     General: Skin is warm and dry.      Findings: No rash.   Neurological:      Mental Status: He is alert and oriented to person, place, and time.   Psychiatric:         Behavior: Behavior normal.           Cardiovascular: Regular rate and rhythm, no murmurs, rubs, or gallops            Results  Labs   - TSH: 3.7   - A1c: 6.2   - Blood count: 12.3   - Iron levels: Normal   - Kidney function: Normal   - Liver tests: Normal   - Total cholesterol: 203   Diagnoses and all orders for this visit:    1. Physical exam, annual (Primary)    2. Iron deficiency anemia due to chronic blood loss    3. Chronic GERD    4. White coat syndrome with diagnosis of hypertension    5. Mixed hyperlipidemia    6. Essential hypertension    7. S/P AVR (tissue) by Dr. Padilla 2021    8. Aortic stenosis, severe  Overview:  Added automatically from request for surgery [id number]      9. Primary osteoarthritis involving multiple joints    10. Myalgia due to statin    11. BPH with obstruction/lower urinary tract symptoms              Assessment and Plan        Medicare wellness " review completed as above.  In addition to that, we reviewed the status of multiple chronic medical problems as below.    1. Blood pressure management.  - Blood pressure readings are consistently within the range of 110s to 120s, indicating effective control.  - The current regimen of terazosin and losartan appears to be managing blood pressure well.  - Discussion included the importance of maintaining the current medication regimen to avoid decompensation of blood pressure control.  - He will continue with the current medication regimen of terazosin and losartan.    2. Cholesterol management.  - Total cholesterol level is slightly elevated at 203 mg/dL.  - He has discontinued simvastatin and reports feeling better off the medication.  - It was discussed that discontinuing simvastatin is reasonable given his age and the lack of significant benefit in reducing heart attack or stroke risk beyond age 75.  - Advised to moderate intake of high-sugar and high-fat foods to help manage cholesterol levels.    3. Nocturia.  - Reports nocturia, likely due to his prostate gland, considered normal for a man to urinate up to two times per night.  - The current dose of terazosin will be maintained as it also aids in blood pressure control.  - Discussion included the ineffectiveness of increasing the dose of terazosin for nocturia beyond reducing urination to two times per night.  - He will continue with the current dose of terazosin.    4. Health maintenance.  - Thyroid function is within normal limits, with a TSH level of 3.7.  - A1c level is 6.2, indicating well-controlled blood glucose levels.  - Blood count has shown a slight increase compared to previous years, now at 12.3, which is within the normal range.  - Iron levels are within the normal range, suggesting adequate iron stores and no longer anemic.  - Metabolic panel results are unremarkable, with normal kidney function, liver tests, electrolyte levels, and protein  levels.  - Provided with information regarding advanced directives and encouraged to review it.    Follow-up  The patient will follow up in 6 months.         Follow Up   Return in about 6 months (around 11/28/2025).  Patient was given instructions and counseling regarding his condition or for health maintenance advice. Please see specific information pulled into the AVS if appropriate.  Patient or patient representative verbalized consent for the use of Ambient Listening during the visit with  Aaliyah Hearn MD for chart documentation. 5/28/2025  09:47 EDT

## 2025-05-24 NOTE — TELEPHONE ENCOUNTER
I have not been in the office since May 21.  I reviewed the chart.  I did not see anything documented about recommending thyroid studies.  I will certainly take Chong's word for it and order a TSH.  Order placed.

## 2025-05-27 LAB — SPECIMEN STATUS: NORMAL

## 2025-05-28 ENCOUNTER — OFFICE VISIT (OUTPATIENT)
Dept: FAMILY MEDICINE CLINIC | Facility: CLINIC | Age: 80
End: 2025-05-28
Payer: MEDICARE

## 2025-05-28 VITALS
BODY MASS INDEX: 28.02 KG/M2 | HEART RATE: 78 BPM | TEMPERATURE: 97.5 F | OXYGEN SATURATION: 94 % | WEIGHT: 168.2 LBS | HEIGHT: 65 IN | DIASTOLIC BLOOD PRESSURE: 79 MMHG | RESPIRATION RATE: 18 BRPM | SYSTOLIC BLOOD PRESSURE: 164 MMHG

## 2025-05-28 DIAGNOSIS — T46.6X5A MYALGIA DUE TO STATIN: ICD-10-CM

## 2025-05-28 DIAGNOSIS — I10 WHITE COAT SYNDROME WITH DIAGNOSIS OF HYPERTENSION: ICD-10-CM

## 2025-05-28 DIAGNOSIS — M79.10 MYALGIA DUE TO STATIN: ICD-10-CM

## 2025-05-28 DIAGNOSIS — Z95.2 S/P AVR: ICD-10-CM

## 2025-05-28 DIAGNOSIS — K21.9 CHRONIC GERD: ICD-10-CM

## 2025-05-28 DIAGNOSIS — Z00.00 PHYSICAL EXAM, ANNUAL: Primary | ICD-10-CM

## 2025-05-28 DIAGNOSIS — M15.0 PRIMARY OSTEOARTHRITIS INVOLVING MULTIPLE JOINTS: ICD-10-CM

## 2025-05-28 DIAGNOSIS — I35.0 AORTIC STENOSIS, SEVERE: ICD-10-CM

## 2025-05-28 DIAGNOSIS — N13.8 BPH WITH OBSTRUCTION/LOWER URINARY TRACT SYMPTOMS: ICD-10-CM

## 2025-05-28 DIAGNOSIS — D50.0 IRON DEFICIENCY ANEMIA DUE TO CHRONIC BLOOD LOSS: ICD-10-CM

## 2025-05-28 DIAGNOSIS — E78.2 MIXED HYPERLIPIDEMIA: ICD-10-CM

## 2025-05-28 DIAGNOSIS — N40.1 BPH WITH OBSTRUCTION/LOWER URINARY TRACT SYMPTOMS: ICD-10-CM

## 2025-05-28 DIAGNOSIS — I10 ESSENTIAL HYPERTENSION: ICD-10-CM

## 2025-05-28 LAB — TSH SERPL DL<=0.005 MIU/L-ACNC: 3.71 UIU/ML (ref 0.45–4.5)

## (undated) DEVICE — TP UMB COTN 1/8X36 U12T

## (undated) DEVICE — CANN RETRGR STYLET RSCP 15F

## (undated) DEVICE — ST TOURNI COMPL A/ 7IN

## (undated) DEVICE — Device

## (undated) DEVICE — GW PTFE EMERALD HEPCOAT FC J TIP STD .035 3MM 150CM

## (undated) DEVICE — TOWEL,OR,DSP,ST,WHITE,DLX,4/PK,20PK/CS: Brand: MEDLINE

## (undated) DEVICE — ADAPT ANTEGRADE RETRGR

## (undated) DEVICE — SUT PROLN 5/0 V5 36IN 8934H

## (undated) DEVICE — CONNECT Y INTERSEPT W/LL 3/8 X 3/8 X 3/8IN

## (undated) DEVICE — SPONGE,DISSECTOR,ROUND CHERRY,XR,ST,5/PK: Brand: MEDLINE

## (undated) DEVICE — TEMP PACING WIRE: Brand: MYO/WIRE

## (undated) DEVICE — SUT PROLN 4/0 V5 36IN 8935H

## (undated) DEVICE — BG BLD SYS

## (undated) DEVICE — CONTAINER,SPECIMEN,OR STERILE,4OZ: Brand: MEDLINE

## (undated) DEVICE — SUT PROLN 4/0 V7 36IN 8975H

## (undated) DEVICE — 28 FR STRAIGHT – SOFT PVC CATHETER: Brand: PVC THORACIC CATHETERS

## (undated) DEVICE — PRESSURE TUBING: Brand: TRUWAVE

## (undated) DEVICE — CABL BIPOL W/ALLGTR CLIP/SM 12FT

## (undated) DEVICE — SUT SILK 2/0 TIES 18IN A185H

## (undated) DEVICE — DRAPE SHEET ULTRAGARD: Brand: MEDLINE

## (undated) DEVICE — SNAR POLYP HOTSNARE/BRAIDED OVL/MINI 7F 2.8X10MM 230CM 1P/U

## (undated) DEVICE — SUCTION CANISTER, 1500CC,SAFELINER: Brand: DEROYAL

## (undated) DEVICE — DRN WND CH RND FUL/FLUT NO/TROC 3/8IN 28F

## (undated) DEVICE — GAUZE,SPONGE,4"X4",32PLY,XRAY,STRL,LF: Brand: MEDLINE

## (undated) DEVICE — PK PERFUS CUST W/CARDIOPLEGIA

## (undated) DEVICE — SUT SILK 0 CT1 CR8 18IN C021D

## (undated) DEVICE — CATH DIAG IMPULSE PIG .056 6F 110CM

## (undated) DEVICE — PK TRY HEART CATH 50

## (undated) DEVICE — HEMOCONCENTRATOR PERFUS LPS06

## (undated) DEVICE — SI AVANTI+ 6F MID W/O GW&OBT: Brand: AVANTI

## (undated) DEVICE — CATH THERMODIL SWANGANZ 4LUM 6F 110CM

## (undated) DEVICE — SWAN-GANZ TRUE SIZE THERMODILUTION "S" TIP: Brand: SWAN-GANZ TRUE SIZE

## (undated) DEVICE — GW XCHG AMPLTZ XSTIF PTFE CRV .035IN 3X180CM

## (undated) DEVICE — PAPR PRNT PK SONY W RIBN UPC55

## (undated) DEVICE — SOL IRR NACL 0.9PCT BT 1000ML

## (undated) DEVICE — BITEBLOCK ENDO W/STRAP 60F A/ LF DISP

## (undated) DEVICE — BOOT SUT XRAY DETECT STD YEL/BLU CA/50

## (undated) DEVICE — SOL NACL 0.9PCT 1000ML

## (undated) DEVICE — ST PERFUS M/

## (undated) DEVICE — CATH DIAG IMPULSE FR4 6F 100CM

## (undated) DEVICE — SENSR CERBRL O2 PK/2

## (undated) DEVICE — SUT SILK 2 SUTUPAK TIE 60IN SA8H 2STRAND

## (undated) DEVICE — IRRIGATOR BULB ASEPTO 60CC STRL

## (undated) DEVICE — PINNACLE INTRODUCER SHEATH: Brand: PINNACLE

## (undated) DEVICE — SUT SILK 2/0 SH CR8 18IN CR8 C012D

## (undated) DEVICE — SUT PROLN 3/0 V7 D/A 36IN 8976H

## (undated) DEVICE — ELECTRD BLD EZ CLN STD 6.5IN

## (undated) DEVICE — CANN VESL CORNRY STR W/6MM BALN

## (undated) DEVICE — SUT PROLN 5/0 RB2 D/A 30IN 8710H

## (undated) DEVICE — SOL IRR H2O BTL 1000ML STRL

## (undated) DEVICE — BLOOD TRANSFUSION FILTER: Brand: HAEMONETICS

## (undated) DEVICE — GLV SURG BIOGEL LTX PF 7 1/2

## (undated) DEVICE — CATH DIAG IMPULSE FL4 6F 100CM

## (undated) DEVICE — CANN ART EOPA 3D NV W/CONN 20F

## (undated) DEVICE — PK ATS CUST W CARDIOTOMY RESEVOIR

## (undated) DEVICE — ELECTRD DEFIB M/FUNC PROPADZ STRL 2PK

## (undated) DEVICE — SYS PERFUS SEP PLATLT W TIPS CUST

## (undated) DEVICE — SUT PROLN 2/0 V5 48IN D9694

## (undated) DEVICE — PK ENDO GI 50

## (undated) DEVICE — TRAP WIDEEYE POLYP

## (undated) DEVICE — ANTIBACTERIAL UNDYED BRAIDED (POLYGLACTIN 910), SYNTHETIC ABSORBABLE SUTURE: Brand: COATED VICRYL

## (undated) DEVICE — COUNT NDL MAG XLG

## (undated) DEVICE — GW WHOLEY HITORQ MOD/J .035 145CM

## (undated) DEVICE — SUT SILK 4/0 TIES 18IN A183H

## (undated) DEVICE — TUBING, SUCTION, 1/4" X 12', STRAIGHT: Brand: MEDLINE

## (undated) DEVICE — PK OPN HEART WHT WRP 50

## (undated) DEVICE — CANN AORT ROOT DLP VNT/8IN 14G 7F

## (undated) DEVICE — SUT PDS 0 CT-1 Z340H